# Patient Record
Sex: FEMALE | Race: WHITE | NOT HISPANIC OR LATINO
[De-identification: names, ages, dates, MRNs, and addresses within clinical notes are randomized per-mention and may not be internally consistent; named-entity substitution may affect disease eponyms.]

---

## 2017-08-03 ENCOUNTER — APPOINTMENT (OUTPATIENT)
Dept: INTERNAL MEDICINE | Facility: CLINIC | Age: 62
End: 2017-08-03
Payer: COMMERCIAL

## 2017-08-03 VITALS
WEIGHT: 159 LBS | SYSTOLIC BLOOD PRESSURE: 136 MMHG | HEART RATE: 78 BPM | OXYGEN SATURATION: 100 % | RESPIRATION RATE: 16 BRPM | TEMPERATURE: 98.7 F | BODY MASS INDEX: 23.55 KG/M2 | DIASTOLIC BLOOD PRESSURE: 80 MMHG | HEIGHT: 69 IN

## 2017-08-03 DIAGNOSIS — Z92.29 PERSONAL HISTORY OF OTHER DRUG THERAPY: ICD-10-CM

## 2017-08-03 DIAGNOSIS — Z12.11 ENCOUNTER FOR SCREENING FOR MALIGNANT NEOPLASM OF COLON: ICD-10-CM

## 2017-08-03 DIAGNOSIS — Z92.89 PERSONAL HISTORY OF OTHER MEDICAL TREATMENT: ICD-10-CM

## 2017-08-03 PROCEDURE — 99214 OFFICE O/P EST MOD 30 MIN: CPT

## 2017-08-04 ENCOUNTER — APPOINTMENT (OUTPATIENT)
Dept: ORTHOPEDIC SURGERY | Facility: CLINIC | Age: 62
End: 2017-08-04

## 2017-08-09 LAB
25(OH)D3 SERPL-MCNC: 26.2 NG/ML
ALBUMIN SERPL ELPH-MCNC: 4.7 G/DL
ALP BLD-CCNC: 131 U/L
ALT SERPL-CCNC: 106 U/L
ANION GAP SERPL CALC-SCNC: 16 MMOL/L
AST SERPL-CCNC: 66 U/L
BILIRUB SERPL-MCNC: 0.3 MG/DL
BUN SERPL-MCNC: 14 MG/DL
CALCIUM SERPL-MCNC: 9.9 MG/DL
CHLORIDE SERPL-SCNC: 103 MMOL/L
CHOLEST SERPL-MCNC: 305 MG/DL
CHOLEST/HDLC SERPL: 3.9 RATIO
CO2 SERPL-SCNC: 24 MMOL/L
CREAT SERPL-MCNC: 0.56 MG/DL
FOLATE SERPL-MCNC: 13.1 NG/ML
GLUCOSE SERPL-MCNC: 97 MG/DL
HBA1C MFR BLD HPLC: 5.6 %
HDLC SERPL-MCNC: 79 MG/DL
LDLC SERPL CALC-MCNC: 199 MG/DL
POTASSIUM SERPL-SCNC: 4.8 MMOL/L
PROT SERPL-MCNC: 7.1 G/DL
SODIUM SERPL-SCNC: 143 MMOL/L
TRIGL SERPL-MCNC: 134 MG/DL
VIT B12 SERPL-MCNC: 421 PG/ML

## 2017-10-03 ENCOUNTER — APPOINTMENT (OUTPATIENT)
Dept: CARDIOLOGY | Facility: CLINIC | Age: 62
End: 2017-10-03

## 2017-10-05 ENCOUNTER — APPOINTMENT (OUTPATIENT)
Dept: INTERNAL MEDICINE | Facility: CLINIC | Age: 62
End: 2017-10-05

## 2017-12-23 ENCOUNTER — TRANSCRIPTION ENCOUNTER (OUTPATIENT)
Age: 62
End: 2017-12-23

## 2018-02-20 ENCOUNTER — RX RENEWAL (OUTPATIENT)
Age: 63
End: 2018-02-20

## 2018-04-12 ENCOUNTER — MEDICATION RENEWAL (OUTPATIENT)
Age: 63
End: 2018-04-12

## 2018-04-20 ENCOUNTER — APPOINTMENT (OUTPATIENT)
Dept: ORTHOPEDIC SURGERY | Facility: CLINIC | Age: 63
End: 2018-04-20
Payer: COMMERCIAL

## 2018-04-20 VITALS
SYSTOLIC BLOOD PRESSURE: 158 MMHG | HEART RATE: 75 BPM | HEIGHT: 69 IN | WEIGHT: 159 LBS | BODY MASS INDEX: 23.55 KG/M2 | DIASTOLIC BLOOD PRESSURE: 94 MMHG

## 2018-04-20 PROCEDURE — 99214 OFFICE O/P EST MOD 30 MIN: CPT

## 2018-04-20 PROCEDURE — 73502 X-RAY EXAM HIP UNI 2-3 VIEWS: CPT | Mod: RT

## 2018-04-20 PROCEDURE — 72100 X-RAY EXAM L-S SPINE 2/3 VWS: CPT

## 2018-05-23 ENCOUNTER — APPOINTMENT (OUTPATIENT)
Dept: ORTHOPEDIC SURGERY | Facility: CLINIC | Age: 63
End: 2018-05-23
Payer: COMMERCIAL

## 2018-05-23 PROCEDURE — 99214 OFFICE O/P EST MOD 30 MIN: CPT

## 2018-06-05 ENCOUNTER — APPOINTMENT (OUTPATIENT)
Dept: INTERNAL MEDICINE | Facility: CLINIC | Age: 63
End: 2018-06-05
Payer: COMMERCIAL

## 2018-06-05 ENCOUNTER — NON-APPOINTMENT (OUTPATIENT)
Age: 63
End: 2018-06-05

## 2018-06-05 VITALS
SYSTOLIC BLOOD PRESSURE: 159 MMHG | BODY MASS INDEX: 25.18 KG/M2 | WEIGHT: 170 LBS | DIASTOLIC BLOOD PRESSURE: 80 MMHG | TEMPERATURE: 98.3 F | OXYGEN SATURATION: 99 % | HEIGHT: 69 IN | RESPIRATION RATE: 16 BRPM | HEART RATE: 85 BPM

## 2018-06-05 PROCEDURE — 93000 ELECTROCARDIOGRAM COMPLETE: CPT

## 2018-06-05 PROCEDURE — 99243 OFF/OP CNSLTJ NEW/EST LOW 30: CPT

## 2018-06-05 RX ORDER — METHYLPREDNISOLONE 4 MG/1
4 TABLET ORAL
Qty: 21 | Refills: 0 | Status: DISCONTINUED | COMMUNITY
Start: 2017-08-04 | End: 2018-06-05

## 2018-06-06 LAB
25(OH)D3 SERPL-MCNC: 18.5 NG/ML
ALBUMIN SERPL ELPH-MCNC: 4.6 G/DL
ALP BLD-CCNC: 186 U/L
ALT SERPL-CCNC: 96 U/L
ANION GAP SERPL CALC-SCNC: 18 MMOL/L
APPEARANCE: CLEAR
APTT BLD: 33 SEC
AST SERPL-CCNC: 48 U/L
BASOPHILS # BLD AUTO: 0.02 K/UL
BASOPHILS NFR BLD AUTO: 0.3 %
BILIRUB SERPL-MCNC: 0.4 MG/DL
BILIRUBIN URINE: NEGATIVE
BLOOD URINE: NEGATIVE
BUN SERPL-MCNC: 13 MG/DL
CALCIUM SERPL-MCNC: 9.9 MG/DL
CHLORIDE SERPL-SCNC: 102 MMOL/L
CHOLEST SERPL-MCNC: 253 MG/DL
CHOLEST/HDLC SERPL: 3.2 RATIO
CO2 SERPL-SCNC: 19 MMOL/L
COLOR: YELLOW
CREAT SERPL-MCNC: 0.62 MG/DL
EOSINOPHIL # BLD AUTO: 0.2 K/UL
EOSINOPHIL NFR BLD AUTO: 2.6 %
FOLATE SERPL-MCNC: 14.3 NG/ML
GLUCOSE QUALITATIVE U: NEGATIVE MG/DL
GLUCOSE SERPL-MCNC: 100 MG/DL
HBA1C MFR BLD HPLC: 5.8 %
HCT VFR BLD CALC: 43.8 %
HDLC SERPL-MCNC: 78 MG/DL
HGB BLD-MCNC: 14.8 G/DL
IMM GRANULOCYTES NFR BLD AUTO: 0.5 %
INR PPP: 0.89 RATIO
KETONES URINE: NEGATIVE
LDLC SERPL CALC-MCNC: 137 MG/DL
LEUKOCYTE ESTERASE URINE: NEGATIVE
LYMPHOCYTES # BLD AUTO: 1.68 K/UL
LYMPHOCYTES NFR BLD AUTO: 21.8 %
MAN DIFF?: NORMAL
MCHC RBC-ENTMCNC: 31.6 PG
MCHC RBC-ENTMCNC: 33.8 GM/DL
MCV RBC AUTO: 93.4 FL
MONOCYTES # BLD AUTO: 0.91 K/UL
MONOCYTES NFR BLD AUTO: 11.8 %
NEUTROPHILS # BLD AUTO: 4.85 K/UL
NEUTROPHILS NFR BLD AUTO: 63 %
NITRITE URINE: NEGATIVE
PH URINE: 6
PLATELET # BLD AUTO: 335 K/UL
POTASSIUM SERPL-SCNC: 4.5 MMOL/L
PROT SERPL-MCNC: 7.3 G/DL
PROTEIN URINE: NEGATIVE MG/DL
PT BLD: 10 SEC
RBC # BLD: 4.69 M/UL
RBC # FLD: 12.7 %
SODIUM SERPL-SCNC: 139 MMOL/L
SPECIFIC GRAVITY URINE: 1.01
TRIGL SERPL-MCNC: 189 MG/DL
TSH SERPL-ACNC: 3.03 UIU/ML
UROBILINOGEN URINE: NEGATIVE MG/DL
VIT B12 SERPL-MCNC: 458 PG/ML
WBC # FLD AUTO: 7.7 K/UL

## 2018-06-11 ENCOUNTER — TRANSCRIPTION ENCOUNTER (OUTPATIENT)
Age: 63
End: 2018-06-11

## 2018-06-12 ENCOUNTER — OUTPATIENT (OUTPATIENT)
Dept: OUTPATIENT SERVICES | Facility: HOSPITAL | Age: 63
LOS: 1 days | End: 2018-06-12
Payer: COMMERCIAL

## 2018-06-12 VITALS
RESPIRATION RATE: 15 BRPM | HEART RATE: 72 BPM | OXYGEN SATURATION: 100 % | DIASTOLIC BLOOD PRESSURE: 81 MMHG | SYSTOLIC BLOOD PRESSURE: 159 MMHG

## 2018-06-12 VITALS
TEMPERATURE: 98 F | SYSTOLIC BLOOD PRESSURE: 146 MMHG | HEIGHT: 69 IN | DIASTOLIC BLOOD PRESSURE: 80 MMHG | OXYGEN SATURATION: 100 % | HEART RATE: 67 BPM | WEIGHT: 169.32 LBS | RESPIRATION RATE: 12 BRPM

## 2018-06-12 DIAGNOSIS — Z96.642 PRESENCE OF LEFT ARTIFICIAL HIP JOINT: Chronic | ICD-10-CM

## 2018-06-12 DIAGNOSIS — M51.26 OTHER INTERVERTEBRAL DISC DISPLACEMENT, LUMBAR REGION: Chronic | ICD-10-CM

## 2018-06-12 DIAGNOSIS — H35.371 PUCKERING OF MACULA, RIGHT EYE: ICD-10-CM

## 2018-06-12 DIAGNOSIS — Z98.41 CATARACT EXTRACTION STATUS, RIGHT EYE: Chronic | ICD-10-CM

## 2018-06-12 DIAGNOSIS — D11.0 BENIGN NEOPLASM OF PAROTID GLAND: Chronic | ICD-10-CM

## 2018-06-12 DIAGNOSIS — R29.898 OTHER SYMPTOMS AND SIGNS INVOLVING THE MUSCULOSKELETAL SYSTEM: Chronic | ICD-10-CM

## 2018-06-12 PROCEDURE — 67042 VIT FOR MACULAR HOLE: CPT | Mod: RT

## 2018-06-12 NOTE — ASU PATIENT PROFILE, ADULT - PMH
Anxiety  hx anxiety during yrs of menstuation  Depression  hx depression during yrs of menstruation  Hyperlipidemia    Murmur    Osteoarthrosis    Rosacea

## 2018-06-12 NOTE — ASU PATIENT PROFILE, ADULT - PSH
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

## 2018-06-22 ENCOUNTER — APPOINTMENT (OUTPATIENT)
Dept: INTERNAL MEDICINE | Facility: CLINIC | Age: 63
End: 2018-06-22
Payer: COMMERCIAL

## 2018-06-22 VITALS
WEIGHT: 170 LBS | HEART RATE: 82 BPM | DIASTOLIC BLOOD PRESSURE: 82 MMHG | TEMPERATURE: 98.1 F | OXYGEN SATURATION: 98 % | SYSTOLIC BLOOD PRESSURE: 126 MMHG | BODY MASS INDEX: 25.18 KG/M2 | HEIGHT: 69 IN | RESPIRATION RATE: 17 BRPM

## 2018-06-22 DIAGNOSIS — Z01.818 ENCOUNTER FOR OTHER PREPROCEDURAL EXAMINATION: ICD-10-CM

## 2018-06-22 DIAGNOSIS — M54.16 RADICULOPATHY, LUMBAR REGION: ICD-10-CM

## 2018-06-22 PROCEDURE — 99396 PREV VISIT EST AGE 40-64: CPT

## 2018-07-16 ENCOUNTER — RX RENEWAL (OUTPATIENT)
Age: 63
End: 2018-07-16

## 2018-07-27 ENCOUNTER — APPOINTMENT (OUTPATIENT)
Dept: ORTHOPEDIC SURGERY | Facility: CLINIC | Age: 63
End: 2018-07-27

## 2018-10-26 ENCOUNTER — APPOINTMENT (OUTPATIENT)
Dept: ORTHOPEDIC SURGERY | Facility: CLINIC | Age: 63
End: 2018-10-26
Payer: COMMERCIAL

## 2018-10-26 ENCOUNTER — MEDICATION RENEWAL (OUTPATIENT)
Age: 63
End: 2018-10-26

## 2018-10-26 VITALS
DIASTOLIC BLOOD PRESSURE: 96 MMHG | HEIGHT: 69 IN | BODY MASS INDEX: 25.18 KG/M2 | HEART RATE: 86 BPM | SYSTOLIC BLOOD PRESSURE: 180 MMHG | WEIGHT: 170 LBS

## 2018-10-26 PROCEDURE — 73502 X-RAY EXAM HIP UNI 2-3 VIEWS: CPT | Mod: RT

## 2018-10-26 PROCEDURE — 99214 OFFICE O/P EST MOD 30 MIN: CPT

## 2018-10-26 PROCEDURE — 72100 X-RAY EXAM L-S SPINE 2/3 VWS: CPT

## 2018-10-29 ENCOUNTER — APPOINTMENT (OUTPATIENT)
Dept: INTERNAL MEDICINE | Facility: CLINIC | Age: 63
End: 2018-10-29
Payer: COMMERCIAL

## 2018-10-29 VITALS
HEIGHT: 69 IN | OXYGEN SATURATION: 97 % | WEIGHT: 293 LBS | TEMPERATURE: 98.1 F | RESPIRATION RATE: 17 BRPM | BODY MASS INDEX: 43.4 KG/M2 | HEART RATE: 94 BPM | SYSTOLIC BLOOD PRESSURE: 160 MMHG | DIASTOLIC BLOOD PRESSURE: 90 MMHG

## 2018-10-29 DIAGNOSIS — M54.16 RADICULOPATHY, LUMBAR REGION: ICD-10-CM

## 2018-10-29 DIAGNOSIS — Z63.4 DISAPPEARANCE AND DEATH OF FAMILY MEMBER: ICD-10-CM

## 2018-10-29 PROCEDURE — 99215 OFFICE O/P EST HI 40 MIN: CPT | Mod: 25

## 2018-10-29 PROCEDURE — 90686 IIV4 VACC NO PRSV 0.5 ML IM: CPT

## 2018-10-29 PROCEDURE — 93000 ELECTROCARDIOGRAM COMPLETE: CPT

## 2018-10-29 PROCEDURE — 90471 IMMUNIZATION ADMIN: CPT

## 2018-10-29 RX ORDER — DICLOFENAC SODIUM 75 MG/1
75 TABLET, DELAYED RELEASE ORAL
Qty: 60 | Refills: 1 | Status: DISCONTINUED | COMMUNITY
Start: 2018-04-20 | End: 2018-10-29

## 2018-10-29 RX ORDER — PREDNISOLONE ACETATE 10 MG/ML
1 SUSPENSION/ DROPS OPHTHALMIC
Qty: 5 | Refills: 0 | Status: DISCONTINUED | COMMUNITY
Start: 2018-06-13 | End: 2018-10-29

## 2018-10-29 RX ORDER — ATORVASTATIN CALCIUM 80 MG/1
80 TABLET, FILM COATED ORAL
Qty: 1 | Refills: 0 | Status: DISCONTINUED | COMMUNITY
Start: 2017-08-16 | End: 2018-10-29

## 2018-10-29 RX ORDER — CIPROFLOXACIN 3 MG/ML
0.3 SOLUTION OPHTHALMIC
Qty: 5 | Refills: 0 | Status: DISCONTINUED | COMMUNITY
Start: 2018-06-13 | End: 2018-10-29

## 2018-10-29 SDOH — SOCIAL STABILITY - SOCIAL INSECURITY: DISSAPEARANCE AND DEATH OF FAMILY MEMBER: Z63.4

## 2018-10-30 LAB
25(OH)D3 SERPL-MCNC: 17.4 NG/ML
ALBUMIN SERPL ELPH-MCNC: 4.9 G/DL
ALP BLD-CCNC: 163 U/L
ALT SERPL-CCNC: 104 U/L
ANION GAP SERPL CALC-SCNC: 14 MMOL/L
AST SERPL-CCNC: 63 U/L
BILIRUB SERPL-MCNC: 0.3 MG/DL
BUN SERPL-MCNC: 17 MG/DL
CALCIUM SERPL-MCNC: 10.2 MG/DL
CHLORIDE SERPL-SCNC: 103 MMOL/L
CHOLEST SERPL-MCNC: 275 MG/DL
CHOLEST/HDLC SERPL: 3.5 RATIO
CO2 SERPL-SCNC: 22 MMOL/L
CREAT SERPL-MCNC: 0.58 MG/DL
GLUCOSE SERPL-MCNC: 96 MG/DL
HBA1C MFR BLD HPLC: 5.8 %
HDLC SERPL-MCNC: 78 MG/DL
LDLC SERPL CALC-MCNC: 136 MG/DL
POTASSIUM SERPL-SCNC: 4.8 MMOL/L
PROT SERPL-MCNC: 7.5 G/DL
SODIUM SERPL-SCNC: 139 MMOL/L
TRIGL SERPL-MCNC: 306 MG/DL

## 2018-10-31 ENCOUNTER — MEDICATION RENEWAL (OUTPATIENT)
Age: 63
End: 2018-10-31

## 2018-11-02 ENCOUNTER — MEDICATION RENEWAL (OUTPATIENT)
Age: 63
End: 2018-11-02

## 2019-01-29 ENCOUNTER — RX RENEWAL (OUTPATIENT)
Age: 64
End: 2019-01-29

## 2019-06-14 ENCOUNTER — APPOINTMENT (OUTPATIENT)
Dept: ORTHOPEDIC SURGERY | Facility: CLINIC | Age: 64
End: 2019-06-14
Payer: MEDICAID

## 2019-06-14 VITALS
SYSTOLIC BLOOD PRESSURE: 149 MMHG | HEIGHT: 69 IN | DIASTOLIC BLOOD PRESSURE: 81 MMHG | WEIGHT: 164 LBS | BODY MASS INDEX: 24.29 KG/M2 | HEART RATE: 78 BPM

## 2019-06-14 PROCEDURE — 73502 X-RAY EXAM HIP UNI 2-3 VIEWS: CPT | Mod: RT

## 2019-06-14 PROCEDURE — 99214 OFFICE O/P EST MOD 30 MIN: CPT

## 2019-06-14 NOTE — HISTORY OF PRESENT ILLNESS
[Worsening] : worsening [9] : a maximum pain level of 9/10 [8] : a current pain level of 8/10 [Hip Movement] : worsened by hip movement [Standing] : standing [Constant] : ~He/She~ states the symptoms seem to be constant [Walking] : worsened by walking [Rest] : relieved by rest [de-identified] : Patient is a 62 year old female, status post left total hip in 2014, presenting with continued right hip and lower back pain. The left hip is doing great. The pain of the right hip is localized to the lateral aspect and sometimes the groin. She notes since she was seen last in October 2018, she has had increasingly severe pain of the right hip. She underwent intraarticular injection, with only relief for a couple weeks. The pain is worsened by going down the stairs.  She admits she has had some numbness to the whole right leg as well as tingling. She is being treated for her lower back under pain management, and is taking gabapentin. \par She is here to consider total hip replacement at this time on the right side, as her pain is now interfering with her quality of life, and she has failed all conservative management including NSAIDS (ie diclofenac, aleve, advil), intraarticular injection, and physical therapy.

## 2019-06-14 NOTE — CONSULT LETTER
[Dear  ___] : Dear  [unfilled], [Consult Letter:] : I had the pleasure of evaluating your patient, [unfilled]. [Please see my note below.] : Please see my note below. [Sincerely,] : Sincerely, [FreeTextEntry2] : GUADALUPE BENA\par  [FreeTextEntry3] : Jourdan Woods MD\par \par ________________________________________________\par [facility] : Hip/Knee Arthroplasty\par [address], [place] [address]\par (T) [phone]\par (F) [phone]

## 2019-06-14 NOTE — PHYSICAL EXAM
[de-identified] : On general examination the patient is adequately groomed and nourished. The vital parameters are as recorded. \par There is no lymphedema or diffuse swelling, no varicose veins, no skin warmth/erythema/scars/swelling, no ulcers and no palpable lymph nodes or masses in both lower extremities. Bilateral pedal pulses are well palpable.\par Upper Extremity:\par Both right and left upper extremities are unremarkable in terms of skin rash, lesions, pigmentation, redness, tenderness, swelling, joint instability, abnormal deformity or crepitus. The overall range of motion, sensation, motor tone and strength testing are normal.\par \par Hip Exam:\par The gait is right stiff hip coxalgic.\par The patient has equal leg lengths \par /Dg test is 8 inches on the right and 6 inches on the left. \par Active SLR is 40 degrees on the right and 60 degrees on the left. Right hip demonstrates no scars and the skin has no signs of inflammation or tenderness. Left hip reveals well healed scar from previous THR. \par Both Hips have a range of motion that is symmetrical in flexion and extension of:\par Hip flexion:             Right 80 degrees                Left 100 degrees\par Hip abduction:      Right 30 degrees                  Left 40 degrees\par Hip adduction:      Right 15 degrees                    Left 20 degrees\par Internal rotation:      Right 15 degrees                   Left 20 degrees\par External rotation:    Right 25 degrees                  Left 40 degrees\par There is severe pain with range of motion of the right hip. \par There is no flexion contracture, deformity or instability. \par Labral impingement tests are negative.\par Right hip flexor, abductor and extensor power is grade 4+.\par Left hip flexor, abductor and extensor power is grade 5.\par \par Spine Exam:\par There is mild curvature of the spine with loss of normal lumbar lordosis. The skin is devoid of erythema, scars, pigmentation or rashes. There is mild lumbar spasm and tenderness especially at L4-L5 or L5-S1. \par The range of motion of the lumbar spine is limited by pain and spasm. Forward flexion is 80% normal, extension catch positive, lateral flexion and rotation 80% normal. There is no lumbar spine imbalance or instability detected.\par Bilateral passive SLR is right 40 degrees, left 40 degrees in supine and sitting positions. Lasegue's Test is negative.\par Neurology:\par The patient is alert and oriented in person, place and time. The mood is calm and affect is normal.\par Testing for coordination including Rhomberg's Test and Finger-Nose Test, sensation, motor tone and power and deep tendon reflexes in both lower extremities is normal.\par  [de-identified] : The following radiographs were ordered and read by me during this patients visit. I reviewed each radiograph in detail with the patient and discussed the findings as highlighted below. \par AP and false profile views of the right hip and pelvis confirm advanced degenerative joint disease with joint space narrowing osteophyte and cyst formation. there is progression from the previous imaging. \par \par

## 2019-06-14 NOTE — DISCUSSION/SUMMARY
[de-identified] : Right hip DJD, s/p left total hip replacement, lumbar spine DJD. \par The natural history and treatment of degenerative arthritis was discussed with the patient at length today. The spectrum of treatment including nonoperative modalities to surgical intervention was elucidated. Noninvasive and nonoperative treatment modalities include weight reduction, activity modification with low impact exercise,  as needed use of acetaminophen or anti-inflammatory medications if tolerated, glucosamine/chondroitin supplements, and physical therapy. Further treatments can include corticosteroid injection and the use of viscosupplementation with hyaluronic acid injections. Definitive surgical treatment can certainly include total joint arthroplasty also. The risks and benefits of each treatment options was discussed and all questions were answered.\par In view of lack of adequate pain relief with conservative (non-surgical) management protocol including physical therapy, home exercises, weight loss, activity modification, NSAIDS; the patient is recommended to consider a right Total Hip Replacement. \par The risks, benefits, alternatives, implications, complications including but not limited to pain, stiffness, bleeding, limp, wound breakdown, infection, limb length discrepancy, dislocation, bone fracture, nerve and vascular compromise, implant wear and durability issues and rehabilitation were discussed and relevant questions were addressed. The possibility of recurrent pain, no improvement in pain and actual worsening of the pain were also mentioned in conversation with the patient. Medical complications related to the patient's general medical health including deep vein thrombosis, pulmonary embolus, heart attack, stroke, death and other complications from anesthesia were discussed as well. The patient wishes to proceed and will undergo preoperative medical evaluation and clearance, attend the preoperative educational class and will schedule surgery appropriately.\par Anticoagulation prophylaxis medication options to address risks of deep vein thrombosis and pulmonary embolism were discussed and weighed against the risks of bleeding and wound healing complications. The patient elected aspirin/coumadin prophylaxis with mechanical modalities.\par She has been indicated for outpatient total hip replacement.

## 2019-06-18 ENCOUNTER — APPOINTMENT (OUTPATIENT)
Dept: INTERNAL MEDICINE | Facility: CLINIC | Age: 64
End: 2019-06-18
Payer: MEDICAID

## 2019-06-18 VITALS
BODY MASS INDEX: 25.48 KG/M2 | OXYGEN SATURATION: 98 % | TEMPERATURE: 98.6 F | HEART RATE: 81 BPM | RESPIRATION RATE: 17 BRPM | WEIGHT: 172 LBS | DIASTOLIC BLOOD PRESSURE: 84 MMHG | SYSTOLIC BLOOD PRESSURE: 132 MMHG | HEIGHT: 69 IN

## 2019-06-18 DIAGNOSIS — Z86.79 PERSONAL HISTORY OF OTHER DISEASES OF THE CIRCULATORY SYSTEM: ICD-10-CM

## 2019-06-18 DIAGNOSIS — Z92.29 PERSONAL HISTORY OF OTHER DRUG THERAPY: ICD-10-CM

## 2019-06-18 DIAGNOSIS — H35.371 PUCKERING OF MACULA, RIGHT EYE: ICD-10-CM

## 2019-06-18 PROCEDURE — 99215 OFFICE O/P EST HI 40 MIN: CPT | Mod: 25

## 2019-06-18 PROCEDURE — 99406 BEHAV CHNG SMOKING 3-10 MIN: CPT

## 2019-06-18 RX ORDER — DICLOFENAC SODIUM 75 MG/1
75 TABLET, DELAYED RELEASE ORAL
Qty: 1 | Refills: 1 | Status: DISCONTINUED | COMMUNITY
Start: 2018-10-26 | End: 2019-06-18

## 2019-06-19 ENCOUNTER — OUTPATIENT (OUTPATIENT)
Dept: OUTPATIENT SERVICES | Facility: HOSPITAL | Age: 64
LOS: 1 days | End: 2019-06-19
Payer: MEDICAID

## 2019-06-19 VITALS
HEART RATE: 75 BPM | RESPIRATION RATE: 16 BRPM | TEMPERATURE: 98 F | SYSTOLIC BLOOD PRESSURE: 145 MMHG | WEIGHT: 164.02 LBS | OXYGEN SATURATION: 98 % | DIASTOLIC BLOOD PRESSURE: 80 MMHG | HEIGHT: 69 IN

## 2019-06-19 DIAGNOSIS — R29.898 OTHER SYMPTOMS AND SIGNS INVOLVING THE MUSCULOSKELETAL SYSTEM: Chronic | ICD-10-CM

## 2019-06-19 DIAGNOSIS — D11.0 BENIGN NEOPLASM OF PAROTID GLAND: Chronic | ICD-10-CM

## 2019-06-19 DIAGNOSIS — M16.12 UNILATERAL PRIMARY OSTEOARTHRITIS, LEFT HIP: ICD-10-CM

## 2019-06-19 DIAGNOSIS — M16.11 UNILATERAL PRIMARY OSTEOARTHRITIS, RIGHT HIP: ICD-10-CM

## 2019-06-19 DIAGNOSIS — M51.26 OTHER INTERVERTEBRAL DISC DISPLACEMENT, LUMBAR REGION: Chronic | ICD-10-CM

## 2019-06-19 DIAGNOSIS — Z98.41 CATARACT EXTRACTION STATUS, RIGHT EYE: Chronic | ICD-10-CM

## 2019-06-19 DIAGNOSIS — Z96.642 PRESENCE OF LEFT ARTIFICIAL HIP JOINT: Chronic | ICD-10-CM

## 2019-06-19 DIAGNOSIS — Z01.818 ENCOUNTER FOR OTHER PREPROCEDURAL EXAMINATION: ICD-10-CM

## 2019-06-19 LAB
ANION GAP SERPL CALC-SCNC: 18 MMOL/L — HIGH (ref 5–17)
BLD GP AB SCN SERPL QL: NEGATIVE — SIGNIFICANT CHANGE UP
BUN SERPL-MCNC: 11 MG/DL — SIGNIFICANT CHANGE UP (ref 7–23)
CALCIUM SERPL-MCNC: 10.4 MG/DL — SIGNIFICANT CHANGE UP (ref 8.4–10.5)
CHLORIDE SERPL-SCNC: 101 MMOL/L — SIGNIFICANT CHANGE UP (ref 96–108)
CO2 SERPL-SCNC: 21 MMOL/L — LOW (ref 22–31)
CREAT SERPL-MCNC: 0.52 MG/DL — SIGNIFICANT CHANGE UP (ref 0.5–1.3)
GLUCOSE SERPL-MCNC: 104 MG/DL — HIGH (ref 70–99)
POTASSIUM SERPL-MCNC: 3.7 MMOL/L — SIGNIFICANT CHANGE UP (ref 3.5–5.3)
POTASSIUM SERPL-SCNC: 3.7 MMOL/L — SIGNIFICANT CHANGE UP (ref 3.5–5.3)
RH IG SCN BLD-IMP: POSITIVE — SIGNIFICANT CHANGE UP
SODIUM SERPL-SCNC: 140 MMOL/L — SIGNIFICANT CHANGE UP (ref 135–145)

## 2019-06-19 PROCEDURE — G0463: CPT

## 2019-06-19 PROCEDURE — 85027 COMPLETE CBC AUTOMATED: CPT

## 2019-06-19 PROCEDURE — 86850 RBC ANTIBODY SCREEN: CPT

## 2019-06-19 PROCEDURE — 80048 BASIC METABOLIC PNL TOTAL CA: CPT

## 2019-06-19 PROCEDURE — 86901 BLOOD TYPING SEROLOGIC RH(D): CPT

## 2019-06-19 PROCEDURE — 87640 STAPH A DNA AMP PROBE: CPT

## 2019-06-19 PROCEDURE — 87641 MR-STAPH DNA AMP PROBE: CPT

## 2019-06-19 PROCEDURE — 86900 BLOOD TYPING SEROLOGIC ABO: CPT

## 2019-06-19 PROCEDURE — 83036 HEMOGLOBIN GLYCOSYLATED A1C: CPT

## 2019-06-19 NOTE — H&P PST ADULT - NSICDXPASTSURGICALHX_GEN_ALL_CORE_FT
PAST SURGICAL HISTORY:  Benign neoplasm parotid gland tumor removedl  left side 1999    Hand disorder arthritis    Herniated lumbar intervertebral disc L5 shaved 2004    History of bilateral cataract extraction     History of total hip replacement, left 9/9/2014

## 2019-06-19 NOTE — H&P PST ADULT - HISTORY OF PRESENT ILLNESS
64 year old female with hx of hyperlipidemia, smoker .5 PPD x 30 years ans osteoarthritis with previous hip replacement 2014 left . Patient has worsening hip  pain recently , re-evaluated by Dr Woods , sent for Xray and was positive for osteoarthritics needed surgery for treatment.

## 2019-06-19 NOTE — H&P PST ADULT - NSICDXPASTMEDICALHX_GEN_ALL_CORE_FT
PAST MEDICAL HISTORY:  Anxiety hx anxiety during yrs of menstuation    Depression hx depression during yrs of menstruation/ stable now    Hyperlipidemia on meds    Murmur benign    Osteoarthrosis hip replacement left 2014    Rosacea

## 2019-06-19 NOTE — H&P PST ADULT - NSICDXPROBLEM_GEN_ALL_CORE_FT
PROBLEM DIAGNOSES  Problem: Unilateral primary osteoarthritis, left hip  Assessment and Plan: Right total hip replacement, direct anterior, PSt instruction given with 3 days antibacterial soap for prevention of post op infection, CBC,BMP, Type ans screen , hgbA1c  and nasal swab  sent  pending result . Patient seen by PMD for preop medical evaluation .Fingerstick for glucose level preop

## 2019-06-20 ENCOUNTER — NON-APPOINTMENT (OUTPATIENT)
Age: 64
End: 2019-06-20

## 2019-06-20 ENCOUNTER — OTHER (OUTPATIENT)
Age: 64
End: 2019-06-20

## 2019-06-20 LAB
HBA1C BLD-MCNC: 5.9 % — HIGH (ref 4–5.6)
HCT VFR BLD CALC: 45.2 % — HIGH (ref 34.5–45)
HGB BLD-MCNC: 15.5 G/DL — SIGNIFICANT CHANGE UP (ref 11.5–15.5)
MCHC RBC-ENTMCNC: 32 PG — SIGNIFICANT CHANGE UP (ref 27–34)
MCHC RBC-ENTMCNC: 34.3 GM/DL — SIGNIFICANT CHANGE UP (ref 32–36)
MCV RBC AUTO: 93.2 FL — SIGNIFICANT CHANGE UP (ref 80–100)
MRSA PCR RESULT.: SIGNIFICANT CHANGE UP
PLATELET # BLD AUTO: 348 K/UL — SIGNIFICANT CHANGE UP (ref 150–400)
RBC # BLD: 4.85 M/UL — SIGNIFICANT CHANGE UP (ref 3.8–5.2)
RBC # FLD: 11.8 % — SIGNIFICANT CHANGE UP (ref 10.3–14.5)
S AUREUS DNA NOSE QL NAA+PROBE: DETECTED
WBC # BLD: 7.48 K/UL — SIGNIFICANT CHANGE UP (ref 3.8–10.5)
WBC # FLD AUTO: 7.48 K/UL — SIGNIFICANT CHANGE UP (ref 3.8–10.5)

## 2019-06-26 ENCOUNTER — APPOINTMENT (OUTPATIENT)
Dept: ORTHOPEDIC SURGERY | Facility: HOSPITAL | Age: 64
End: 2019-06-26

## 2019-07-01 ENCOUNTER — APPOINTMENT (OUTPATIENT)
Dept: ORTHOPEDIC SURGERY | Facility: HOSPITAL | Age: 64
End: 2019-07-01

## 2019-07-01 ENCOUNTER — TRANSCRIPTION ENCOUNTER (OUTPATIENT)
Age: 64
End: 2019-07-01

## 2019-07-02 ENCOUNTER — APPOINTMENT (OUTPATIENT)
Dept: INTERNAL MEDICINE | Facility: CLINIC | Age: 64
End: 2019-07-02

## 2019-07-22 ENCOUNTER — RX RENEWAL (OUTPATIENT)
Age: 64
End: 2019-07-22

## 2019-08-12 ENCOUNTER — APPOINTMENT (OUTPATIENT)
Dept: INTERNAL MEDICINE | Facility: CLINIC | Age: 64
End: 2019-08-12
Payer: MEDICAID

## 2019-08-12 VITALS
DIASTOLIC BLOOD PRESSURE: 80 MMHG | WEIGHT: 172 LBS | HEIGHT: 69 IN | HEART RATE: 82 BPM | SYSTOLIC BLOOD PRESSURE: 140 MMHG | BODY MASS INDEX: 25.48 KG/M2 | OXYGEN SATURATION: 97 %

## 2019-08-12 DIAGNOSIS — M18.0 BILATERAL PRIMARY OSTEOARTHRITIS OF FIRST CARPOMETACARPAL JOINTS: ICD-10-CM

## 2019-08-12 PROCEDURE — G0296 VISIT TO DETERM LDCT ELIG: CPT | Mod: 59

## 2019-08-12 PROCEDURE — 99406 BEHAV CHNG SMOKING 3-10 MIN: CPT

## 2019-08-12 PROCEDURE — 99396 PREV VISIT EST AGE 40-64: CPT

## 2019-08-12 RX ORDER — OXYCODONE 5 MG/1
5 TABLET ORAL
Qty: 84 | Refills: 0 | Status: DISCONTINUED | COMMUNITY
Start: 2019-06-24 | End: 2019-08-12

## 2019-09-18 ENCOUNTER — RX RENEWAL (OUTPATIENT)
Age: 64
End: 2019-09-18

## 2019-09-20 ENCOUNTER — TRANSCRIPTION ENCOUNTER (OUTPATIENT)
Age: 64
End: 2019-09-20

## 2019-09-30 ENCOUNTER — OUTPATIENT (OUTPATIENT)
Dept: OUTPATIENT SERVICES | Facility: HOSPITAL | Age: 64
LOS: 1 days | End: 2019-09-30
Payer: MEDICAID

## 2019-09-30 VITALS
RESPIRATION RATE: 20 BRPM | OXYGEN SATURATION: 98 % | HEIGHT: 68.5 IN | TEMPERATURE: 98 F | DIASTOLIC BLOOD PRESSURE: 78 MMHG | SYSTOLIC BLOOD PRESSURE: 138 MMHG | WEIGHT: 172.4 LBS | HEART RATE: 70 BPM

## 2019-09-30 DIAGNOSIS — Z96.642 PRESENCE OF LEFT ARTIFICIAL HIP JOINT: Chronic | ICD-10-CM

## 2019-09-30 DIAGNOSIS — Z01.818 ENCOUNTER FOR OTHER PREPROCEDURAL EXAMINATION: ICD-10-CM

## 2019-09-30 DIAGNOSIS — R29.898 OTHER SYMPTOMS AND SIGNS INVOLVING THE MUSCULOSKELETAL SYSTEM: Chronic | ICD-10-CM

## 2019-09-30 DIAGNOSIS — Z98.41 CATARACT EXTRACTION STATUS, RIGHT EYE: Chronic | ICD-10-CM

## 2019-09-30 DIAGNOSIS — D11.0 BENIGN NEOPLASM OF PAROTID GLAND: Chronic | ICD-10-CM

## 2019-09-30 DIAGNOSIS — M51.26 OTHER INTERVERTEBRAL DISC DISPLACEMENT, LUMBAR REGION: Chronic | ICD-10-CM

## 2019-09-30 DIAGNOSIS — Z98.890 OTHER SPECIFIED POSTPROCEDURAL STATES: Chronic | ICD-10-CM

## 2019-09-30 DIAGNOSIS — M16.11 UNILATERAL PRIMARY OSTEOARTHRITIS, RIGHT HIP: ICD-10-CM

## 2019-09-30 DIAGNOSIS — M19.90 UNSPECIFIED OSTEOARTHRITIS, UNSPECIFIED SITE: ICD-10-CM

## 2019-09-30 LAB
ANION GAP SERPL CALC-SCNC: 11 MMOL/L — SIGNIFICANT CHANGE UP (ref 5–17)
BLD GP AB SCN SERPL QL: NEGATIVE — SIGNIFICANT CHANGE UP
BUN SERPL-MCNC: 11 MG/DL — SIGNIFICANT CHANGE UP (ref 7–23)
CALCIUM SERPL-MCNC: 10 MG/DL — SIGNIFICANT CHANGE UP (ref 8.4–10.5)
CHLORIDE SERPL-SCNC: 102 MMOL/L — SIGNIFICANT CHANGE UP (ref 96–108)
CO2 SERPL-SCNC: 22 MMOL/L — SIGNIFICANT CHANGE UP (ref 22–31)
CREAT SERPL-MCNC: 0.52 MG/DL — SIGNIFICANT CHANGE UP (ref 0.5–1.3)
GLUCOSE SERPL-MCNC: 129 MG/DL — HIGH (ref 70–99)
HBA1C BLD-MCNC: 5.8 % — HIGH (ref 4–5.6)
HCT VFR BLD CALC: 43 % — SIGNIFICANT CHANGE UP (ref 34.5–45)
HGB BLD-MCNC: 14.9 G/DL — SIGNIFICANT CHANGE UP (ref 11.5–15.5)
MCHC RBC-ENTMCNC: 32.1 PG — SIGNIFICANT CHANGE UP (ref 27–34)
MCHC RBC-ENTMCNC: 34.7 GM/DL — SIGNIFICANT CHANGE UP (ref 32–36)
MCV RBC AUTO: 92.7 FL — SIGNIFICANT CHANGE UP (ref 80–100)
MRSA PCR RESULT.: SIGNIFICANT CHANGE UP
PLATELET # BLD AUTO: 307 K/UL — SIGNIFICANT CHANGE UP (ref 150–400)
POTASSIUM SERPL-MCNC: 4 MMOL/L — SIGNIFICANT CHANGE UP (ref 3.5–5.3)
POTASSIUM SERPL-SCNC: 4 MMOL/L — SIGNIFICANT CHANGE UP (ref 3.5–5.3)
RBC # BLD: 4.64 M/UL — SIGNIFICANT CHANGE UP (ref 3.8–5.2)
RBC # FLD: 11.9 % — SIGNIFICANT CHANGE UP (ref 10.3–14.5)
RH IG SCN BLD-IMP: POSITIVE — SIGNIFICANT CHANGE UP
S AUREUS DNA NOSE QL NAA+PROBE: SIGNIFICANT CHANGE UP
SODIUM SERPL-SCNC: 135 MMOL/L — SIGNIFICANT CHANGE UP (ref 135–145)
WBC # BLD: 5.7 K/UL — SIGNIFICANT CHANGE UP (ref 3.8–10.5)
WBC # FLD AUTO: 5.7 K/UL — SIGNIFICANT CHANGE UP (ref 3.8–10.5)

## 2019-09-30 PROCEDURE — G0463: CPT

## 2019-09-30 PROCEDURE — 86901 BLOOD TYPING SEROLOGIC RH(D): CPT

## 2019-09-30 PROCEDURE — 87640 STAPH A DNA AMP PROBE: CPT

## 2019-09-30 PROCEDURE — 86900 BLOOD TYPING SEROLOGIC ABO: CPT

## 2019-09-30 PROCEDURE — 80048 BASIC METABOLIC PNL TOTAL CA: CPT

## 2019-09-30 PROCEDURE — 83036 HEMOGLOBIN GLYCOSYLATED A1C: CPT

## 2019-09-30 PROCEDURE — 86850 RBC ANTIBODY SCREEN: CPT

## 2019-09-30 PROCEDURE — 85027 COMPLETE CBC AUTOMATED: CPT

## 2019-09-30 RX ORDER — SODIUM CHLORIDE 9 MG/ML
3 INJECTION INTRAMUSCULAR; INTRAVENOUS; SUBCUTANEOUS EVERY 8 HOURS
Refills: 0 | Status: DISCONTINUED | OUTPATIENT
Start: 2019-10-09 | End: 2019-10-25

## 2019-09-30 NOTE — H&P PST ADULT - NSICDXPROBLEM_GEN_ALL_CORE_FT
PROBLEM DIAGNOSES  Problem: Osteoarthritis  Assessment and Plan: Right total anterior direct total hip replacement on 10/9/19.

## 2019-09-30 NOTE — H&P PST ADULT - RS GEN PE MLT RESP DETAILS PC
normal/airway patent/respirations non-labored/breath sounds equal/good air movement/clear to auscultation bilaterally

## 2019-09-30 NOTE — H&P PST ADULT - NSICDXPASTMEDICALHX_GEN_ALL_CORE_FT
PAST MEDICAL HISTORY:  Anxiety and depression hx depression during yrs of menstruation/ stable now    Hyperlipidemia on meds    Murmur benign    Osteoarthrosis hip replacement left 2014    Rosacea

## 2019-09-30 NOTE — H&P PST ADULT - HISTORY OF PRESENT ILLNESS
64 year old female with hx of hyperlipidemia, smoker .5 PPD x 30 years ans osteoarthritis with previous hip replacement 2014 left . Patient has worsening hip  pain recently , re-evaluated by Dr Woods , sent for Xray and was positive for osteoarthritics needed surgery for treatment. 64 year old female with hx of hyperlipidemia, smoker .5 PPD x 30 years ans osteoarthritis with previous hip replacement 2014 left . Patient has worsening right  hip  pain recently , Now coming in for Right total anterior direct total hip replacement on 10/9/19.

## 2019-09-30 NOTE — H&P PST ADULT - NSICDXPASTSURGICALHX_GEN_ALL_CORE_FT
PAST SURGICAL HISTORY:  Benign neoplasm parotid gland tumor removed  left side 1999    History of bilateral cataract extraction     History of hand surgery basal anthroplasty- left    History of total hip replacement, left 9/9/2014    S/P laminectomy microdiscectomy L5 2004

## 2019-10-01 ENCOUNTER — LABORATORY RESULT (OUTPATIENT)
Age: 64
End: 2019-10-01

## 2019-10-01 ENCOUNTER — APPOINTMENT (OUTPATIENT)
Dept: INTERNAL MEDICINE | Facility: CLINIC | Age: 64
End: 2019-10-01
Payer: MEDICAID

## 2019-10-01 VITALS
TEMPERATURE: 98 F | OXYGEN SATURATION: 98 % | BODY MASS INDEX: 26.22 KG/M2 | DIASTOLIC BLOOD PRESSURE: 80 MMHG | HEART RATE: 70 BPM | WEIGHT: 177 LBS | SYSTOLIC BLOOD PRESSURE: 133 MMHG | HEIGHT: 69 IN

## 2019-10-01 PROCEDURE — 99215 OFFICE O/P EST HI 40 MIN: CPT | Mod: 25

## 2019-10-01 PROCEDURE — 36415 COLL VENOUS BLD VENIPUNCTURE: CPT

## 2019-10-01 RX ORDER — ATORVASTATIN CALCIUM 40 MG/1
40 TABLET, FILM COATED ORAL DAILY
Qty: 45 | Refills: 3 | Status: DISCONTINUED | COMMUNITY
Start: 2019-01-29 | End: 2019-10-01

## 2019-10-01 RX ORDER — MUPIROCIN 20 MG/G
2 OINTMENT TOPICAL
Qty: 1 | Refills: 0 | Status: DISCONTINUED | COMMUNITY
Start: 2019-06-20 | End: 2019-10-01

## 2019-10-01 RX ORDER — ACETAMINOPHEN 500 MG/1
500 TABLET, COATED ORAL 3 TIMES DAILY
Qty: 90 | Refills: 0 | Status: DISCONTINUED | COMMUNITY
Start: 2019-06-24 | End: 2019-10-01

## 2019-10-01 RX ORDER — CEPHALEXIN 500 MG/1
500 CAPSULE ORAL 3 TIMES DAILY
Qty: 3 | Refills: 0 | Status: DISCONTINUED | COMMUNITY
Start: 2019-06-24 | End: 2019-10-01

## 2019-10-07 ENCOUNTER — LABORATORY RESULT (OUTPATIENT)
Age: 64
End: 2019-10-07

## 2019-10-07 ENCOUNTER — APPOINTMENT (OUTPATIENT)
Dept: SURGERY | Facility: CLINIC | Age: 64
End: 2019-10-07
Payer: MEDICAID

## 2019-10-07 DIAGNOSIS — E07.89 OTHER SPECIFIED DISORDERS OF THYROID: ICD-10-CM

## 2019-10-07 PROCEDURE — 10005 FNA BX W/US GDN 1ST LES: CPT

## 2019-10-07 PROCEDURE — 99204 OFFICE O/P NEW MOD 45 MIN: CPT | Mod: 25

## 2019-10-07 NOTE — PHYSICAL EXAM
[Midline] : located in midline position [Normal] : orientation to person, place, and time: normal [de-identified] : voice clear

## 2019-10-08 ENCOUNTER — TRANSCRIPTION ENCOUNTER (OUTPATIENT)
Age: 64
End: 2019-10-08

## 2019-10-09 ENCOUNTER — RESULT REVIEW (OUTPATIENT)
Age: 64
End: 2019-10-09

## 2019-10-09 ENCOUNTER — OUTPATIENT (OUTPATIENT)
Dept: OUTPATIENT SERVICES | Facility: HOSPITAL | Age: 64
LOS: 1 days | End: 2019-10-09
Payer: MEDICAID

## 2019-10-09 ENCOUNTER — TRANSCRIPTION ENCOUNTER (OUTPATIENT)
Age: 64
End: 2019-10-09

## 2019-10-09 ENCOUNTER — APPOINTMENT (OUTPATIENT)
Dept: ORTHOPEDIC SURGERY | Facility: HOSPITAL | Age: 64
End: 2019-10-09

## 2019-10-09 VITALS
WEIGHT: 172.4 LBS | SYSTOLIC BLOOD PRESSURE: 123 MMHG | RESPIRATION RATE: 16 BRPM | TEMPERATURE: 98 F | OXYGEN SATURATION: 98 % | HEIGHT: 68 IN | HEART RATE: 60 BPM | DIASTOLIC BLOOD PRESSURE: 75 MMHG

## 2019-10-09 VITALS
DIASTOLIC BLOOD PRESSURE: 73 MMHG | RESPIRATION RATE: 20 BRPM | OXYGEN SATURATION: 97 % | SYSTOLIC BLOOD PRESSURE: 130 MMHG | HEART RATE: 64 BPM

## 2019-10-09 DIAGNOSIS — D11.0 BENIGN NEOPLASM OF PAROTID GLAND: Chronic | ICD-10-CM

## 2019-10-09 DIAGNOSIS — M16.11 UNILATERAL PRIMARY OSTEOARTHRITIS, RIGHT HIP: ICD-10-CM

## 2019-10-09 DIAGNOSIS — Z98.41 CATARACT EXTRACTION STATUS, RIGHT EYE: Chronic | ICD-10-CM

## 2019-10-09 DIAGNOSIS — Z98.890 OTHER SPECIFIED POSTPROCEDURAL STATES: Chronic | ICD-10-CM

## 2019-10-09 DIAGNOSIS — Z96.642 PRESENCE OF LEFT ARTIFICIAL HIP JOINT: Chronic | ICD-10-CM

## 2019-10-09 DIAGNOSIS — Z01.818 ENCOUNTER FOR OTHER PREPROCEDURAL EXAMINATION: ICD-10-CM

## 2019-10-09 LAB
25(OH)D3 SERPL-MCNC: 29.8 NG/ML
ALP BLD-CCNC: 151 U/L
ALT SERPL-CCNC: 132 U/L
ANION GAP SERPL CALC-SCNC: 11 MMOL/L — SIGNIFICANT CHANGE UP (ref 5–17)
APPEARANCE: ABNORMAL
AST SERPL-CCNC: 68 U/L
BILIRUBIN URINE: NEGATIVE
BLOOD URINE: NEGATIVE
BUN SERPL-MCNC: 10 MG/DL — SIGNIFICANT CHANGE UP (ref 7–23)
CALCIUM SERPL-MCNC: 8.4 MG/DL — SIGNIFICANT CHANGE UP (ref 8.4–10.5)
CHLORIDE SERPL-SCNC: 103 MMOL/L — SIGNIFICANT CHANGE UP (ref 96–108)
CHOLEST SERPL-MCNC: 260 MG/DL
CHOLEST/HDLC SERPL: 3.8 RATIO
CO2 SERPL-SCNC: 24 MMOL/L — SIGNIFICANT CHANGE UP (ref 22–31)
COLOR: YELLOW
CREAT SERPL-MCNC: 0.59 MG/DL — SIGNIFICANT CHANGE UP (ref 0.5–1.3)
GLUCOSE BLDC GLUCOMTR-MCNC: 120 MG/DL — HIGH (ref 70–99)
GLUCOSE QUALITATIVE U: NEGATIVE
GLUCOSE SERPL-MCNC: 173 MG/DL — HIGH (ref 70–99)
HCT VFR BLD CALC: 38 % — SIGNIFICANT CHANGE UP (ref 34.5–45)
HDLC SERPL-MCNC: 69 MG/DL
HGB BLD-MCNC: 13.1 G/DL — SIGNIFICANT CHANGE UP (ref 11.5–15.5)
KETONES URINE: NEGATIVE
LDLC SERPL CALC-MCNC: 123 MG/DL
LEUKOCYTE ESTERASE URINE: ABNORMAL
MCHC RBC-ENTMCNC: 32.1 PG — SIGNIFICANT CHANGE UP (ref 27–34)
MCHC RBC-ENTMCNC: 34.5 GM/DL — SIGNIFICANT CHANGE UP (ref 32–36)
MCV RBC AUTO: 93.1 FL — SIGNIFICANT CHANGE UP (ref 80–100)
NITRITE URINE: NEGATIVE
NRBC # BLD: 0 /100 WBCS — SIGNIFICANT CHANGE UP (ref 0–0)
PH URINE: 6
PLATELET # BLD AUTO: 254 K/UL — SIGNIFICANT CHANGE UP (ref 150–400)
POTASSIUM SERPL-MCNC: 4.2 MMOL/L — SIGNIFICANT CHANGE UP (ref 3.5–5.3)
POTASSIUM SERPL-SCNC: 4.2 MMOL/L — SIGNIFICANT CHANGE UP (ref 3.5–5.3)
PROTEIN URINE: NEGATIVE
RBC # BLD: 4.08 M/UL — SIGNIFICANT CHANGE UP (ref 3.8–5.2)
RBC # FLD: 11.8 % — SIGNIFICANT CHANGE UP (ref 10.3–14.5)
SODIUM SERPL-SCNC: 138 MMOL/L — SIGNIFICANT CHANGE UP (ref 135–145)
SPECIFIC GRAVITY URINE: 1.02
TRIGL SERPL-MCNC: 339 MG/DL
TSH SERPL-ACNC: 3.51 UIU/ML
UROBILINOGEN URINE: NORMAL
WBC # BLD: 8.21 K/UL — SIGNIFICANT CHANGE UP (ref 3.8–10.5)
WBC # FLD AUTO: 8.21 K/UL — SIGNIFICANT CHANGE UP (ref 3.8–10.5)

## 2019-10-09 PROCEDURE — 76000 FLUOROSCOPY <1 HR PHYS/QHP: CPT

## 2019-10-09 PROCEDURE — C1713: CPT

## 2019-10-09 PROCEDURE — 88311 DECALCIFY TISSUE: CPT | Mod: 26

## 2019-10-09 PROCEDURE — C1776: CPT

## 2019-10-09 PROCEDURE — 72170 X-RAY EXAM OF PELVIS: CPT

## 2019-10-09 PROCEDURE — 82962 GLUCOSE BLOOD TEST: CPT

## 2019-10-09 PROCEDURE — 88305 TISSUE EXAM BY PATHOLOGIST: CPT | Mod: 26

## 2019-10-09 PROCEDURE — 80048 BASIC METABOLIC PNL TOTAL CA: CPT

## 2019-10-09 PROCEDURE — 27130 TOTAL HIP ARTHROPLASTY: CPT | Mod: RT

## 2019-10-09 PROCEDURE — 88305 TISSUE EXAM BY PATHOLOGIST: CPT

## 2019-10-09 PROCEDURE — 85027 COMPLETE CBC AUTOMATED: CPT

## 2019-10-09 PROCEDURE — 97165 OT EVAL LOW COMPLEX 30 MIN: CPT

## 2019-10-09 PROCEDURE — 88311 DECALCIFY TISSUE: CPT

## 2019-10-09 PROCEDURE — 97161 PT EVAL LOW COMPLEX 20 MIN: CPT

## 2019-10-09 RX ORDER — OXYCODONE HYDROCHLORIDE 5 MG/1
10 TABLET ORAL ONCE
Refills: 0 | Status: DISCONTINUED | OUTPATIENT
Start: 2019-10-09 | End: 2019-10-09

## 2019-10-09 RX ORDER — CEFAZOLIN SODIUM 1 G
2000 VIAL (EA) INJECTION ONCE
Refills: 0 | Status: COMPLETED | OUTPATIENT
Start: 2019-10-09 | End: 2019-10-09

## 2019-10-09 RX ORDER — CEPHALEXIN 500 MG
1 CAPSULE ORAL
Qty: 0 | Refills: 0 | DISCHARGE
Start: 2019-10-09

## 2019-10-09 RX ORDER — ASPIRIN/CALCIUM CARB/MAGNESIUM 324 MG
81 TABLET ORAL
Refills: 0 | Status: DISCONTINUED | OUTPATIENT
Start: 2019-10-09 | End: 2019-10-25

## 2019-10-09 RX ORDER — SODIUM CHLORIDE 9 MG/ML
500 INJECTION INTRAMUSCULAR; INTRAVENOUS; SUBCUTANEOUS ONCE
Refills: 0 | Status: DISCONTINUED | OUTPATIENT
Start: 2019-10-09 | End: 2019-10-25

## 2019-10-09 RX ORDER — OXYCODONE HYDROCHLORIDE 5 MG/1
1 TABLET ORAL
Qty: 0 | Refills: 0 | DISCHARGE
Start: 2019-10-09

## 2019-10-09 RX ORDER — GABAPENTIN 400 MG/1
100 CAPSULE ORAL EVERY 8 HOURS
Refills: 0 | Status: DISCONTINUED | OUTPATIENT
Start: 2019-10-10 | End: 2019-10-25

## 2019-10-09 RX ORDER — TRAMADOL HYDROCHLORIDE 50 MG/1
50 TABLET ORAL ONCE
Refills: 0 | Status: DISCONTINUED | OUTPATIENT
Start: 2019-10-09 | End: 2019-10-09

## 2019-10-09 RX ORDER — SODIUM CHLORIDE 9 MG/ML
1000 INJECTION, SOLUTION INTRAVENOUS
Refills: 0 | Status: DISCONTINUED | OUTPATIENT
Start: 2019-10-09 | End: 2019-10-25

## 2019-10-09 RX ORDER — DICLOFENAC SODIUM 75 MG/1
1 TABLET, DELAYED RELEASE ORAL
Qty: 0 | Refills: 0 | DISCHARGE

## 2019-10-09 RX ORDER — DOCUSATE SODIUM 100 MG
100 CAPSULE ORAL THREE TIMES A DAY
Refills: 0 | Status: DISCONTINUED | OUTPATIENT
Start: 2019-10-09 | End: 2019-10-25

## 2019-10-09 RX ORDER — ACETAMINOPHEN 500 MG
3 TABLET ORAL
Qty: 0 | Refills: 0 | DISCHARGE
Start: 2019-10-09

## 2019-10-09 RX ORDER — PANTOPRAZOLE SODIUM 20 MG/1
40 TABLET, DELAYED RELEASE ORAL ONCE
Refills: 0 | Status: COMPLETED | OUTPATIENT
Start: 2019-10-09 | End: 2019-10-09

## 2019-10-09 RX ORDER — ASPIRIN/CALCIUM CARB/MAGNESIUM 324 MG
1 TABLET ORAL
Qty: 0 | Refills: 0 | DISCHARGE
Start: 2019-10-09

## 2019-10-09 RX ORDER — CHLORHEXIDINE GLUCONATE 213 G/1000ML
1 SOLUTION TOPICAL ONCE
Refills: 0 | Status: COMPLETED | OUTPATIENT
Start: 2019-10-09 | End: 2019-10-09

## 2019-10-09 RX ORDER — TRAMADOL HYDROCHLORIDE 50 MG/1
1 TABLET ORAL
Qty: 0 | Refills: 0 | DISCHARGE
Start: 2019-10-09

## 2019-10-09 RX ORDER — OXYCODONE HYDROCHLORIDE 5 MG/1
5 TABLET ORAL ONCE
Refills: 0 | Status: DISCONTINUED | OUTPATIENT
Start: 2019-10-09 | End: 2019-10-09

## 2019-10-09 RX ORDER — KETOROLAC TROMETHAMINE 30 MG/ML
30 SYRINGE (ML) INJECTION ONCE
Refills: 0 | Status: DISCONTINUED | OUTPATIENT
Start: 2019-10-09 | End: 2019-10-09

## 2019-10-09 RX ORDER — PREGABALIN 225 MG/1
1 CAPSULE ORAL
Qty: 0 | Refills: 0 | DISCHARGE

## 2019-10-09 RX ORDER — CHOLECALCIFEROL (VITAMIN D3) 125 MCG
1 CAPSULE ORAL
Qty: 0 | Refills: 0 | DISCHARGE

## 2019-10-09 RX ORDER — SENNA PLUS 8.6 MG/1
2 TABLET ORAL AT BEDTIME
Refills: 0 | Status: DISCONTINUED | OUTPATIENT
Start: 2019-10-09 | End: 2019-10-25

## 2019-10-09 RX ORDER — CEPHALEXIN 500 MG
500 CAPSULE ORAL THREE TIMES A DAY
Refills: 0 | Status: DISCONTINUED | OUTPATIENT
Start: 2019-10-10 | End: 2019-10-25

## 2019-10-09 RX ORDER — PANTOPRAZOLE SODIUM 20 MG/1
1 TABLET, DELAYED RELEASE ORAL
Qty: 0 | Refills: 0 | DISCHARGE
Start: 2019-10-09

## 2019-10-09 RX ORDER — TRAMADOL HYDROCHLORIDE 50 MG/1
50 TABLET ORAL EVERY 8 HOURS
Refills: 0 | Status: COMPLETED | OUTPATIENT
Start: 2019-10-09 | End: 2019-10-16

## 2019-10-09 RX ORDER — ACETAMINOPHEN 325 MG/1
325 TABLET, FILM COATED ORAL EVERY 8 HOURS
Qty: 180 | Refills: 0 | Status: ACTIVE | COMMUNITY
Start: 2019-10-09 | End: 1900-01-01

## 2019-10-09 RX ORDER — SENNA PLUS 8.6 MG/1
2 TABLET ORAL
Qty: 0 | Refills: 0 | DISCHARGE
Start: 2019-10-09

## 2019-10-09 RX ORDER — ACETAMINOPHEN 500 MG
975 TABLET ORAL ONCE
Refills: 0 | Status: COMPLETED | OUTPATIENT
Start: 2019-10-09 | End: 2019-10-09

## 2019-10-09 RX ORDER — DOCUSATE SODIUM 100 MG
1 CAPSULE ORAL
Qty: 0 | Refills: 0 | DISCHARGE
Start: 2019-10-09

## 2019-10-09 RX ORDER — OXYCODONE HYDROCHLORIDE 5 MG/1
2.5 TABLET ORAL EVERY 4 HOURS
Refills: 0 | Status: DISCONTINUED | OUTPATIENT
Start: 2019-10-09 | End: 2019-10-09

## 2019-10-09 RX ORDER — ACETAMINOPHEN 500 MG
975 TABLET ORAL EVERY 8 HOURS
Refills: 0 | Status: DISCONTINUED | OUTPATIENT
Start: 2019-10-09 | End: 2019-10-25

## 2019-10-09 RX ORDER — ONDANSETRON 8 MG/1
4 TABLET, FILM COATED ORAL ONCE
Refills: 0 | Status: COMPLETED | OUTPATIENT
Start: 2019-10-09 | End: 2019-10-09

## 2019-10-09 RX ORDER — PANTOPRAZOLE SODIUM 20 MG/1
40 TABLET, DELAYED RELEASE ORAL
Refills: 0 | Status: DISCONTINUED | OUTPATIENT
Start: 2019-10-09 | End: 2019-10-25

## 2019-10-09 RX ORDER — ONDANSETRON 8 MG/1
4 TABLET, FILM COATED ORAL EVERY 6 HOURS
Refills: 0 | Status: DISCONTINUED | OUTPATIENT
Start: 2019-10-09 | End: 2019-10-25

## 2019-10-09 RX ORDER — GABAPENTIN 400 MG/1
1 CAPSULE ORAL
Qty: 0 | Refills: 0 | DISCHARGE
Start: 2019-10-09

## 2019-10-09 RX ADMIN — TRAMADOL HYDROCHLORIDE 50 MILLIGRAM(S): 50 TABLET ORAL at 06:08

## 2019-10-09 RX ADMIN — Medication 30 MILLIGRAM(S): at 15:33

## 2019-10-09 RX ADMIN — ONDANSETRON 4 MILLIGRAM(S): 8 TABLET, FILM COATED ORAL at 10:02

## 2019-10-09 RX ADMIN — TRAMADOL HYDROCHLORIDE 50 MILLIGRAM(S): 50 TABLET ORAL at 14:17

## 2019-10-09 RX ADMIN — Medication 100 MILLIGRAM(S): at 15:00

## 2019-10-09 RX ADMIN — Medication 100 MILLIGRAM(S): at 15:32

## 2019-10-09 RX ADMIN — OXYCODONE HYDROCHLORIDE 10 MILLIGRAM(S): 5 TABLET ORAL at 10:03

## 2019-10-09 RX ADMIN — Medication 150 MILLIGRAM(S): at 06:08

## 2019-10-09 RX ADMIN — Medication 975 MILLIGRAM(S): at 06:08

## 2019-10-09 RX ADMIN — Medication 975 MILLIGRAM(S): at 14:17

## 2019-10-09 RX ADMIN — PANTOPRAZOLE SODIUM 40 MILLIGRAM(S): 20 TABLET, DELAYED RELEASE ORAL at 06:08

## 2019-10-09 RX ADMIN — Medication 81 MILLIGRAM(S): at 15:32

## 2019-10-09 RX ADMIN — CHLORHEXIDINE GLUCONATE 1 APPLICATION(S): 213 SOLUTION TOPICAL at 06:00

## 2019-10-09 NOTE — ASU DISCHARGE PLAN (ADULT/PEDIATRIC) - CALL YOUR DOCTOR IF YOU HAVE ANY OF THE FOLLOWING:
Numbness, tingling, color or temperature change to extremity/Bleeding that does not stop/Wound/Surgical Site with redness, or foul smelling discharge or pus/Pain not relieved by Medications

## 2019-10-09 NOTE — ASU PATIENT PROFILE, ADULT - PMH
Anxiety and depression  hx depression during yrs of menstruation/ stable now  Hyperlipidemia  on meds  Murmur  benign  Osteoarthrosis  hip replacement left 2014  Rosacea

## 2019-10-09 NOTE — ASU PREOP CHECKLIST - TO WHOM
Robert Hardwick RN Robert Hardwick RN / report received from SANJAY Frances RN Roshni Early RN  report received from SANJAY Frances RN

## 2019-10-09 NOTE — OCCUPATIONAL THERAPY INITIAL EVALUATION ADULT - PERTINENT HX OF CURRENT PROBLEM, REHAB EVAL
Pt is a 63 y/o female presenting to ambulatory surgery on  10/9/19 hx of hyperlipidemia, smoker .5 PPD x 30 years ans osteoarthritis with previous hip replacement 2014 left . Patient has worsening right  hip  pain recently. Pt is now s/p R MOLLY

## 2019-10-09 NOTE — ASU DISCHARGE PLAN (ADULT/PEDIATRIC) - CARE PROVIDER_API CALL
Jourdan Woods)  Orthopaedic Surgery  [address], [address]  [place] [address]  Phone: [phone]  Fax: [phone]  Follow Up Time:

## 2019-10-09 NOTE — ASU DISCHARGE PLAN (ADULT/PEDIATRIC) - ASU DC SPECIAL INSTRUCTIONSFT
DRESSING and SHOWER instructions  Keep entire dressing intact for 48 hours following surgery. Remove the outer dressing (tape and gauze) 48 hours post op, and keep mepilex dressing intact. you may shower at 48 hrs post op, keep water away from the dressing.   The mepliex dressing will be removed at your first post op appointment.    Angelina Guzmán PA-C  [phone]

## 2019-10-09 NOTE — PHYSICAL THERAPY INITIAL EVALUATION ADULT - PERTINENT HX OF CURRENT PROBLEM, REHAB EVAL
Pt is a 65 y/o female presenting to ambulatory surgery on  10/9/19 hx of hyperlipidemia, smoker .5 PPD x 30 years ans osteoarthritis with previous hip replacement 2014 left . Patient has worsening right  hip  pain recently. Pt is now s/p R MOLLY

## 2019-10-09 NOTE — ASU PATIENT PROFILE, ADULT - PSH
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  History of bilateral cataract extraction    History of hand surgery  basal anthroplasty- left  History of total hip replacement, left  9/9/2014  S/P laminectomy  microdiscectomy L5 2004

## 2019-10-09 NOTE — ASU DISCHARGE PLAN (ADULT/PEDIATRIC) - "IF YOU OR YOUR GUARDIAN/FAMILY IS A SMOKER, IT IS IMPORTANT FOR YOUR HEALTH TO STOP SMOKING. PLEASE BE AWARE THAT SECOND HAND SMOKE IS ALSO HARMFUL."
Statement Selected
Alzheimer's dementia    Chronic diastolic heart failure    Depression    Dyslipidemia    H/O non-ST elevation myocardial infarction (NSTEMI)    Hypertension

## 2019-10-09 NOTE — OCCUPATIONAL THERAPY INITIAL EVALUATION ADULT - LIVES WITH, PROFILE
Pt lives with sister in a private house with 14 steps inside + HR. Pt was Ind with all ADLs and amb without AD

## 2019-10-09 NOTE — PHYSICAL THERAPY INITIAL EVALUATION ADULT - ADDITIONAL COMMENTS
Pt lives with sister in a private house with 14 steps inside + HR. Pt was Ind with all ADLs and amb without AD.

## 2019-10-10 ENCOUNTER — RESULT REVIEW (OUTPATIENT)
Age: 64
End: 2019-10-10

## 2019-10-14 LAB — SURGICAL PATHOLOGY STUDY: SIGNIFICANT CHANGE UP

## 2019-10-25 ENCOUNTER — APPOINTMENT (OUTPATIENT)
Dept: ORTHOPEDIC SURGERY | Facility: CLINIC | Age: 64
End: 2019-10-25
Payer: MEDICAID

## 2019-10-25 VITALS
SYSTOLIC BLOOD PRESSURE: 143 MMHG | BODY MASS INDEX: 26.22 KG/M2 | HEART RATE: 81 BPM | DIASTOLIC BLOOD PRESSURE: 83 MMHG | WEIGHT: 177 LBS | HEIGHT: 69 IN

## 2019-10-25 PROCEDURE — 73502 X-RAY EXAM HIP UNI 2-3 VIEWS: CPT | Mod: RT

## 2019-10-25 PROCEDURE — 99024 POSTOP FOLLOW-UP VISIT: CPT

## 2019-10-30 ENCOUNTER — APPOINTMENT (OUTPATIENT)
Dept: ORTHOPEDIC SURGERY | Facility: CLINIC | Age: 64
End: 2019-10-30

## 2019-11-18 ENCOUNTER — RX RENEWAL (OUTPATIENT)
Age: 64
End: 2019-11-18

## 2019-12-04 ENCOUNTER — RX RENEWAL (OUTPATIENT)
Age: 64
End: 2019-12-04

## 2019-12-06 ENCOUNTER — APPOINTMENT (OUTPATIENT)
Dept: ORTHOPEDIC SURGERY | Facility: CLINIC | Age: 64
End: 2019-12-06
Payer: MEDICAID

## 2019-12-06 VITALS
HEART RATE: 67 BPM | WEIGHT: 177 LBS | DIASTOLIC BLOOD PRESSURE: 83 MMHG | SYSTOLIC BLOOD PRESSURE: 149 MMHG | BODY MASS INDEX: 26.22 KG/M2 | HEIGHT: 69 IN

## 2019-12-06 PROCEDURE — 99024 POSTOP FOLLOW-UP VISIT: CPT

## 2019-12-06 NOTE — HISTORY OF PRESENT ILLNESS
[3] : the patient reports pain that is 3/10 in severity [Clean/Dry/Intact] : clean, dry and intact [Neuro Intact] : an unremarkable neurological exam [Vascular Intact] : ~T peripheral vascular exam normal [Negative Charles's] : maneuvers demonstrated a negative Charles's sign [Doing Well] : is doing well [Adequate Pain Control] : has adequate pain control [No Sign of Infection] : is showing no signs of infection [Healed] : healed [de-identified] : s/p right total hip replacement\par  [de-identified] : Ms. CURT DICKENS  presents s/p right total hip replacement.  She is participating in outpatient physical therapy two times per week. She notes she is now able to climb the stairs, but notes descending stairs she is still favoring the right leg, she notes mostly due to fear. \par She is taking medications for pain, meloxicam and Flexeril, but notes they are more for the back pain. [de-identified] : right  hip: Walks with right stiff hip gait.  There is a well healed scar of surgery with no significant swelling, redness, or tenderness. Range of motion of the hip: active SLR of 60 degrees and hip flexion to 90 degrees Abduction 30 degrees adduction 15 degrees external rotation 30 degrees internal rotation 15 degrees with hip abductor extensor power grade +4.  There is quad tightness. \par \par  [de-identified] : The patient was informed of the findings and recommended conservative management in the form of a home exercise program, activity modifications, stationary bicycling, and ambulation with a heel toe gait.  A prescription for a course of physical therapy was provided to continue.  Prescriptions for meloxciam have been provided for pain control.  She has completed ASA for DVT ppx.  The risks, benefits, and side effects were discussed.  Follow up appointment was recommended annually. \par  [de-identified] : AP, and false profile views of the right hip and AP view of the pelvis taken last visit reveal a well fixed and aligned right total hip replacement with no signs of mechanical failure or periprosthetic fracture.\par

## 2019-12-17 NOTE — HISTORY OF PRESENT ILLNESS
[3] : the patient reports pain that is 3/10 in severity [Clean/Dry/Intact] : clean, dry and intact [Neuro Intact] : an unremarkable neurological exam [Vascular Intact] : ~T peripheral vascular exam normal [Negative Charles's] : maneuvers demonstrated a negative Charles's sign [Doing Well] : is doing well [No Sign of Infection] : is showing no signs of infection [Adequate Pain Control] : has adequate pain control [de-identified] : s/p right total hip replacement\par  [de-identified] : Ms. CURT DICKENS  presents s/p right total hip replacement.  she  is participating in home physical therapy as well as a home exercise program daily. she  is taking tramadol, gabapentin, tylenol for pain and pain level is controlled. she  is taking ecotrin for DVT ppx [de-identified] : right  hip: Walks with right stiff hip gait. The dressing was removed, incision was cleaned with alcohol, and steri strips applied. There is a well healed scar of surgery with no significant swelling, redness, or tenderness. Range of motion of the hip: active SLR of 30 degrees and hip flexion to 60 degrees Abduction 30 degrees adduction 15 degrees external rotation 30 degrees internal rotation 15 degrees with hip abductor extensor power grade +4.  There is quad tightness. \par \par  [de-identified] : AP, and false profile views of the right hip and AP view of the pelvis taken today reveal a well fixed and aligned right total hip replacement with no signs of mechanical failure or periprosthetic fracture.\par  [de-identified] : The patient was informed of the findings and recommended conservative management in the form of a home exercise program, activity modifications, stationary bicycling, and ambulation with a cane.  A prescription for a course of physical therapy was provided.  Prescriptions for tramadol, gabapentin and meloxciam have been provided for pain control.  A long discussion was had with the patient advising them to wean from use of the narcotic medication as tolerated.  She will take aspirin for DVT prophylaxis for another four weeks.  The risks, benefits, and side effects were discussed.  Follow up appointment was recommended in six weeks.\par

## 2020-01-07 ENCOUNTER — FORM ENCOUNTER (OUTPATIENT)
Age: 65
End: 2020-01-07

## 2020-01-17 ENCOUNTER — TRANSCRIPTION ENCOUNTER (OUTPATIENT)
Age: 65
End: 2020-01-17

## 2020-06-23 ENCOUNTER — RX RENEWAL (OUTPATIENT)
Age: 65
End: 2020-06-23

## 2020-06-24 ENCOUNTER — RX RENEWAL (OUTPATIENT)
Age: 65
End: 2020-06-24

## 2020-07-02 ENCOUNTER — APPOINTMENT (OUTPATIENT)
Dept: INTERNAL MEDICINE | Facility: CLINIC | Age: 65
End: 2020-07-02
Payer: MEDICAID

## 2020-07-02 VITALS
SYSTOLIC BLOOD PRESSURE: 152 MMHG | OXYGEN SATURATION: 97 % | WEIGHT: 189 LBS | DIASTOLIC BLOOD PRESSURE: 93 MMHG | HEART RATE: 74 BPM | TEMPERATURE: 98.6 F | HEIGHT: 69 IN | BODY MASS INDEX: 27.99 KG/M2

## 2020-07-02 PROCEDURE — 99214 OFFICE O/P EST MOD 30 MIN: CPT

## 2020-07-02 RX ORDER — FAMOTIDINE 40 MG/1
40 TABLET, FILM COATED ORAL
Qty: 2 | Refills: 0 | Status: DISCONTINUED | COMMUNITY
Start: 2019-06-25 | End: 2020-07-02

## 2020-07-02 RX ORDER — TRAMADOL HYDROCHLORIDE 50 MG/1
50 TABLET, COATED ORAL 3 TIMES DAILY
Qty: 90 | Refills: 0 | Status: DISCONTINUED | COMMUNITY
Start: 2019-10-22 | End: 2020-07-02

## 2020-07-02 RX ORDER — TRAMADOL HYDROCHLORIDE 50 MG/1
50 TABLET, COATED ORAL 3 TIMES DAILY
Qty: 21 | Refills: 0 | Status: DISCONTINUED | COMMUNITY
Start: 2019-10-09 | End: 2020-07-02

## 2020-07-02 RX ORDER — ACETAMINOPHEN 500 MG/1
500 TABLET, COATED ORAL
Qty: 2 | Refills: 0 | Status: COMPLETED | COMMUNITY
Start: 2019-09-30 | End: 2020-07-02

## 2020-07-02 RX ORDER — CHLORHEXIDINE GLUCONATE 4 G/100ML
4 SOLUTION TOPICAL
Qty: 1 | Refills: 0 | Status: DISCONTINUED | COMMUNITY
Start: 2019-06-28 | End: 2020-07-02

## 2020-07-02 RX ORDER — MELOXICAM 15 MG/1
15 TABLET ORAL DAILY
Qty: 30 | Refills: 1 | Status: DISCONTINUED | COMMUNITY
Start: 2019-12-06 | End: 2020-07-02

## 2020-07-02 RX ORDER — PANTOPRAZOLE 40 MG/1
40 TABLET, DELAYED RELEASE ORAL
Qty: 30 | Refills: 1 | Status: DISCONTINUED | COMMUNITY
Start: 2019-06-24 | End: 2020-07-02

## 2020-07-02 RX ORDER — OXYCODONE 5 MG/1
5 TABLET ORAL
Qty: 80 | Refills: 0 | Status: DISCONTINUED | COMMUNITY
Start: 2019-10-09 | End: 2020-07-02

## 2020-07-02 RX ORDER — DOCUSATE SODIUM 100 MG/1
100 CAPSULE ORAL 3 TIMES DAILY
Qty: 90 | Refills: 0 | Status: DISCONTINUED | COMMUNITY
Start: 2019-10-09 | End: 2020-07-02

## 2020-07-02 RX ORDER — OXYCODONE 5 MG/1
5 TABLET ORAL
Qty: 84 | Refills: 0 | Status: DISCONTINUED | COMMUNITY
Start: 2019-06-24 | End: 2020-07-02

## 2020-07-02 RX ORDER — MELOXICAM 15 MG/1
15 TABLET ORAL
Qty: 30 | Refills: 0 | Status: DISCONTINUED | COMMUNITY
Start: 2019-10-25 | End: 2020-07-02

## 2020-07-02 RX ORDER — ASPIRIN/ACETAMINOPHEN/CAFFEINE 500-325-65
325 POWDER IN PACKET (EA) ORAL
Qty: 84 | Refills: 0 | Status: COMPLETED | COMMUNITY
Start: 2019-06-24 | End: 2020-07-02

## 2020-07-02 RX ORDER — GABAPENTIN 100 MG/1
100 CAPSULE ORAL 3 TIMES DAILY
Qty: 90 | Refills: 0 | Status: DISCONTINUED | COMMUNITY
Start: 2019-06-24 | End: 2020-07-02

## 2020-07-02 RX ORDER — GABAPENTIN 100 MG/1
100 CAPSULE ORAL 3 TIMES DAILY
Qty: 90 | Refills: 0 | Status: DISCONTINUED | COMMUNITY
Start: 2019-10-25 | End: 2020-07-02

## 2020-07-02 RX ORDER — DOCUSATE SODIUM 100 MG/1
100 CAPSULE ORAL 3 TIMES DAILY
Qty: 90 | Refills: 0 | Status: DISCONTINUED | COMMUNITY
Start: 2019-06-24 | End: 2020-07-02

## 2020-07-02 RX ORDER — GABAPENTIN 100 MG/1
100 CAPSULE ORAL 3 TIMES DAILY
Qty: 90 | Refills: 0 | Status: DISCONTINUED | COMMUNITY
Start: 2019-10-09 | End: 2020-07-02

## 2020-07-02 RX ORDER — MUPIROCIN 20 MG/G
2 OINTMENT TOPICAL
Qty: 1 | Refills: 0 | Status: COMPLETED | COMMUNITY
Start: 2019-09-30 | End: 2020-07-02

## 2020-07-02 RX ORDER — SENNA 8.6 MG/1
8.6 TABLET, FILM COATED ORAL
Qty: 30 | Refills: 0 | Status: COMPLETED | COMMUNITY
Start: 2019-10-09 | End: 2020-07-02

## 2020-07-02 RX ORDER — TRAMADOL HYDROCHLORIDE 50 MG/1
50 TABLET, COATED ORAL 3 TIMES DAILY
Qty: 90 | Refills: 0 | Status: DISCONTINUED | COMMUNITY
Start: 2019-06-24 | End: 2020-07-02

## 2020-07-02 RX ORDER — CEPHALEXIN 500 MG/1
500 CAPSULE ORAL 3 TIMES DAILY
Qty: 3 | Refills: 0 | Status: DISCONTINUED | COMMUNITY
Start: 2019-10-09 | End: 2020-07-02

## 2020-07-02 NOTE — REVIEW OF SYSTEMS
[Sore Throat] : sore throat [Hoarseness] : hoarseness [Heartburn] : heartburn [Swollen Glands] : swollen glands [Negative] : Integumentary [Fever] : no fever [Fatigue] : no fatigue [Chills] : no chills [Hot Flashes] : no hot flashes [Earache] : no earache [Hearing Loss] : no hearing loss [Postnasal Drip] : no postnasal drip [Nosebleed] : no nosebleeds [Nasal Discharge] : no nasal discharge [Abdominal Pain] : no abdominal pain [Constipation] : no constipation [Nausea] : no nausea [Diarrhea] : diarrhea [Headache] : no headache [Vomiting] : no vomiting [Dizziness] : no dizziness [FreeTextEntry7] : +trouble swallowing  [Easy Bruising] : no easy bruising [Easy Bleeding] : no easy bleeding

## 2020-07-02 NOTE — HISTORY OF PRESENT ILLNESS
[FreeTextEntry8] : 64 y/o female patient presents today:\par - c/o issues swallowing over past 1 month, feels like something is stuck in throat, worse with liquids,  denies any regurgitation of food or liquids, has hx of GERD but no longer taking pantoprazole, former smoker, states had EGD done several years ago, symptoms worsened over past 4-5 days, now having pain, feels her lymph nodes are swollen, denies any fever, chills, cough, SOB, denies any Covid contact \par

## 2020-07-02 NOTE — PHYSICAL EXAM
[Well Nourished] : well nourished [Well Developed] : well developed [Normal Outer Ear/Nose] : the outer ears and nose were normal in appearance [Well-Appearing] : well-appearing [Normal TMs] : both tympanic membranes were normal [No JVD] : no jugular venous distention [Normal Nasal Mucosa] : the nasal mucosa was normal [Normal] : no respiratory distress, lungs were clear to auscultation bilaterally and no accessory muscle use [Supple] : supple [Non-distended] : non-distended [Soft] : abdomen soft [Non Tender] : non-tender [Speech Grossly Normal] : speech grossly normal [Memory Grossly Normal] : memory grossly normal [Normal Insight/Judgement] : insight and judgment were intact [Alert and Oriented x3] : oriented to person, place, and time [Normal Affect] : the affect was normal [de-identified] : +oropharynx erythematous, inflammed, no exudates  [de-identified] : +patient anxious about possible diagnosis, concerned for cancer, sister recently diagnosed with breast cancer [de-identified] : +mild anterior cervical lymphadenopathy B/L  [de-identified] : +thyroid nodules

## 2020-07-09 ENCOUNTER — APPOINTMENT (OUTPATIENT)
Dept: INTERNAL MEDICINE | Facility: CLINIC | Age: 65
End: 2020-07-09

## 2020-07-10 ENCOUNTER — APPOINTMENT (OUTPATIENT)
Dept: ORTHOPEDIC SURGERY | Facility: CLINIC | Age: 65
End: 2020-07-10
Payer: MEDICAID

## 2020-07-10 ENCOUNTER — APPOINTMENT (OUTPATIENT)
Dept: INTERNAL MEDICINE | Facility: CLINIC | Age: 65
End: 2020-07-10
Payer: MEDICAID

## 2020-07-10 VITALS
HEART RATE: 96 BPM | BODY MASS INDEX: 27.99 KG/M2 | SYSTOLIC BLOOD PRESSURE: 130 MMHG | HEIGHT: 69 IN | DIASTOLIC BLOOD PRESSURE: 80 MMHG | OXYGEN SATURATION: 99 % | WEIGHT: 189 LBS

## 2020-07-10 VITALS
HEIGHT: 69 IN | WEIGHT: 189 LBS | SYSTOLIC BLOOD PRESSURE: 141 MMHG | BODY MASS INDEX: 27.99 KG/M2 | HEART RATE: 79 BPM | DIASTOLIC BLOOD PRESSURE: 79 MMHG

## 2020-07-10 DIAGNOSIS — R03.0 ELEVATED BLOOD-PRESSURE READING, W/OUT DIAGNOSIS OF HYPERTENSION: ICD-10-CM

## 2020-07-10 PROCEDURE — 99214 OFFICE O/P EST MOD 30 MIN: CPT

## 2020-07-10 PROCEDURE — 73521 X-RAY EXAM HIPS BI 2 VIEWS: CPT

## 2020-07-10 RX ORDER — PREDNISONE 20 MG/1
20 TABLET ORAL DAILY
Qty: 5 | Refills: 0 | Status: DISCONTINUED | COMMUNITY
Start: 2020-07-02 | End: 2020-07-10

## 2020-07-10 RX ORDER — CEPHALEXIN 500 MG/1
500 TABLET ORAL TWICE DAILY
Qty: 14 | Refills: 0 | Status: COMPLETED | COMMUNITY
Start: 2020-07-02 | End: 2020-07-10

## 2020-07-10 NOTE — PHYSICAL EXAM
[Normal Outer Ear/Nose] : the outer ears and nose were normal in appearance [Normal Oropharynx] : the oropharynx was normal [Normal TMs] : both tympanic membranes were normal [Normal Nasal Mucosa] : the nasal mucosa was normal [Supple] : supple [No Lymphadenopathy] : no lymphadenopathy [Pedal Pulses Present] : the pedal pulses are present [No Edema] : there was no peripheral edema [de-identified] : +varicosities B/L  [Normal] : no posterior cervical lymphadenopathy and no anterior cervical lymphadenopathy

## 2020-07-13 NOTE — HISTORY OF PRESENT ILLNESS
[Stable] : stable [4] : a current pain level of 4/10 [Intermit.] : ~He/She~ states the symptoms seem to be intermittent [de-identified] : Ms. CURT DICKENS is a 65 year old female presents s/p right total hip replacement 10/9/2019, presenting for a follow up. \par She notes a fall backward onto her buttocks about three weeks ago after looking upward. She notes she has had worsening vertigo-like symptoms. She has been having worsening right hip pain since then. She notes overall her recovery has not been as easy compared to the recovery from her left total hip replacement. \par She has been doing exercise on her own, as well as completed physical therapy, but notes her right leg feels weaker and overall more painful than the left. She also notes she has had a lot of swelling to the right LE, with swelling mostly around the shin ankle and right foot. She was seen by a podiatrist as well for this, and was put into a ankle brace as the podiatrist thought it may have been a foot and ankle injury, but notes the swelling has not subsided. She notes it subsides with elevation, but returns if she is seated or in a dependent position. \par She presents for review, to make sure her right hip replacement is stable. \par

## 2020-07-13 NOTE — PHYSICAL EXAM
[de-identified] : On general examination the patient is adequately groomed and nourished. The vital parameters are as recorded. \par There is no lymphedema, bilateral LE varicose veins with swelling more pronounced right more than left LE, no skin warmth/erythema/scars/swelling, no ulcers and no palpable lymph nodes or masses in both lower extremities. Bilateral pedal pulses are well palpable.\par Upper Extremity:\par Both right and left upper extremities are unremarkable in terms of skin rash, lesions, pigmentation, redness, tenderness, swelling, joint instability, abnormal deformity or crepitus. The overall range of motion, sensation, motor tone and strength testing are normal.\par \par bilateral  hip: Walks with right stiff hip gait. There is a well healed scar of surgery with no significant swelling, redness, or tenderness. Range of motion of the right hip: active SLR of 30 degrees and hip flexion to 60 degrees Abduction 30 degrees adduction 15 degrees external rotation 30 degrees internal rotation 15 degrees with hip abductor extensor power grade +4. Left hip ROM and strength is normal. \par \par Spine: There is no curvature of the spine or loss of normal lumbar lordosis. The skin is devoid of erythema, scars, pigmentation or rashes. There is no lumbar spasm and no tenderness especially at L4-L5 or L5-S1. \par The range of motion of the lumbar spine is normal and painless in flexion, extension, lateral flexion and rotation. There is no lumbar spine imbalance or instability detected.\par Bilateral passive SLR is 60 degrees in supine and sitting positions. Lasegue's Test is negative.\par Neurology:\par The patient is alert and oriented in person, place and time. The mood is calm and affect is normal.\par Testing for coordination including Rhomberg's Test and Finger-Nose Test, sensation, motor tone and power and deep tendon reflexes in both lower extremities is normal.\par \par \par  [de-identified] : The following radiographs were ordered and read by me during this patients visit. I reviewed each radiograph in detail with the patient and discussed the findings as highlighted below.\par AP and false profile Radiographs of the bilateral hip and pelvis taken today reveal a well fixed and aligned bilateral total hip replacement with no signs of mechanical failure or periprosthetic fracture.\par \par

## 2020-07-13 NOTE — DISCUSSION/SUMMARY
[de-identified] : S/P bilateral hip replacements, stable, with LE swelling secondary to varicose veins. \par She has been reassured that her implants remain stable. She has been recommended for routine home exercise to maintain strength and mobility. Concerning the LE swelling, she has been advised it is likely from vascular insufficiency/varicose veins. Concerning the swelling, compression has been recommended, as well as elevation of the lower extremities. She  has been recommended for exercise, including foot pumps and walking. I have provided the patient a referral to Dr. Gonzalez for evaluation of PVD. \par She will follow up annually.

## 2020-07-23 NOTE — OCCUPATIONAL THERAPY INITIAL EVALUATION ADULT - SOCIAL CONCERNS
Detail Level: Zone
Quality 265: Biopsy Follow-Up: Biopsy results reviewed, communicated, tracked, and documented
Quality 226: Preventive Care And Screening: Tobacco Use: Screening And Cessation Intervention: Patient screened for tobacco use and is an ex/non-smoker
Detail Level: Detailed
None

## 2020-08-12 ENCOUNTER — APPOINTMENT (OUTPATIENT)
Dept: ORTHOPEDIC SURGERY | Facility: CLINIC | Age: 65
End: 2020-08-12
Payer: MEDICAID

## 2020-08-12 VITALS
DIASTOLIC BLOOD PRESSURE: 76 MMHG | HEIGHT: 69 IN | BODY MASS INDEX: 27.99 KG/M2 | HEART RATE: 88 BPM | WEIGHT: 189 LBS | SYSTOLIC BLOOD PRESSURE: 178 MMHG

## 2020-08-12 PROCEDURE — 99214 OFFICE O/P EST MOD 30 MIN: CPT | Mod: 25

## 2020-08-12 PROCEDURE — 20553 NJX 1/MLT TRIGGER POINTS 3/>: CPT

## 2020-08-12 NOTE — HISTORY OF PRESENT ILLNESS
[Stable] : stable [de-identified] : 65 year female presents for follow up of lower back pain.\rosetta Last seen in May 2018 for R sided lumbar radiculopathy.\rosetta Currently has sharp pain at L sacroiliac region. \rosetta Can't ambulate long distances due to pain. \par Pain does not radiate down her legs, only localized at L sacroiliac region.\par S/P R THR in Oct 2019. \par No fever chills sweats nausea vomiting no bowel or bladder dysfunction, no recent weight loss or gain no night pain. This history is in addition to the intake form that I personally reviewed.

## 2020-08-12 NOTE — ADDENDUM
[FreeTextEntry1] : This note was authored by Ashlie Alfonso working as a medical scribe for Dr. Emerson Mathur. The note was reviewed, edited, and revised by Dr. Emerson Mathur whom is in agreement with the exam findings, imaging findings, and treatment plan. 08/12/2020.

## 2020-08-12 NOTE — PHYSICAL EXAM
[de-identified] : 5 out of 5 motor strength, sensation is intact and symmetrical full range of motion flexion extension and rotation, no palpatory tenderness full range of motion of hips knees shoulders and elbows (all four extremities), no atrophy, negative straight leg raise, no pathological reflexes, no swelling, normal ambulation, no apparent distress skin is intact, no palpable lymph nodes, no upper or lower extremity instability, alert and oriented x3 and normal mood. Normal finger-to nose test. Mild swelling right leg after MOLLY. No calf tenderness. Pain over Left SI joint.

## 2020-08-12 NOTE — DISCUSSION/SUMMARY
[de-identified] : Left Sacroiliitis \par We discussed all options. \par I offered an injection after all risks were explained including allergic reaction to an infection under sterile conditions 1 mg of Depo-Medrol and 2 cc of 1% lidocaine without epinephrine was injected into the painful site. The patient tolerated the procedure well and received significant relief following the injection.\par All questions were answered, all alternatives discussed and the patient is in complete agreement with that plan. Follow-up appointment as instructed. Any issues and the patient will call or come in sooner.

## 2020-08-14 ENCOUNTER — APPOINTMENT (OUTPATIENT)
Dept: INTERNAL MEDICINE | Facility: CLINIC | Age: 65
End: 2020-08-14
Payer: MEDICAID

## 2020-08-14 ENCOUNTER — NON-APPOINTMENT (OUTPATIENT)
Age: 65
End: 2020-08-14

## 2020-08-14 ENCOUNTER — LABORATORY RESULT (OUTPATIENT)
Age: 65
End: 2020-08-14

## 2020-08-14 VITALS
OXYGEN SATURATION: 98 % | HEART RATE: 78 BPM | BODY MASS INDEX: 27.85 KG/M2 | TEMPERATURE: 98.5 F | DIASTOLIC BLOOD PRESSURE: 85 MMHG | WEIGHT: 188 LBS | HEIGHT: 69 IN | SYSTOLIC BLOOD PRESSURE: 143 MMHG

## 2020-08-14 DIAGNOSIS — I83.899 VARICOSE VEINS OF UNSPECIFIED LOWER EXTREMITY WITH OTHER COMPLICATIONS: ICD-10-CM

## 2020-08-14 DIAGNOSIS — Z87.09 PERSONAL HISTORY OF OTHER DISEASES OF THE RESPIRATORY SYSTEM: ICD-10-CM

## 2020-08-14 DIAGNOSIS — M16.11 UNILATERAL PRIMARY OSTEOARTHRITIS, RIGHT HIP: ICD-10-CM

## 2020-08-14 DIAGNOSIS — M62.838 OTHER MUSCLE SPASM: ICD-10-CM

## 2020-08-14 DIAGNOSIS — F17.200 NICOTINE DEPENDENCE, UNSPECIFIED, UNCOMPLICATED: ICD-10-CM

## 2020-08-14 PROCEDURE — 36415 COLL VENOUS BLD VENIPUNCTURE: CPT

## 2020-08-14 PROCEDURE — G0296 VISIT TO DETERM LDCT ELIG: CPT | Mod: NC

## 2020-08-14 PROCEDURE — 99396 PREV VISIT EST AGE 40-64: CPT | Mod: 25

## 2020-08-14 PROCEDURE — 93000 ELECTROCARDIOGRAM COMPLETE: CPT

## 2020-08-14 PROCEDURE — 99213 OFFICE O/P EST LOW 20 MIN: CPT | Mod: 25

## 2020-08-24 LAB
25(OH)D3 SERPL-MCNC: 45.1 NG/ML
ALBUMIN SERPL ELPH-MCNC: 5.1 G/DL
ALP BLD-CCNC: 176 U/L
ALT SERPL-CCNC: 176 U/L
ANION GAP SERPL CALC-SCNC: 13 MMOL/L
APPEARANCE: CLEAR
AST SERPL-CCNC: 80 U/L
BASOPHILS # BLD AUTO: 0.04 K/UL
BASOPHILS NFR BLD AUTO: 0.7 %
BILIRUB SERPL-MCNC: 0.4 MG/DL
BILIRUBIN URINE: NEGATIVE
BLOOD URINE: NEGATIVE
BUN SERPL-MCNC: 12 MG/DL
CALCIUM SERPL-MCNC: 10.1 MG/DL
CHLORIDE SERPL-SCNC: 105 MMOL/L
CHOLEST SERPL-MCNC: 205 MG/DL
CHOLEST/HDLC SERPL: 2.6 RATIO
CO2 SERPL-SCNC: 22 MMOL/L
COLOR: NORMAL
CREAT SERPL-MCNC: 0.58 MG/DL
CREAT SPEC-SCNC: 52 MG/DL
EOSINOPHIL # BLD AUTO: 0.21 K/UL
EOSINOPHIL NFR BLD AUTO: 3.4 %
ESTIMATED AVERAGE GLUCOSE: 134 MG/DL
FOLATE SERPL-MCNC: 11.8 NG/ML
GLUCOSE QUALITATIVE U: NEGATIVE
GLUCOSE SERPL-MCNC: 134 MG/DL
HBA1C MFR BLD HPLC: 6.3 %
HCT VFR BLD CALC: 44.5 %
HDLC SERPL-MCNC: 79 MG/DL
HGB BLD-MCNC: 14.5 G/DL
IMM GRANULOCYTES NFR BLD AUTO: 0.5 %
KETONES URINE: NEGATIVE
LDLC SERPL CALC-MCNC: 105 MG/DL
LEUKOCYTE ESTERASE URINE: ABNORMAL
LYMPHOCYTES # BLD AUTO: 1.55 K/UL
LYMPHOCYTES NFR BLD AUTO: 25.2 %
MAN DIFF?: NORMAL
MCHC RBC-ENTMCNC: 31.1 PG
MCHC RBC-ENTMCNC: 32.6 GM/DL
MCV RBC AUTO: 95.5 FL
MICROALBUMIN 24H UR DL<=1MG/L-MCNC: <1.2 MG/DL
MICROALBUMIN/CREAT 24H UR-RTO: NORMAL MG/G
MONOCYTES # BLD AUTO: 0.69 K/UL
MONOCYTES NFR BLD AUTO: 11.2 %
NEUTROPHILS # BLD AUTO: 3.63 K/UL
NEUTROPHILS NFR BLD AUTO: 59 %
NITRITE URINE: NEGATIVE
PH URINE: 6
PLATELET # BLD AUTO: 338 K/UL
POTASSIUM SERPL-SCNC: 4.3 MMOL/L
PROT SERPL-MCNC: 7.3 G/DL
PROTEIN URINE: NEGATIVE
RBC # BLD: 4.66 M/UL
RBC # FLD: 12.2 %
SARS-COV-2 IGG SERPL IA-ACNC: <0.1 INDEX
SARS-COV-2 IGG SERPL QL IA: NEGATIVE
SODIUM SERPL-SCNC: 141 MMOL/L
SPECIFIC GRAVITY URINE: 1.01
TRIGL SERPL-MCNC: 102 MG/DL
TSH SERPL-ACNC: 2.32 UIU/ML
UROBILINOGEN URINE: NORMAL
VIT B12 SERPL-MCNC: 1210 PG/ML
WBC # FLD AUTO: 6.15 K/UL

## 2020-08-28 ENCOUNTER — APPOINTMENT (OUTPATIENT)
Dept: GASTROENTEROLOGY | Facility: CLINIC | Age: 65
End: 2020-08-28
Payer: MEDICAID

## 2020-08-28 VITALS
SYSTOLIC BLOOD PRESSURE: 140 MMHG | HEART RATE: 82 BPM | DIASTOLIC BLOOD PRESSURE: 80 MMHG | BODY MASS INDEX: 27.7 KG/M2 | WEIGHT: 187 LBS | TEMPERATURE: 98.3 F | HEIGHT: 69 IN | OXYGEN SATURATION: 98 %

## 2020-08-28 PROCEDURE — 99202 OFFICE O/P NEW SF 15 MIN: CPT

## 2020-08-28 RX ORDER — CYCLOBENZAPRINE HYDROCHLORIDE 5 MG/1
5 TABLET, FILM COATED ORAL 3 TIMES DAILY
Qty: 60 | Refills: 0 | Status: DISCONTINUED | COMMUNITY
Start: 2019-10-28 | End: 2020-08-28

## 2020-08-28 NOTE — HISTORY OF PRESENT ILLNESS
[de-identified] : Dr. Kramer takes care of this very pleasant 65-year-old female\par \par She has been having several months of a vague sense of dysphasia to both solids and liquids in the posterior pharynx and upper esophagus fairly consistently\par \par No nausea vomiting\par \par No pain\par She was treated for pharyngitis with cephalexin and prednisone not too long ago\par \par There is no weight loss\par \par No heartburn or indigestion\par \par She had colonoscopy less than 2 years ago without significant pathology, or polyp removed she thinks and told to come back in 3 years\par \par No family history of esophageal, stomach, colon or rectal cancer or hepatobiliary cancer\par \par

## 2020-08-28 NOTE — REASON FOR VISIT
[FreeTextEntry1] : Vague sense of dysphasia, fullness in the very posterior pharynx or upper esophagus [Initial Evaluation] : an initial evaluation

## 2020-08-28 NOTE — ASSESSMENT
[FreeTextEntry1] : Impression,\par \par Vague sense of dysphasia  to both solids and liquids fairly consistently for several months in the posterior pharynx and upper esophagus area with a sensation of fullness\par \par No regurgitation, nausea or vomiting or heartburn\par \par Colonoscopy done within the last couple of years, and she was told to follow-up in 3 years\par \par Suggest,\par \par Upper endoscopy with possible biopsies and dilation\par \par COVID testing several days prior to procedure\par \par Risks/benefits:\par The procedure, the risks and benefits and alternatives have been reviewed in great detail with the patient.  Risks including, but not limited to sedation such as cardiac and pulmonary compromise, the procedure itself such as bleeding requiring hospitalization, transfusion, surgery, temporary or permanent colostomy.  Perforation or puncture of the requiring hospitalization, surgery, temporary colostomy.\par \par The patient expresses understanding of the procedure and consents to undergoing the procedure.\par \par

## 2020-08-28 NOTE — PHYSICAL EXAM
[General Appearance - Alert] : alert [General Appearance - In No Acute Distress] : in no acute distress [General Appearance - Well Nourished] : well nourished [General Appearance - Well Developed] : well developed [General Appearance - Well-Appearing] : healthy appearing [FreeTextEntry1] : Healthy heavyset female, no acute distress, alert and oriented x3 [Sclera] : the sclera and conjunctiva were normal [Neck Appearance] : the appearance of the neck was normal [Neck Cervical Mass (___cm)] : no neck mass was observed [Jugular Venous Distention Increased] : there was no jugular-venous distention [Apical Impulse] : the apical impulse was normal [Auscultation Breath Sounds / Voice Sounds] : lungs were clear to auscultation bilaterally [Full Pulse] : the pedal pulses are present [Edema] : there was no peripheral edema [Bowel Sounds] : normal bowel sounds [Abdomen Soft] : soft [Abdomen Tenderness] : non-tender [Abdomen Mass (___ Cm)] : no abdominal mass palpated [Cervical Lymph Nodes Enlarged Anterior Bilaterally] : anterior cervical [Cervical Lymph Nodes Enlarged Posterior Bilaterally] : posterior cervical [Supraclavicular Lymph Nodes Enlarged Bilaterally] : supraclavicular [Axillary Lymph Nodes Enlarged Bilaterally] : axillary [Femoral Lymph Nodes Enlarged Bilaterally] : femoral [Inguinal Lymph Nodes Enlarged Bilaterally] : inguinal [No CVA Tenderness] : no ~M costovertebral angle tenderness [No Spinal Tenderness] : no spinal tenderness [Abnormal Walk] : normal gait [Nail Clubbing] : no clubbing  or cyanosis of the fingernails [Musculoskeletal - Swelling] : no joint swelling seen [Motor Tone] : muscle strength and tone were normal [Skin Color & Pigmentation] : normal skin color and pigmentation [Skin Turgor] : normal skin turgor [] : no rash [No Focal Deficits] : no focal deficits [Oriented To Time, Place, And Person] : oriented to person, place, and time [Impaired Insight] : insight and judgment were intact [Affect] : the affect was normal

## 2020-09-02 LAB — SARS-COV-2 N GENE NPH QL NAA+PROBE: NOT DETECTED

## 2020-09-03 ENCOUNTER — APPOINTMENT (OUTPATIENT)
Dept: GASTROENTEROLOGY | Facility: AMBULATORY MEDICAL SERVICES | Age: 65
End: 2020-09-03
Payer: MEDICAID

## 2020-09-03 ENCOUNTER — APPOINTMENT (OUTPATIENT)
Dept: CARDIOLOGY | Facility: CLINIC | Age: 65
End: 2020-09-03

## 2020-09-03 DIAGNOSIS — K29.60 OTHER GASTRITIS W/OUT BLEEDING: ICD-10-CM

## 2020-09-03 PROCEDURE — 43239 EGD BIOPSY SINGLE/MULTIPLE: CPT

## 2020-09-04 ENCOUNTER — APPOINTMENT (OUTPATIENT)
Dept: CARDIOLOGY | Facility: CLINIC | Age: 65
End: 2020-09-04

## 2020-09-09 ENCOUNTER — APPOINTMENT (OUTPATIENT)
Dept: ORTHOPEDIC SURGERY | Facility: CLINIC | Age: 65
End: 2020-09-09
Payer: MEDICAID

## 2020-09-09 PROCEDURE — 99214 OFFICE O/P EST MOD 30 MIN: CPT

## 2020-09-14 ENCOUNTER — LABORATORY RESULT (OUTPATIENT)
Age: 65
End: 2020-09-14

## 2020-09-14 ENCOUNTER — APPOINTMENT (OUTPATIENT)
Dept: SURGERY | Facility: CLINIC | Age: 65
End: 2020-09-14
Payer: MEDICAID

## 2020-09-14 DIAGNOSIS — Z86.39 PERSONAL HISTORY OF OTHER ENDOCRINE, NUTRITIONAL AND METABOLIC DISEASE: ICD-10-CM

## 2020-09-14 PROCEDURE — 99214 OFFICE O/P EST MOD 30 MIN: CPT

## 2020-09-14 PROCEDURE — 10005 FNA BX W/US GDN 1ST LES: CPT

## 2020-09-14 NOTE — PHYSICAL EXAM
[Midline] : located in midline position [Normal] : orientation to person, place, and time: normal [de-identified] : Extremities: FRAZIER x 4.   Skin: No obvious skin lesions.   Voice: clear

## 2020-09-15 ENCOUNTER — RX RENEWAL (OUTPATIENT)
Age: 65
End: 2020-09-15

## 2020-09-15 LAB
CA-I SERPL-SCNC: 1.27 MMOL/L
CALCIUM SERPL-MCNC: 10.2 MG/DL
CALCIUM SERPL-MCNC: 10.2 MG/DL
PARATHYROID HORMONE INTACT: 45 PG/ML

## 2020-09-18 ENCOUNTER — APPOINTMENT (OUTPATIENT)
Dept: CARDIOLOGY | Facility: CLINIC | Age: 65
End: 2020-09-18
Payer: MEDICAID

## 2020-09-18 ENCOUNTER — NON-APPOINTMENT (OUTPATIENT)
Age: 65
End: 2020-09-18

## 2020-09-18 VITALS
HEART RATE: 93 BPM | BODY MASS INDEX: 27.7 KG/M2 | SYSTOLIC BLOOD PRESSURE: 136 MMHG | RESPIRATION RATE: 15 BRPM | HEIGHT: 69 IN | WEIGHT: 187 LBS | DIASTOLIC BLOOD PRESSURE: 81 MMHG | TEMPERATURE: 98.5 F | OXYGEN SATURATION: 97 %

## 2020-09-18 DIAGNOSIS — I51.7 CARDIOMEGALY: ICD-10-CM

## 2020-09-18 PROCEDURE — 93880 EXTRACRANIAL BILAT STUDY: CPT

## 2020-09-18 PROCEDURE — 93306 TTE W/DOPPLER COMPLETE: CPT

## 2020-09-18 PROCEDURE — 93000 ELECTROCARDIOGRAM COMPLETE: CPT

## 2020-09-18 PROCEDURE — 99204 OFFICE O/P NEW MOD 45 MIN: CPT

## 2020-09-18 NOTE — PHYSICAL EXAM
[General Appearance - Well Developed] : well developed [Normal Appearance] : normal appearance [General Appearance - Well Nourished] : well nourished [Heart Rate And Rhythm] : heart rate and rhythm were normal [Heart Sounds] : normal S1 and S2 [Edema] : no peripheral edema present [] : no respiratory distress [Respiration, Rhythm And Depth] : normal respiratory rhythm and effort [Exaggerated Use Of Accessory Muscles For Inspiration] : no accessory muscle use [Auscultation Breath Sounds / Voice Sounds] : lungs were clear to auscultation bilaterally [Bowel Sounds] : normal bowel sounds [Abdomen Soft] : soft [Abdomen Tenderness] : non-tender [Skin Turgor] : normal skin turgor [Oriented To Time, Place, And Person] : oriented to person, place, and time [Impaired Insight] : insight and judgment were intact [Affect] : the affect was normal [Mood] : the mood was normal [FreeTextEntry1] : slow gait, uses cane

## 2020-09-18 NOTE — DISCUSSION/SUMMARY
[FreeTextEntry1] : 65F with HLD, presents to establish cardiovascular care\par \par 1. murmur\par -now c/o worsening sob on exertion over the past few months\par -will check echo \par \par 2. carotid plaque seen on thyroid u/s\par -will check carotid duplex\par -cont statin\par \par 3. ENCINAS\par -pt with multiple risk factors for CAD, age, HLD, TOB hx. \par -EKG today showing anterolaterally TWI \par -will check nuc pharm stress test\par \par f/u after testing completed

## 2020-09-18 NOTE — HISTORY OF PRESENT ILLNESS
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 pt states when she ambulates up the steps in her house carrying her dog she also get "winded". pt also endorses chest pressure in the center of her chest over the past two weeks. states it occurs mostly when she is resting, when she wakes up in the morning. pt also endorses L arm pain on occasion but is not sure the circumstances to when the L arm occurs. pt states all these symptoms initially started about a year ago, and symptoms have been getting progressively worse. \par \par pt states she recently had a thyroid biopsy and on the ultrasound for the biopsy, she was told she has some carotid plaque. \par \par recent labs 8/14/2020: tot chol 205 tg 102 hdl 79 ldl 105 Cr 0.58 a1c 6.3\par \par Med hx: HLD, "heart murmur", chronic back pain, thyroid nodules\par OBGYN hx: does not have children. Currently post menopause\par Sx hx: back sx, hand sx, cataract, hip replacement, removal of benign parotid gland tumor.\par Fam hx: M: breast CA, F: PPM\par Social hx: lives in [place], with twin sister, retired . quit tob 3/2020 (30 pk yr hx), drinks wine a couple times a week. denies drug use. \par Meds: lipitor 80 protonix\par Allergies: neosporin, bacitracin (rash). \par

## 2020-09-18 NOTE — REVIEW OF SYSTEMS
[Recent Weight Gain (___ Lbs)] : recent [unfilled] ~Ulb weight gain [Sore Throat] : sore throat [see HPI] : see HPI [Shortness Of Breath] : shortness of breath [Dyspnea on exertion] : dyspnea during exertion [Chest  Pressure] : chest pressure [Fever] : no fever [Chills] : no chills [Recent Weight Loss (___ Lbs)] : no recent weight loss [Lower Ext Edema] : no extremity edema [Palpitations] : no palpitations [Nausea] : no nausea [Vomiting] : no vomiting [Dizziness] : no dizziness [Confusion] : no confusion was observed [Excessive Thirst] : no polydipsia [Easy Bleeding] : no tendency for easy bleeding [Easy Bruising] : no tendency for easy bruising

## 2020-09-21 ENCOUNTER — RX RENEWAL (OUTPATIENT)
Age: 65
End: 2020-09-21

## 2020-09-24 ENCOUNTER — OUTPATIENT (OUTPATIENT)
Dept: OUTPATIENT SERVICES | Facility: HOSPITAL | Age: 65
LOS: 1 days | End: 2020-09-24

## 2020-09-24 ENCOUNTER — APPOINTMENT (OUTPATIENT)
Dept: ULTRASOUND IMAGING | Facility: CLINIC | Age: 65
End: 2020-09-24

## 2020-09-24 DIAGNOSIS — Z98.41 CATARACT EXTRACTION STATUS, RIGHT EYE: Chronic | ICD-10-CM

## 2020-09-24 DIAGNOSIS — Z98.890 OTHER SPECIFIED POSTPROCEDURAL STATES: Chronic | ICD-10-CM

## 2020-09-24 DIAGNOSIS — E04.2 NONTOXIC MULTINODULAR GOITER: ICD-10-CM

## 2020-09-24 DIAGNOSIS — Z96.642 PRESENCE OF LEFT ARTIFICIAL HIP JOINT: Chronic | ICD-10-CM

## 2020-09-24 DIAGNOSIS — D11.0 BENIGN NEOPLASM OF PAROTID GLAND: Chronic | ICD-10-CM

## 2020-10-05 ENCOUNTER — APPOINTMENT (OUTPATIENT)
Dept: RADIOLOGY | Facility: HOSPITAL | Age: 65
End: 2020-10-05
Payer: MEDICAID

## 2020-10-05 ENCOUNTER — OUTPATIENT (OUTPATIENT)
Dept: OUTPATIENT SERVICES | Facility: HOSPITAL | Age: 65
LOS: 1 days | End: 2020-10-05

## 2020-10-05 ENCOUNTER — APPOINTMENT (OUTPATIENT)
Dept: SPEECH THERAPY | Facility: HOSPITAL | Age: 65
End: 2020-10-05
Payer: MEDICAID

## 2020-10-05 ENCOUNTER — OUTPATIENT (OUTPATIENT)
Dept: OUTPATIENT SERVICES | Facility: HOSPITAL | Age: 65
LOS: 1 days | Discharge: HOME | End: 2020-10-05

## 2020-10-05 DIAGNOSIS — Z96.642 PRESENCE OF LEFT ARTIFICIAL HIP JOINT: Chronic | ICD-10-CM

## 2020-10-05 DIAGNOSIS — Z98.890 OTHER SPECIFIED POSTPROCEDURAL STATES: Chronic | ICD-10-CM

## 2020-10-05 DIAGNOSIS — Z98.41 CATARACT EXTRACTION STATUS, RIGHT EYE: Chronic | ICD-10-CM

## 2020-10-05 DIAGNOSIS — D11.0 BENIGN NEOPLASM OF PAROTID GLAND: Chronic | ICD-10-CM

## 2020-10-05 DIAGNOSIS — R13.10 DYSPHAGIA, UNSPECIFIED: ICD-10-CM

## 2020-10-05 PROCEDURE — 74230 X-RAY XM SWLNG FUNCJ C+: CPT | Mod: 26

## 2020-10-08 ENCOUNTER — OUTPATIENT (OUTPATIENT)
Dept: OUTPATIENT SERVICES | Facility: HOSPITAL | Age: 65
LOS: 1 days | End: 2020-10-08
Payer: MEDICAID

## 2020-10-08 ENCOUNTER — APPOINTMENT (OUTPATIENT)
Dept: ULTRASOUND IMAGING | Facility: IMAGING CENTER | Age: 65
End: 2020-10-08
Payer: MEDICAID

## 2020-10-08 ENCOUNTER — RESULT REVIEW (OUTPATIENT)
Age: 65
End: 2020-10-08

## 2020-10-08 DIAGNOSIS — Z98.890 OTHER SPECIFIED POSTPROCEDURAL STATES: Chronic | ICD-10-CM

## 2020-10-08 DIAGNOSIS — E04.2 NONTOXIC MULTINODULAR GOITER: ICD-10-CM

## 2020-10-08 DIAGNOSIS — Z98.41 CATARACT EXTRACTION STATUS, RIGHT EYE: Chronic | ICD-10-CM

## 2020-10-08 DIAGNOSIS — D11.0 BENIGN NEOPLASM OF PAROTID GLAND: Chronic | ICD-10-CM

## 2020-10-08 DIAGNOSIS — Z96.642 PRESENCE OF LEFT ARTIFICIAL HIP JOINT: Chronic | ICD-10-CM

## 2020-10-08 PROCEDURE — 76536 US EXAM OF HEAD AND NECK: CPT | Mod: 26

## 2020-10-08 PROCEDURE — 76536 US EXAM OF HEAD AND NECK: CPT

## 2020-10-15 DIAGNOSIS — R13.10 DYSPHAGIA, UNSPECIFIED: ICD-10-CM

## 2020-10-16 ENCOUNTER — APPOINTMENT (OUTPATIENT)
Dept: SURGERY | Facility: CLINIC | Age: 65
End: 2020-10-16
Payer: MEDICAID

## 2020-10-16 PROCEDURE — 99441: CPT

## 2020-10-22 ENCOUNTER — APPOINTMENT (OUTPATIENT)
Dept: CV DIAGNOSTICS | Facility: HOSPITAL | Age: 65
End: 2020-10-22

## 2020-10-23 ENCOUNTER — APPOINTMENT (OUTPATIENT)
Dept: ORTHOPEDIC SURGERY | Facility: CLINIC | Age: 65
End: 2020-10-23
Payer: MEDICAID

## 2020-10-23 ENCOUNTER — OUTPATIENT (OUTPATIENT)
Dept: OUTPATIENT SERVICES | Facility: HOSPITAL | Age: 65
LOS: 1 days | End: 2020-10-23
Payer: MEDICAID

## 2020-10-23 ENCOUNTER — APPOINTMENT (OUTPATIENT)
Dept: ULTRASOUND IMAGING | Facility: CLINIC | Age: 65
End: 2020-10-23
Payer: MEDICAID

## 2020-10-23 VITALS
HEART RATE: 75 BPM | HEIGHT: 69 IN | WEIGHT: 187 LBS | BODY MASS INDEX: 27.7 KG/M2 | SYSTOLIC BLOOD PRESSURE: 137 MMHG | DIASTOLIC BLOOD PRESSURE: 84 MMHG

## 2020-10-23 DIAGNOSIS — Z00.8 ENCOUNTER FOR OTHER GENERAL EXAMINATION: ICD-10-CM

## 2020-10-23 DIAGNOSIS — D11.0 BENIGN NEOPLASM OF PAROTID GLAND: Chronic | ICD-10-CM

## 2020-10-23 DIAGNOSIS — Z98.890 OTHER SPECIFIED POSTPROCEDURAL STATES: Chronic | ICD-10-CM

## 2020-10-23 DIAGNOSIS — Z96.642 PRESENCE OF LEFT ARTIFICIAL HIP JOINT: Chronic | ICD-10-CM

## 2020-10-23 DIAGNOSIS — Z98.41 CATARACT EXTRACTION STATUS, RIGHT EYE: Chronic | ICD-10-CM

## 2020-10-23 PROCEDURE — 73502 X-RAY EXAM HIP UNI 2-3 VIEWS: CPT | Mod: RT

## 2020-10-23 PROCEDURE — 93971 EXTREMITY STUDY: CPT | Mod: 26,RT

## 2020-10-23 PROCEDURE — 99213 OFFICE O/P EST LOW 20 MIN: CPT

## 2020-10-23 PROCEDURE — 99072 ADDL SUPL MATRL&STAF TM PHE: CPT

## 2020-10-23 PROCEDURE — 93971 EXTREMITY STUDY: CPT

## 2020-10-23 NOTE — DISCUSSION/SUMMARY
[de-identified] : S/P bilateral hip replacements, stable, pain likely stemming from her lumbar spine. \par She has been reassured that her implants remain stable. She has been recommended for routine home exercise to maintain strength and mobility. A prescription for a course of physical therapy was provided for strengthening. \par She will follow up annually, and has been recommended follow up for her spine with Dr. Mathur, and recommended to obtain a neurology consultation as per Dr. Mathur.

## 2020-10-23 NOTE — PHYSICAL EXAM
[de-identified] : On general examination the patient is adequately groomed and nourished. The vital parameters are as recorded. \par There is no lymphedema, bilateral LE varicose veins with swelling more pronounced right more than left LE, no skin warmth/erythema/scars/swelling, no ulcers and no palpable lymph nodes or masses in both lower extremities. Bilateral pedal pulses are well palpable.\par Upper Extremity:\par Both right and left upper extremities are unremarkable in terms of skin rash, lesions, pigmentation, redness, tenderness, swelling, joint instability, abnormal deformity or crepitus. The overall range of motion, sensation, motor tone and strength testing are normal.\par \par bilateral  hip: There is a well healed scar of surgery with no significant swelling, redness, or tenderness. The patient ambulates with a normal gait. Range of motion of the right hip: active SLR of 60 degrees and hip flexion to 100 degrees Abduction 40 degrees adduction 20 degrees external rotation 40 degrees internal rotation 20 degrees with hip abductor extensor power grade +4. Left hip ROM and strength is normal. \par \par Spine: There is no curvature of the spine or loss of normal lumbar lordosis. The skin is devoid of erythema, scars, pigmentation or rashes. There is no lumbar spasm and no tenderness especially at L4-L5 or L5-S1. \par The range of motion of the lumbar spine is normal and painless in flexion, extension, lateral flexion and rotation. There is no lumbar spine imbalance or instability detected.\par Bilateral passive SLR is 60 degrees in supine and sitting positions. Lasegue's Test is negative.\par Neurology:\par The patient is alert and oriented in person, place and time. The mood is calm and affect is normal.\par Testing for coordination including Rhomberg's Test and Finger-Nose Test, sensation, motor tone and power and deep tendon reflexes in both lower extremities is normal.\par \par \par  [de-identified] : The following radiographs were ordered and read by me during this patients visit. I reviewed each radiograph in detail with the patient and discussed the findings as highlighted below.\par AP and false profile Radiographs of the right hip and AP view of the pelvis taken today reveal a well fixed and aligned bilateral total hip replacement with no signs of mechanical failure or periprosthetic fracture.\par \par

## 2020-10-23 NOTE — HISTORY OF PRESENT ILLNESS
[Stable] : stable [4] : a current pain level of 4/10 [Intermit.] : ~He/She~ states the symptoms seem to be intermittent [de-identified] : Ms. CURT DICKENS is a 65 year old female presents s/p right total hip replacement 10/9/2019, presenting for a follow up. \par She presents with a five day history of right hip pain. She denies any fall or injury. She notes she has had radiating pain down the right leg over the last five days, along the anterior thigh, described as tender, with radiation down past the knee. She admits to intermittent weakness of the thigh, and notes the right leg gives way. She admits to numbness and tingling of the right LE as well. She notes overall her recovery has not been as easy compared to the recovery from her left total hip replacement. She notes she has been using the cane as she feels that her right leg is giving way and nervous she may fall. \par She was seen by Dr. Mathur and recommended for a neurology evaluation, but the patient notes she has not had this completed to date. \par

## 2020-10-28 ENCOUNTER — APPOINTMENT (OUTPATIENT)
Dept: CV DIAGNOSTICS | Facility: HOSPITAL | Age: 65
End: 2020-10-28

## 2020-10-28 ENCOUNTER — OUTPATIENT (OUTPATIENT)
Dept: OUTPATIENT SERVICES | Facility: HOSPITAL | Age: 65
LOS: 1 days | End: 2020-10-28
Payer: MEDICAID

## 2020-10-28 DIAGNOSIS — D11.0 BENIGN NEOPLASM OF PAROTID GLAND: Chronic | ICD-10-CM

## 2020-10-28 DIAGNOSIS — R01.1 CARDIAC MURMUR, UNSPECIFIED: ICD-10-CM

## 2020-10-28 DIAGNOSIS — Z96.642 PRESENCE OF LEFT ARTIFICIAL HIP JOINT: Chronic | ICD-10-CM

## 2020-10-28 DIAGNOSIS — Z98.41 CATARACT EXTRACTION STATUS, RIGHT EYE: Chronic | ICD-10-CM

## 2020-10-28 DIAGNOSIS — Z98.890 OTHER SPECIFIED POSTPROCEDURAL STATES: Chronic | ICD-10-CM

## 2020-10-28 PROCEDURE — 78452 HT MUSCLE IMAGE SPECT MULT: CPT

## 2020-10-28 PROCEDURE — 93017 CV STRESS TEST TRACING ONLY: CPT

## 2020-10-28 PROCEDURE — 78452 HT MUSCLE IMAGE SPECT MULT: CPT | Mod: 26

## 2020-10-28 PROCEDURE — 93018 CV STRESS TEST I&R ONLY: CPT

## 2020-10-28 PROCEDURE — A9500: CPT

## 2020-10-28 PROCEDURE — 93016 CV STRESS TEST SUPVJ ONLY: CPT

## 2020-11-02 ENCOUNTER — APPOINTMENT (OUTPATIENT)
Dept: ULTRASOUND IMAGING | Facility: CLINIC | Age: 65
End: 2020-11-02

## 2020-11-03 DIAGNOSIS — R94.39 ABNORMAL RESULT OF OTHER CARDIOVASCULAR FUNCTION STUDY: ICD-10-CM

## 2020-11-05 ENCOUNTER — OUTPATIENT (OUTPATIENT)
Dept: OUTPATIENT SERVICES | Facility: HOSPITAL | Age: 65
LOS: 1 days | End: 2020-11-05
Payer: MEDICAID

## 2020-11-05 ENCOUNTER — APPOINTMENT (OUTPATIENT)
Dept: MRI IMAGING | Facility: CLINIC | Age: 65
End: 2020-11-05
Payer: MEDICAID

## 2020-11-05 ENCOUNTER — RESULT REVIEW (OUTPATIENT)
Age: 65
End: 2020-11-05

## 2020-11-05 DIAGNOSIS — Z98.890 OTHER SPECIFIED POSTPROCEDURAL STATES: Chronic | ICD-10-CM

## 2020-11-05 DIAGNOSIS — Z00.8 ENCOUNTER FOR OTHER GENERAL EXAMINATION: ICD-10-CM

## 2020-11-05 DIAGNOSIS — D11.0 BENIGN NEOPLASM OF PAROTID GLAND: Chronic | ICD-10-CM

## 2020-11-05 DIAGNOSIS — Z98.41 CATARACT EXTRACTION STATUS, RIGHT EYE: Chronic | ICD-10-CM

## 2020-11-05 DIAGNOSIS — Z96.642 PRESENCE OF LEFT ARTIFICIAL HIP JOINT: Chronic | ICD-10-CM

## 2020-11-05 PROCEDURE — A9585: CPT

## 2020-11-05 PROCEDURE — 75561 CARDIAC MRI FOR MORPH W/DYE: CPT | Mod: 26

## 2020-11-05 PROCEDURE — 75561 CARDIAC MRI FOR MORPH W/DYE: CPT

## 2020-11-13 ENCOUNTER — OUTPATIENT (OUTPATIENT)
Dept: OUTPATIENT SERVICES | Facility: HOSPITAL | Age: 65
LOS: 1 days | End: 2020-11-13
Payer: MEDICAID

## 2020-11-13 VITALS
DIASTOLIC BLOOD PRESSURE: 83 MMHG | HEART RATE: 72 BPM | TEMPERATURE: 99 F | SYSTOLIC BLOOD PRESSURE: 183 MMHG | OXYGEN SATURATION: 97 % | RESPIRATION RATE: 16 BRPM | WEIGHT: 182.1 LBS | HEIGHT: 69 IN

## 2020-11-13 VITALS
SYSTOLIC BLOOD PRESSURE: 140 MMHG | HEART RATE: 75 BPM | RESPIRATION RATE: 18 BRPM | DIASTOLIC BLOOD PRESSURE: 75 MMHG | OXYGEN SATURATION: 98 %

## 2020-11-13 DIAGNOSIS — Z98.41 CATARACT EXTRACTION STATUS, RIGHT EYE: Chronic | ICD-10-CM

## 2020-11-13 DIAGNOSIS — Z96.642 PRESENCE OF LEFT ARTIFICIAL HIP JOINT: Chronic | ICD-10-CM

## 2020-11-13 DIAGNOSIS — R94.31 ABNORMAL ELECTROCARDIOGRAM [ECG] [EKG]: ICD-10-CM

## 2020-11-13 DIAGNOSIS — D11.0 BENIGN NEOPLASM OF PAROTID GLAND: Chronic | ICD-10-CM

## 2020-11-13 DIAGNOSIS — Z98.890 OTHER SPECIFIED POSTPROCEDURAL STATES: Chronic | ICD-10-CM

## 2020-11-13 DIAGNOSIS — Z96.641 PRESENCE OF RIGHT ARTIFICIAL HIP JOINT: Chronic | ICD-10-CM

## 2020-11-13 LAB
ANION GAP SERPL CALC-SCNC: 12 MMOL/L — SIGNIFICANT CHANGE UP (ref 5–17)
BUN SERPL-MCNC: 12 MG/DL — SIGNIFICANT CHANGE UP (ref 7–23)
CALCIUM SERPL-MCNC: 9.8 MG/DL — SIGNIFICANT CHANGE UP (ref 8.4–10.5)
CHLORIDE SERPL-SCNC: 104 MMOL/L — SIGNIFICANT CHANGE UP (ref 96–108)
CO2 SERPL-SCNC: 24 MMOL/L — SIGNIFICANT CHANGE UP (ref 22–31)
CREAT SERPL-MCNC: 0.57 MG/DL — SIGNIFICANT CHANGE UP (ref 0.5–1.3)
GLUCOSE SERPL-MCNC: 153 MG/DL — HIGH (ref 70–99)
HCT VFR BLD CALC: 43.2 % — SIGNIFICANT CHANGE UP (ref 34.5–45)
HGB BLD-MCNC: 14.6 G/DL — SIGNIFICANT CHANGE UP (ref 11.5–15.5)
MAGNESIUM SERPL-MCNC: 2.2 MG/DL — SIGNIFICANT CHANGE UP (ref 1.6–2.6)
MCHC RBC-ENTMCNC: 30.8 PG — SIGNIFICANT CHANGE UP (ref 27–34)
MCHC RBC-ENTMCNC: 33.8 GM/DL — SIGNIFICANT CHANGE UP (ref 32–36)
MCV RBC AUTO: 91.1 FL — SIGNIFICANT CHANGE UP (ref 80–100)
NRBC # BLD: 0 /100 WBCS — SIGNIFICANT CHANGE UP (ref 0–0)
PLATELET # BLD AUTO: 341 K/UL — SIGNIFICANT CHANGE UP (ref 150–400)
POTASSIUM SERPL-MCNC: 4.3 MMOL/L — SIGNIFICANT CHANGE UP (ref 3.5–5.3)
POTASSIUM SERPL-SCNC: 4.3 MMOL/L — SIGNIFICANT CHANGE UP (ref 3.5–5.3)
RBC # BLD: 4.74 M/UL — SIGNIFICANT CHANGE UP (ref 3.8–5.2)
RBC # FLD: 11.5 % — SIGNIFICANT CHANGE UP (ref 10.3–14.5)
SARS-COV-2 N GENE NPH QL NAA+PROBE: NOT DETECTED
SODIUM SERPL-SCNC: 140 MMOL/L — SIGNIFICANT CHANGE UP (ref 135–145)
WBC # BLD: 6.17 K/UL — SIGNIFICANT CHANGE UP (ref 3.8–10.5)
WBC # FLD AUTO: 6.17 K/UL — SIGNIFICANT CHANGE UP (ref 3.8–10.5)

## 2020-11-13 PROCEDURE — 99153 MOD SED SAME PHYS/QHP EA: CPT

## 2020-11-13 PROCEDURE — 93010 ELECTROCARDIOGRAM REPORT: CPT

## 2020-11-13 PROCEDURE — 80048 BASIC METABOLIC PNL TOTAL CA: CPT

## 2020-11-13 PROCEDURE — C1894: CPT

## 2020-11-13 PROCEDURE — C9600: CPT | Mod: RC

## 2020-11-13 PROCEDURE — C1874: CPT

## 2020-11-13 PROCEDURE — 93005 ELECTROCARDIOGRAM TRACING: CPT

## 2020-11-13 PROCEDURE — C1753: CPT

## 2020-11-13 PROCEDURE — C1725: CPT

## 2020-11-13 PROCEDURE — C1769: CPT

## 2020-11-13 PROCEDURE — 99152 MOD SED SAME PHYS/QHP 5/>YRS: CPT

## 2020-11-13 PROCEDURE — 93458 L HRT ARTERY/VENTRICLE ANGIO: CPT | Mod: 59

## 2020-11-13 PROCEDURE — 92978 ENDOLUMINL IVUS OCT C 1ST: CPT | Mod: RC

## 2020-11-13 PROCEDURE — 92978 ENDOLUMINL IVUS OCT C 1ST: CPT | Mod: 26,RC

## 2020-11-13 PROCEDURE — C1887: CPT

## 2020-11-13 PROCEDURE — 85027 COMPLETE CBC AUTOMATED: CPT

## 2020-11-13 PROCEDURE — 93458 L HRT ARTERY/VENTRICLE ANGIO: CPT | Mod: 26,59

## 2020-11-13 PROCEDURE — 92928 PRQ TCAT PLMT NTRAC ST 1 LES: CPT | Mod: RC

## 2020-11-13 PROCEDURE — 83735 ASSAY OF MAGNESIUM: CPT

## 2020-11-13 RX ORDER — CLOPIDOGREL BISULFATE 75 MG/1
1 TABLET, FILM COATED ORAL
Qty: 90 | Refills: 3
Start: 2020-11-13 | End: 2021-11-07

## 2020-11-13 RX ORDER — GABAPENTIN 400 MG/1
100 CAPSULE ORAL EVERY 8 HOURS
Refills: 0 | Status: DISCONTINUED | OUTPATIENT
Start: 2020-11-13 | End: 2020-11-27

## 2020-11-13 RX ORDER — ATORVASTATIN CALCIUM 80 MG/1
0 TABLET, FILM COATED ORAL
Qty: 0 | Refills: 0 | DISCHARGE

## 2020-11-13 RX ORDER — ASPIRIN/CALCIUM CARB/MAGNESIUM 324 MG
81 TABLET ORAL DAILY
Refills: 0 | Status: DISCONTINUED | OUTPATIENT
Start: 2020-11-13 | End: 2020-11-27

## 2020-11-13 RX ORDER — ATORVASTATIN CALCIUM 80 MG/1
80 TABLET, FILM COATED ORAL AT BEDTIME
Refills: 0 | Status: DISCONTINUED | OUTPATIENT
Start: 2020-11-13 | End: 2020-11-27

## 2020-11-13 RX ORDER — ASPIRIN/CALCIUM CARB/MAGNESIUM 324 MG
1 TABLET ORAL
Qty: 90 | Refills: 3
Start: 2020-11-13 | End: 2021-11-07

## 2020-11-13 RX ORDER — DICLOFENAC SODIUM 75 MG/1
1 TABLET, DELAYED RELEASE ORAL
Qty: 0 | Refills: 0 | DISCHARGE

## 2020-11-13 RX ORDER — CIMETIDINE 200 MG
1 TABLET ORAL
Qty: 0 | Refills: 0 | DISCHARGE

## 2020-11-13 RX ORDER — PANTOPRAZOLE SODIUM 20 MG/1
40 TABLET, DELAYED RELEASE ORAL
Refills: 0 | Status: DISCONTINUED | OUTPATIENT
Start: 2020-11-13 | End: 2020-11-27

## 2020-11-13 RX ORDER — ATORVASTATIN CALCIUM 80 MG/1
1 TABLET, FILM COATED ORAL
Qty: 0 | Refills: 0 | DISCHARGE

## 2020-11-13 RX ORDER — CLOPIDOGREL BISULFATE 75 MG/1
75 TABLET, FILM COATED ORAL DAILY
Refills: 0 | Status: DISCONTINUED | OUTPATIENT
Start: 2020-11-14 | End: 2020-11-27

## 2020-11-13 RX ORDER — ASPIRIN 325 MG
1 TABLET ORAL
Qty: 90 | Refills: 3 | DISCHARGE
Start: 2020-11-13 | End: 2021-11-07

## 2020-11-13 NOTE — ASU DISCHARGE PLAN (ADULT/PEDIATRIC) - CARE PROVIDER_API CALL
Ezio Montiel)  Internal Medicine  [address] [place] [address]  Phone: [phone]  Fax: ()-  Follow Up Time: 2 weeks

## 2020-11-13 NOTE — H&P CARDIOLOGY - PMH
Anxiety and depression  hx depression during yrs of menstruation/ stable now  Hyperlipidemia  on meds  Murmur  benign  Osteoarthrosis  hip replacement left 2014  Rosacea     Anxiety    Hyperlipidemia  on meds  Murmur  benign  Osteoarthrosis  hip replacement left 2014  Rosacea

## 2020-11-13 NOTE — H&P CARDIOLOGY - PSH
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

## 2020-11-13 NOTE — H&P CARDIOLOGY - HISTORY OF PRESENT ILLNESS
65 year old  female with hx of hyperlipidemia, smoker 0.5 PPD x 30 years, osteoarthritis with bilateral hip replacements who 65 year old  female with hx of anxiety, hyperlipidemia, smoker 0.5 PPD x 30 years, osteoarthritis with bilateral hip replacements who was complaining of dyspnea going up the stairs (14 steps in the house) and chest pressure like for several years and was evaluated by cardiologist (nimco) Ezio Frank to establish care.  Patient underwent nuclear stress test that was abnormal (see below) was referred for [facility] for ischemic evaluation.  Denied dizziness, diaphoresis, palpitations, nausea, vomiting, peripheral edema, recent weight gain, or syncope. Denies any implantable cardiac monitoring device.     < from: Nuclear Stress Test-Pharmacologic (Nuclear Stress Test-Pharmacologic .) (10.28.20 @ 10:06) >  IMPRESSIONS: Abnormal Study  * Chest Pain:No chest pain with administration of  Regadenoson.  * Symptom: Shortness of breath.  * HR Response: Appropriate.  * BP Response: Appropriate.  * Heart Rhythm: Sinus Rhythm - 67 BPM.  * Baseline ECG: T wave abnormality in leads I, aVL, and  V2-V6, inverted at baseline.  * ECG Changes: No significant ischemic ST segment changes  beyond baseline abnormalities.  * Arrhythmia: None.  * The left ventricle was normal in size.  * There are medium-sized, mild defects in the mid to  distal inferior and distal inferolateral walls that are  reversible suggestive of mild ischemia.  * There are medium-sized, mild defects in the distal  anteroseptal, distal anterior, and apical walls that are  reversible suggestive of mild ischemia.  * Post-stress gated wall motion analysis was performed  (LVEF > 70%;LVEDV = 80 ml.) revealing reduced systolic  thickening of the distal inferolateral wall with normal  left ventricular ejection fraction.  *** No previous Nuclear/Stress exam.  ------------------------------------------------------------------------  Confirmed on  10/28/2020 - 16:50:32 by Dax Paez M.D.  ------------------------------------------------------------------------    < end of copied text >

## 2020-11-13 NOTE — ASU DISCHARGE PLAN (ADULT/PEDIATRIC) - ASU DC SPECIAL INSTRUCTIONSFT
Wound Care:   The day AFTER your procedure remove bandage GENTLY, and clean using  mild soap and gentle warm, water stream, pat dry. leave OPEN to air. YOU MAY SHOWER   DO NOT apply lotions, creams, ointments, powder, perfumes to your incision site  DO NOT SOAK your site for 1 week ( no baths, no pools, no tubs, etc...)  Check  your groin and /or wrist daily. A small amount of bruising, and soreness are normal    ACTIVITY: for 24 hours   - DO NOT DRIVE  - DO NOT make any important decisions or sign legal documents   - DO NOT operate heavy machineries   - you may resume sexual activity in 48 hours, unless otherwise instructed by your cardiologist     If your procedure was done through the WRIST: for the NEXT 3DAYS:  - avoid pushing, pulling, with that affected wrist   - avoid repeated movement of that hand and wrist (eg: typing, hammering)  - DO NOT LIFT anything more than 5 lbs     If your procedure was done through the GROIN: for the NEXT 5 DAYS  - Limit climbing stairs, DO NOT soak in bathtub or pool  - no strenuous activities, pushing, pulling, straining  - Do not lift anything 10lbs or heavier     MEDICATION:   take your medications as explained (see discharge paperwork)   If you received a STENT, you will be taking antiplatelet medications to KEEP YOUR STENT OPEN (eg: Aspirin, Plavix, Brilinta, Effient, etc).  Take as prescribed DO NOT STOP taking them without consulting with your cardiologist first.     Follow heart healthy diet recommended by your doctor, , if you smoke STOP SMOKING (may call [phone] for center of tobacco control if you need assistance)     CALL your doctor to make appointment in 2 WEEKS     ***CALL YOUR DOCTOR***  if you experience: fever, chills, body aches, or severe pain, swelling, redness, heat or yellow discharge at incision site  If you experience Bleeding or excruciating pain at the procedural site, swelling ( golf ball size) at your procedural site  If you experience CHEST PAIN  If you experience extremity numbness, tingling, temperature change ( of your procedural site)   If you are unable to reach your doctor, you may contact:   -Cardiology Office at [facility] at [phone] or   - [facility] [phone]  - [facility] [phone]

## 2020-11-16 PROBLEM — F41.9 ANXIETY DISORDER, UNSPECIFIED: Chronic | Status: ACTIVE | Noted: 2020-11-13

## 2020-11-25 ENCOUNTER — NON-APPOINTMENT (OUTPATIENT)
Age: 65
End: 2020-11-25

## 2020-11-25 ENCOUNTER — APPOINTMENT (OUTPATIENT)
Dept: CARDIOLOGY | Facility: CLINIC | Age: 65
End: 2020-11-25
Payer: MEDICAID

## 2020-11-25 VITALS
WEIGHT: 187 LBS | HEART RATE: 77 BPM | RESPIRATION RATE: 15 BRPM | DIASTOLIC BLOOD PRESSURE: 76 MMHG | HEIGHT: 69 IN | SYSTOLIC BLOOD PRESSURE: 126 MMHG | BODY MASS INDEX: 27.7 KG/M2 | OXYGEN SATURATION: 97 % | TEMPERATURE: 98.4 F

## 2020-11-25 PROCEDURE — 99214 OFFICE O/P EST MOD 30 MIN: CPT

## 2020-11-25 PROCEDURE — 93000 ELECTROCARDIOGRAM COMPLETE: CPT

## 2020-11-25 NOTE — PHYSICAL EXAM
[General Appearance - Well Developed] : well developed [Normal Appearance] : normal appearance [General Appearance - Well Nourished] : well nourished [] : no respiratory distress [Respiration, Rhythm And Depth] : normal respiratory rhythm and effort [Exaggerated Use Of Accessory Muscles For Inspiration] : no accessory muscle use [Auscultation Breath Sounds / Voice Sounds] : lungs were clear to auscultation bilaterally [Heart Rate And Rhythm] : heart rate and rhythm were normal [Heart Sounds] : normal S1 and S2 [Edema] : no peripheral edema present [FreeTextEntry1] : 3/6 systolic blowing murmur at LUSB. 2+ radial pulses b/l [Bowel Sounds] : normal bowel sounds [Abdomen Soft] : soft [Abdomen Tenderness] : non-tender [Abnormal Walk] : normal gait [Skin Turgor] : normal skin turgor [Oriented To Time, Place, And Person] : oriented to person, place, and time [Impaired Insight] : insight and judgment were intact [Affect] : the affect was normal [Mood] : the mood was normal

## 2020-11-25 NOTE — HISTORY OF PRESENT ILLNESS
[FreeTextEntry1] : 65F with HLD, CAD/PCI presents for f/u\par PMD Dr Concetta Valdivia/Dr Lola Flores.\par \par pt last seen in cardiology for an initial visit on 9/18/2020. at that time pt c/o ENCINAS. echo done at that time revealed grossly normal LV systolic function, mild LVH, MAC and AV calcification with normal opening.  Carotid studies revealed b/l 16/49% stenosis. pt was sent for a nuclear stress test which revealed medium sized moderate defects in the distal inferior, inferolateral walls that were reversible. \par results were discussed with pt and given persistent symptoms pt was sent for cardiac cath where she Recieved a 3mm x 24mm JAE to a 90% RCA lesion. pt was sent home on dapt with asa/plavix\par pt now presents for f/u. \par \par pt states her symptoms have started to improve. pt states she is able to "run around" without having to stop and rest. pt denies CP. pt denies dizziness, syncope. pt denies LE edema or orthopnea. \par \par recent labs 8/14/2020: tot chol 205 tg 102 hdl 79 ldl 105 Cr 0.58 a1c 6.3\par \par Med hx: HLD, "heart murmur", chronic back pain, thyroid nodules\par OBGYN hx: does not have children. Currently post menopause\par Sx hx: back sx, hand sx, cataract, hip replacement, removal of benign parotid gland tumor.\par Fam hx: M: breast CA, F: PPM\par Social hx: lives in [place], with twin sister, retired . quit tob 3/2020 (30 pk yr hx), drinks wine a couple times a week. denies drug use. \par Meds: lipitor 80 protonix, asa, plavix, lopressor 12.5 bid. \par Allergies: neosporin, bacitracin (rash). \par \par

## 2020-11-25 NOTE — DISCUSSION/SUMMARY
[FreeTextEntry1] : 65F with CAD/PCI to RCA, HLD, presents for f/u\par \par 1. CAD\par -s/p PCI to RCA 11/2020\par -cont asa and plavix\par -cont lipitor 80\par -cont lopressor 12.5 bid, will consider switch to toprol at next visit. \par \par 2. carotid plaque \par -cont asa, statin\par -asymptomatic, will monitor\par \par \par \par f/u 4-5 months or sooner if needed.

## 2020-11-25 NOTE — REVIEW OF SYSTEMS
[Fever] : no fever [Recent Weight Gain (___ Lbs)] : recent [unfilled] ~Ulb weight gain [Chills] : no chills [Recent Weight Loss (___ Lbs)] : no recent weight loss [Sore Throat] : no sore throat [see HPI] : see HPI [Shortness Of Breath] : no shortness of breath [Dyspnea on exertion] : dyspnea during exertion [Chest  Pressure] : no chest pressure [Chest Pain] : no chest pain [Lower Ext Edema] : no extremity edema [Palpitations] : no palpitations [Cough] : no cough [Wheezing] : no wheezing [Nausea] : no nausea [Vomiting] : no vomiting [Dizziness] : no dizziness [Confusion] : no confusion was observed [Excessive Thirst] : no polydipsia [Easy Bleeding] : no tendency for easy bleeding [Easy Bruising] : no tendency for easy bruising

## 2020-12-01 ENCOUNTER — APPOINTMENT (OUTPATIENT)
Dept: ENDOCRINOLOGY | Facility: CLINIC | Age: 65
End: 2020-12-01
Payer: MEDICAID

## 2020-12-01 PROCEDURE — 97802 MEDICAL NUTRITION INDIV IN: CPT

## 2020-12-01 PROCEDURE — 99072 ADDL SUPL MATRL&STAF TM PHE: CPT

## 2020-12-12 ENCOUNTER — RX RENEWAL (OUTPATIENT)
Age: 65
End: 2020-12-12

## 2021-01-05 ENCOUNTER — NON-APPOINTMENT (OUTPATIENT)
Age: 66
End: 2021-01-05

## 2021-01-13 ENCOUNTER — NON-APPOINTMENT (OUTPATIENT)
Age: 66
End: 2021-01-13

## 2021-01-13 ENCOUNTER — APPOINTMENT (OUTPATIENT)
Dept: CARDIOLOGY | Facility: CLINIC | Age: 66
End: 2021-01-13
Payer: MEDICAID

## 2021-01-13 VITALS
DIASTOLIC BLOOD PRESSURE: 79 MMHG | TEMPERATURE: 98.3 F | BODY MASS INDEX: 27.85 KG/M2 | WEIGHT: 188 LBS | SYSTOLIC BLOOD PRESSURE: 156 MMHG | OXYGEN SATURATION: 96 % | HEART RATE: 78 BPM | HEIGHT: 69 IN

## 2021-01-13 PROCEDURE — 93000 ELECTROCARDIOGRAM COMPLETE: CPT

## 2021-01-13 PROCEDURE — 99072 ADDL SUPL MATRL&STAF TM PHE: CPT

## 2021-01-13 PROCEDURE — 99215 OFFICE O/P EST HI 40 MIN: CPT

## 2021-01-13 NOTE — DISCUSSION/SUMMARY
[FreeTextEntry1] : 65F with CAD/PCI to RCA, HLD, presents for f/u\par \par 1. CAD\par -s/p PCI to RCA 11/2020\par -cont asa and plavix\par -cont lipitor 80\par -cont lopressor 12.5 bid, will consider switch to toprol at next visit. \par \par 2. chest pain\par -atypical, only at night, not on exertion, may be stress related\par -no active symptoms now, EKG with TWI, likely residual changes, pt did have TWI prior to PCI\par -euvolemic on exam\par -d/w that if symptoms worsen or do not resolve, she will have to go to the ER. \par \par 2. carotid plaque \par -cont asa, statin\par -asymptomatic, will monitor\par \par \par pt to call in one week to discuss symptoms\par f/u 4-5 months or sooner if needed. \par

## 2021-01-13 NOTE — REVIEW OF SYSTEMS
[Fever] : no fever [Recent Weight Gain (___ Lbs)] : no recent weight gain [Chills] : no chills [Recent Weight Loss (___ Lbs)] : no recent weight loss [Blurry Vision] : no blurred vision [Sore Throat] : no sore throat [see HPI] : see HPI [Shortness Of Breath] : no shortness of breath [Dyspnea on exertion] : not dyspnea during exertion [Chest  Pressure] : no chest pressure [Chest Pain] : chest pain [Lower Ext Edema] : no extremity edema [Palpitations] : no palpitations [Cough] : no cough [Wheezing] : no wheezing [Nausea] : no nausea [Vomiting] : no vomiting [Dizziness] : no dizziness [Confusion] : no confusion was observed [Excessive Thirst] : no polydipsia [Easy Bleeding] : no tendency for easy bleeding [Easy Bruising] : no tendency for easy bruising

## 2021-01-13 NOTE — HISTORY OF PRESENT ILLNESS
[FreeTextEntry1] : 65F with HLD, CAD/PCI presents for f/u\par PMD Dr Concetta Valdivia/Dr Lola Flores.\par \par pt last seen in cardiology 11/25/2020 for f/u for CAD following a [facility] where she received a PCI to a 90% RCA lesion. at last appt pt was feeling significantly better than before her stent was placed, pt stated she was able to "run around" without having to stop and rest. \par \par previously, pt was initially seen in cardiology on 9/18/2020 w ENCINAS. echo done at that time revealed grossly normal LV systolic function, mild LVH, MAC and AV calcification with normal opening. Carotid studies revealed b/l 16-49% stenosis. pt was sent for a nuclear stress test which revealed medium sized moderate defects in the distal inferior, inferolateral walls that were reversible. and pt was sent for cardiac cath where she Recieved a 3mm x 24mm JAE to a 90% RCA lesion.\par \par pt now presents for f/u. \par today, pt states she is under significant stress at work, which is making her very upset all of the time.  and from covid in general. pt does endorse episodes of chest discomfort, only at night, not on on exertion. pt states it is when she is laying in bed, over the left chest, states its 8/10 and makes it difficult for her to fall asleep. pt states symptoms are different to previous symptoms when she required pci.  pt states full compliance with dapt. pt also states she has been drinking coffee right before bed, but has been thinking that maybe she should switch to decaf. pt is comfortable appearing today. \par \par \par CAD\par s/p PCI on asa, plavix\par \par  pt denies dizziness, syncope. pt denies LE edema or orthopnea. \par \par recent labs 8/14/2020: tot chol 205 tg 102 hdl 79 ldl 105 Cr 0.58 a1c 6.3\par \par Med hx: HLD, "heart murmur", chronic back pain, thyroid nodules\par OBGYN hx: does not have children. Currently post menopause\par Sx hx: back sx, hand sx, cataract, hip replacement, removal of benign parotid gland tumor.\par Fam hx: M: breast CA, F: PPM\par Social hx: lives in [place], with twin sister, retired . quit tob 3/2020 (30 pk yr hx), drinks wine a couple times a week. denies drug use. \par Meds: lipitor 80 protonix, asa, plavix, lopressor 12.5 bid. \par Allergies: neosporin, bacitracin (rash). \par \par \par

## 2021-01-16 ENCOUNTER — TRANSCRIPTION ENCOUNTER (OUTPATIENT)
Age: 66
End: 2021-01-16

## 2021-01-19 ENCOUNTER — APPOINTMENT (OUTPATIENT)
Dept: ENDOCRINOLOGY | Facility: CLINIC | Age: 66
End: 2021-01-19

## 2021-01-31 ENCOUNTER — RX RENEWAL (OUTPATIENT)
Age: 66
End: 2021-01-31

## 2021-02-02 ENCOUNTER — RX RENEWAL (OUTPATIENT)
Age: 66
End: 2021-02-02

## 2021-02-04 ENCOUNTER — APPOINTMENT (OUTPATIENT)
Dept: CARDIOLOGY | Facility: CLINIC | Age: 66
End: 2021-02-04
Payer: MEDICAID

## 2021-02-04 ENCOUNTER — NON-APPOINTMENT (OUTPATIENT)
Age: 66
End: 2021-02-04

## 2021-02-04 VITALS
HEART RATE: 72 BPM | SYSTOLIC BLOOD PRESSURE: 148 MMHG | RESPIRATION RATE: 15 BRPM | DIASTOLIC BLOOD PRESSURE: 83 MMHG | OXYGEN SATURATION: 98 % | BODY MASS INDEX: 27.85 KG/M2 | TEMPERATURE: 98.2 F | WEIGHT: 188 LBS | HEIGHT: 69 IN

## 2021-02-04 PROCEDURE — 99215 OFFICE O/P EST HI 40 MIN: CPT

## 2021-02-04 PROCEDURE — 93000 ELECTROCARDIOGRAM COMPLETE: CPT

## 2021-02-04 PROCEDURE — 99072 ADDL SUPL MATRL&STAF TM PHE: CPT

## 2021-02-04 NOTE — DISCUSSION/SUMMARY
[FreeTextEntry1] : 65F with CAD/PCI to RCA, HLD, presents for f/u\par \par 1. CAD\par -s/p PCI to RCA 11/2020\par -cont asa and plavix\par -cont lipitor 80\par -increase lopressor to 25 bid\par \par 2. chest pain\par -atypical, unclear etiology, may be stress related\par -ekg improved form prev\par -euvolemic on exam\par -pt comfortable appearing\par -pt refusing to go to ER does not want blood drawn today (trop)\par -lower suspicion for ACS here, may be 2/2 elevated BP, elevated LVEDP and wedge pressure. plan to uptitrate medication regimen, will increase Lopressor today, plan to add afterload reducing agents at next visit.\par -d/w that if symptoms worsen or do not resolve, she will have to go to the ER. \par -pt concerned about underlying lung disease, will refer to pulm, Dr Tea Zhu. \par -pt did states she was feeling better by the end of the encounter today.\par \par 2. carotid plaque \par -cont asa, statin\par -asymptomatic, will monitor\par \par \par pt to call in one week to discuss symptoms\par f/u 4-5 months or sooner if needed. \par

## 2021-02-04 NOTE — HISTORY OF PRESENT ILLNESS
[FreeTextEntry1] : 65F with HLD, CAD/PCI presents for f/u\par PMD Dr Concetta Valdivia/Dr Lola Flores.\par \par pt last seen in cardiology 1/13/21, at that time was c/o intermittent episodes of chest pain while laying in her bed at night. symptoms were diff than perv symptoms where she required pci. pt also with significant stress and anxiety in her life. \par \par previously, pt initially with ENCINAS 9/2020,  echo done at that time revealed grossly normal LV systolic function, mild LVH, MAC and AV calcification with normal opening. Carotid studies revealed b/l 16-49% stenosis. pt was sent for a nuclear stress test which revealed medium sized moderate defects in the distal inferior, inferolateral walls that were reversible. and pt was sent for cardiac cath (11/2020)  where she Recieved a 3mm x 24mm JAE to a 90% RCA lesion.\par \par pt calls today with worsening CP, refusing to go to the hospital, now presents in office for f/u.\par pt states constant cp, states it is a pressure, but different than when she needed a stent. pt is comfortable appearing, non-diaphoretic. pt states pain is still 8/10. unchanged by exertion. pt states she is under significant stress, her dog just passed away last week. pt without respiratory distress. \par pt requesting evaluation for possible lung disease. \par \par \par CAD\par s/p PCI on asa, plavix\par \par  pt denies dizziness, syncope. pt denies LE edema or orthopnea. \par \par recent labs 8/14/2020: tot chol 205 tg 102 hdl 79 ldl 105 Cr 0.58 a1c 6.3\par \par Med hx: HLD, "heart murmur", chronic back pain, thyroid nodules\par OBGYN hx: does not have children. Currently post menopause\par Sx hx: back sx, hand sx, cataract, hip replacement, removal of benign parotid gland tumor.\par Fam hx: M: breast CA, F: PPM\par Social hx: lives in [place], with twin sister, retired . quit tob 3/2020 (30 pk yr hx), drinks wine a couple times a week. denies drug use. \par Meds: lipitor 80 protonix, asa, plavix, lopressor 12.5 bid. \par Allergies: neosporin, bacitracin (rash). \par \par

## 2021-02-04 NOTE — REVIEW OF SYSTEMS
[Fever] : no fever [Recent Weight Gain (___ Lbs)] : no recent weight gain [Chills] : no chills [Recent Weight Loss (___ Lbs)] : no recent weight loss [Blurry Vision] : no blurred vision [Sore Throat] : no sore throat [see HPI] : see HPI [Shortness Of Breath] : no shortness of breath [Dyspnea on exertion] : not dyspnea during exertion [Chest  Pressure] : no chest pressure [Chest Pain] : chest pain [Lower Ext Edema] : no extremity edema [Palpitations] : no palpitations [Cough] : no cough [Wheezing] : no wheezing [Nausea] : no nausea [Vomiting] : no vomiting [Joint Pain] : no joint pain [Dizziness] : no dizziness [Confusion] : no confusion was observed [Excessive Thirst] : no polydipsia [Easy Bleeding] : no tendency for easy bleeding [Easy Bruising] : no tendency for easy bruising

## 2021-02-04 NOTE — PHYSICAL EXAM
[General Appearance - Well Developed] : well developed [Normal Appearance] : normal appearance [General Appearance - Well Nourished] : well nourished [] : no respiratory distress [Respiration, Rhythm And Depth] : normal respiratory rhythm and effort [Exaggerated Use Of Accessory Muscles For Inspiration] : no accessory muscle use [Auscultation Breath Sounds / Voice Sounds] : lungs were clear to auscultation bilaterally [Heart Sounds] : normal S1 and S2 [Heart Rate And Rhythm] : heart rate and rhythm were normal [Edema] : no peripheral edema present [FreeTextEntry1] : 3/6 systolic blowing murmur at LUSB. 2+ radial pulses b/l [Bowel Sounds] : normal bowel sounds [Abdomen Soft] : soft [Abdomen Tenderness] : non-tender [Abnormal Walk] : normal gait [Skin Turgor] : normal skin turgor [Oriented To Time, Place, And Person] : oriented to person, place, and time [Impaired Insight] : insight and judgment were intact [Affect] : the affect was normal [Mood] : the mood was normal

## 2021-02-09 ENCOUNTER — APPOINTMENT (OUTPATIENT)
Dept: PULMONOLOGY | Facility: CLINIC | Age: 66
End: 2021-02-09
Payer: MEDICAID

## 2021-02-09 VITALS
OXYGEN SATURATION: 99 % | TEMPERATURE: 97.7 F | WEIGHT: 187.38 LBS | HEART RATE: 74 BPM | DIASTOLIC BLOOD PRESSURE: 84 MMHG | RESPIRATION RATE: 15 BRPM | SYSTOLIC BLOOD PRESSURE: 166 MMHG | HEIGHT: 69 IN | BODY MASS INDEX: 27.75 KG/M2

## 2021-02-09 PROCEDURE — 36415 COLL VENOUS BLD VENIPUNCTURE: CPT

## 2021-02-09 PROCEDURE — 99072 ADDL SUPL MATRL&STAF TM PHE: CPT

## 2021-02-09 PROCEDURE — 99205 OFFICE O/P NEW HI 60 MIN: CPT | Mod: 25

## 2021-02-10 ENCOUNTER — NON-APPOINTMENT (OUTPATIENT)
Age: 66
End: 2021-02-10

## 2021-02-10 LAB
BASOPHILS # BLD AUTO: 0.04 K/UL
BASOPHILS NFR BLD AUTO: 0.5 %
DEPRECATED D DIMER PPP IA-ACNC: 331 NG/ML DDU
EOSINOPHIL # BLD AUTO: 0.14 K/UL
EOSINOPHIL NFR BLD AUTO: 1.8 %
HCT VFR BLD CALC: 43.4 %
HGB BLD-MCNC: 14.4 G/DL
IMM GRANULOCYTES NFR BLD AUTO: 0.5 %
LYMPHOCYTES # BLD AUTO: 1.85 K/UL
LYMPHOCYTES NFR BLD AUTO: 23.4 %
MAN DIFF?: NORMAL
MCHC RBC-ENTMCNC: 31.2 PG
MCHC RBC-ENTMCNC: 33.2 GM/DL
MCV RBC AUTO: 93.9 FL
MONOCYTES # BLD AUTO: 0.88 K/UL
MONOCYTES NFR BLD AUTO: 11.2 %
NEUTROPHILS # BLD AUTO: 4.94 K/UL
NEUTROPHILS NFR BLD AUTO: 62.6 %
NT-PROBNP SERPL-MCNC: 85 PG/ML
PLATELET # BLD AUTO: 328 K/UL
RBC # BLD: 4.62 M/UL
RBC # FLD: 12.3 %
SARS-COV-2 IGG SERPL IA-ACNC: 0.07 INDEX
SARS-COV-2 IGG SERPL QL IA: NEGATIVE
WBC # FLD AUTO: 7.89 K/UL

## 2021-02-10 NOTE — ASSESSMENT
[FreeTextEntry1] : Ms. CURT DICKENS is a 65 year old woman forme smoker with CAD (s/p JAE 11/2020) here for evaluation of chest pain. Her cardiac workup was unrevealing. \par Her sx could be related to poorly controlled reflux (prior EGD with e/o gastritis ) - will increase protonix to 40 mg bid for now\par Will obtain PFTs given smpking hx\par Given CATRINA, will check LE dopplers.\par Depending on Ddimer, will consider CTA\par \par All questions answered. Patient in agreement with plan. \par Follow up in 4w or sooner if needed.\par

## 2021-02-10 NOTE — PHYSICAL EXAM
[No Acute Distress] : no acute distress [Normal Oropharynx] : normal oropharynx [Normal Appearance] : normal appearance [No Neck Mass] : no neck mass [Normal Rate/Rhythm] : normal rate/rhythm [Normal S1, S2] : normal s1, s2 [No Murmurs] : no murmurs [No Resp Distress] : no resp distress [Clear to Auscultation Bilaterally] : clear to auscultation bilaterally [No Abnormalities] : no abnormalities [Benign] : benign [Normal Gait] : normal gait [No Clubbing] : no clubbing [No Cyanosis] : no cyanosis [FROM] : FROM [1+ Pitting] : 1+ pitting [Normal Color/ Pigmentation] : normal color/ pigmentation [No Focal Deficits] : no focal deficits [Oriented x3] : oriented x3 [Normal Affect] : normal affect

## 2021-02-10 NOTE — HISTORY OF PRESENT ILLNESS
[Former] : former [>= 30 pack years] : >= 30 pack years [TextBox_4] : Ms. CURT DICKENS is a 65 year old woman forme smoker with CAD (s/p JAE 11/2020) here for evaluation of chest pain.  \par \par Curt initially presented with ENCINAS, was eventually found to have 90% and JAE was placed.\par Today, Curt reports persistent mid sternal chest pain which is different from pain she had since Dec. Cardiac workup was unrevealing. \par  Describes pressure in the middle of her chest. Pain initially occurred at night when patient would lay down but now is present throughout the day. She denies any any precipitating or alleviating factors, denies association with food. \par She notes some SOB when going up the stairs and feels tired. Denies wheezing, cough, pulmonary related hospitalizations, known hx of COPD. Currently is not on any inhalers. \par On ROS, denies fevers, chills, wt loss. Reports diarrhea over the last several months. \par \par PMH: HLD, "heart murmur", chronic back pain, thyroid nodules\par Meds: lipitor 80 protonix, asa, plavix, lopressor 12.5 bid.\par All: Neosporin, Bacitracin\par SH: lives in [place], with twin sister, retired . quit tob 3/2020 (30 pk yr hx), drinks wine a couple times a week. denies drug use.\par FH  M: breast CA, F: PPM\par PMD: ELLA GIANG\par Immunizations: will need pneumovax after age 65\par  [TextBox_11] : 1 [TextBox_13] : 30 [YearQuit] : 3/2020

## 2021-02-11 LAB
ALBUMIN SERPL ELPH-MCNC: 4.7 G/DL
ALP BLD-CCNC: 172 U/L
ALT SERPL-CCNC: 125 U/L
ANION GAP SERPL CALC-SCNC: 20 MMOL/L
AST SERPL-CCNC: 57 U/L
BILIRUB SERPL-MCNC: 0.4 MG/DL
BUN SERPL-MCNC: 15 MG/DL
CALCIUM SERPL-MCNC: 9.9 MG/DL
CHLORIDE SERPL-SCNC: 105 MMOL/L
CO2 SERPL-SCNC: 17 MMOL/L
CREAT SERPL-MCNC: 0.65 MG/DL
GLUCOSE SERPL-MCNC: 106 MG/DL
MAGNESIUM SERPL-MCNC: 2.3 MG/DL
POTASSIUM SERPL-SCNC: 4.2 MMOL/L
PROT SERPL-MCNC: 7.1 G/DL
SODIUM SERPL-SCNC: 142 MMOL/L

## 2021-02-22 ENCOUNTER — APPOINTMENT (OUTPATIENT)
Dept: ULTRASOUND IMAGING | Facility: CLINIC | Age: 66
End: 2021-02-22
Payer: MEDICAID

## 2021-02-22 ENCOUNTER — OUTPATIENT (OUTPATIENT)
Dept: OUTPATIENT SERVICES | Facility: HOSPITAL | Age: 66
LOS: 1 days | End: 2021-02-22
Payer: MEDICAID

## 2021-02-22 ENCOUNTER — RESULT REVIEW (OUTPATIENT)
Age: 66
End: 2021-02-22

## 2021-02-22 ENCOUNTER — APPOINTMENT (OUTPATIENT)
Dept: CT IMAGING | Facility: CLINIC | Age: 66
End: 2021-02-22
Payer: MEDICAID

## 2021-02-22 DIAGNOSIS — Z96.641 PRESENCE OF RIGHT ARTIFICIAL HIP JOINT: Chronic | ICD-10-CM

## 2021-02-22 DIAGNOSIS — Z00.8 ENCOUNTER FOR OTHER GENERAL EXAMINATION: ICD-10-CM

## 2021-02-22 DIAGNOSIS — Z98.890 OTHER SPECIFIED POSTPROCEDURAL STATES: Chronic | ICD-10-CM

## 2021-02-22 DIAGNOSIS — Z96.642 PRESENCE OF LEFT ARTIFICIAL HIP JOINT: Chronic | ICD-10-CM

## 2021-02-22 DIAGNOSIS — Z98.41 CATARACT EXTRACTION STATUS, RIGHT EYE: Chronic | ICD-10-CM

## 2021-02-22 DIAGNOSIS — D11.0 BENIGN NEOPLASM OF PAROTID GLAND: Chronic | ICD-10-CM

## 2021-02-22 PROCEDURE — 93970 EXTREMITY STUDY: CPT | Mod: 26

## 2021-02-22 PROCEDURE — 82565 ASSAY OF CREATININE: CPT

## 2021-02-22 PROCEDURE — 71275 CT ANGIOGRAPHY CHEST: CPT

## 2021-02-22 PROCEDURE — 71275 CT ANGIOGRAPHY CHEST: CPT | Mod: 26

## 2021-02-22 PROCEDURE — 93970 EXTREMITY STUDY: CPT

## 2021-03-08 ENCOUNTER — RX RENEWAL (OUTPATIENT)
Age: 66
End: 2021-03-08

## 2021-03-09 LAB — SARS-COV-2 N GENE NPH QL NAA+PROBE: NOT DETECTED

## 2021-03-11 ENCOUNTER — APPOINTMENT (OUTPATIENT)
Dept: PULMONOLOGY | Facility: CLINIC | Age: 66
End: 2021-03-11
Payer: MEDICAID

## 2021-03-11 VITALS
BODY MASS INDEX: 27.4 KG/M2 | SYSTOLIC BLOOD PRESSURE: 147 MMHG | WEIGHT: 185 LBS | OXYGEN SATURATION: 97 % | DIASTOLIC BLOOD PRESSURE: 85 MMHG | RESPIRATION RATE: 15 BRPM | HEIGHT: 69 IN | HEART RATE: 53 BPM

## 2021-03-11 PROCEDURE — 99072 ADDL SUPL MATRL&STAF TM PHE: CPT

## 2021-03-11 PROCEDURE — 94727 GAS DIL/WSHOT DETER LNG VOL: CPT

## 2021-03-11 PROCEDURE — 94729 DIFFUSING CAPACITY: CPT

## 2021-03-11 PROCEDURE — 94060 EVALUATION OF WHEEZING: CPT

## 2021-03-11 PROCEDURE — 99215 OFFICE O/P EST HI 40 MIN: CPT | Mod: 25

## 2021-03-12 NOTE — END OF VISIT
Returned call to [facility] and explained that post operative care is very important and that Dr. Maynard would like to see her. I offered to change the appointment a few days if that would help, however they verified that they would keep it the same and try the best to be at the appointment.    [Time Spent: ___ minutes] : I have spent [unfilled] minutes of time on the encounter.

## 2021-03-12 NOTE — ASSESSMENT
[FreeTextEntry1] : Ms. CURT DICKENS is a 65 year old woman former smoker with CAD (s/p JAE 11/2020) following up fro chest pain and SOB. Her cardiac workup was unrevealing. \par Her CTA did not show e/o PE or parenchymal lung disease. \par PFTs with mild restriction, decreased FEF 25-75\par Continue with Protonix bid - needs GI evaluation\par Can continue using Albuterol as needed. \par f/u with cardiology\par \par All questions answered. Patient in agreement with plan. \par Follow up in 5-6mo  or sooner if needed.\par \par

## 2021-03-12 NOTE — HISTORY OF PRESENT ILLNESS
[Former] : former [>= 30 pack years] : >= 30 pack years [TextBox_4] : Ms. CURT DICKENS is a 65 year old woman forme smoker with CAD (s/p JAE 11/2020) here for evaluation of chest pain.  \par \par Curt initially presented with ENCINAS, was eventually found to have 90% and JAE was placed.\par She had quite extensive pulmonary workup which included CTA  - no e/o PE, no parenchymal lung disease and PFTs which were only notable for decreased FEF 25-75 (35%) and mild restrictive defect. \par She continues to have SOB when going up the stairs and has persistent left sided chest discomfort. \par Has been using Protonix which may be helping. \par \par \par PMH: HLD, "heart murmur", chronic back pain, thyroid nodules\par Meds: lipitor 80 protonix, asa, plavix, lopressor 12.5 bid.\par All: Neosporin, Bacitracin\par SH: lives in [place], with twin sister, retired . quit tob 3/2020 (30 pk yr hx), drinks wine a couple times a week. denies drug use.\par FH  M: breast CA, F: PPM\par PMD: ELLA GIANG\par Immunizations: will need pneumovax after age 65\par  [TextBox_11] : 1 [TextBox_13] : 30 [YearQuit] : 3/2020

## 2021-03-15 ENCOUNTER — NON-APPOINTMENT (OUTPATIENT)
Age: 66
End: 2021-03-15

## 2021-03-16 ENCOUNTER — APPOINTMENT (OUTPATIENT)
Dept: CARDIOLOGY | Facility: CLINIC | Age: 66
End: 2021-03-16

## 2021-03-18 ENCOUNTER — APPOINTMENT (OUTPATIENT)
Dept: CARDIOLOGY | Facility: CLINIC | Age: 66
End: 2021-03-18
Payer: MEDICAID

## 2021-03-18 ENCOUNTER — NON-APPOINTMENT (OUTPATIENT)
Age: 66
End: 2021-03-18

## 2021-03-18 VITALS
BODY MASS INDEX: 28.14 KG/M2 | HEART RATE: 67 BPM | DIASTOLIC BLOOD PRESSURE: 84 MMHG | HEIGHT: 69 IN | OXYGEN SATURATION: 97 % | SYSTOLIC BLOOD PRESSURE: 172 MMHG | TEMPERATURE: 98.5 F | WEIGHT: 190 LBS

## 2021-03-18 PROCEDURE — 99072 ADDL SUPL MATRL&STAF TM PHE: CPT

## 2021-03-18 PROCEDURE — 93000 ELECTROCARDIOGRAM COMPLETE: CPT

## 2021-03-18 PROCEDURE — 99215 OFFICE O/P EST HI 40 MIN: CPT

## 2021-03-18 NOTE — DISCUSSION/SUMMARY
[FreeTextEntry1] : 65F with CAD/PCI to RCA, HLD, presents for f/u\par \par 1. CAD\par -s/p PCI to RCA 11/2020\par -cont asa and plavix\par -cont lipitor 80\par -increase lopressor to 25 bid\par -pt cont to be hypertensive, will add lisinopril 5 mg po qd\par \par 2. chest pain\par -atypical, unclear etiology, may be stress related\par -euvolemic on exam\par -pt comfortable appearing\par \par \par \par 2. carotid plaque \par -cont asa, statin\par -asymptomatic, will monitor\par \par \par \par f/u 4-5 months or sooner if needed. \par

## 2021-03-18 NOTE — HISTORY OF PRESENT ILLNESS
[FreeTextEntry1] : 65F with HLD, CAD/PCI presents for f/u\par PMD Dr Concetta Valdivia/Dr Lola Flores.\par pulm: Dr Tea Zhu\par \par pt last seen in cardiology 2/4/21. at that time pt with constant chest pressure, different in quality from when she required a stent, not associated with exertion, or SOB but 8/10 pain. pt with prev complaints of chest pain, mostly related to stress.  pt refused to go to the ER, and lopressor was increased to 25 bid. pt was also sent for pulm eval. CT chest without evidence of parenchymal lung dz, no PE, PFTs shopwing mild restrictive pattern and pt was placed on albuterol inhalers. \par \par previously, \par pt initially with ENCINAS 9/2020, echo done at that time revealed grossly normal LV systolic function, mild LVH, MAC and AV calcification with normal opening. Carotid studies revealed b/l 16-49% stenosis. pt was sent for a nuclear stress test which revealed medium sized moderate defects in the distal inferior, inferolateral walls that were reversible. and pt was sent for cardiac cath (11/2020) where she Recieved a 3mm x 24mm JAE to a 90% RCA lesion.  TTE did reveal increased RV wall thickness, pt had cMRI, which showed no evidence of myocarditis, cardiomyopathy, or ARVD\par \par \par pt now presents for f/u.\par today, pt still with persistent cp, although improved from prev symptoms are intermittent throughout the day and night. pre pt thought the were related to stress, still states it might be. pt also with heartburn on protonix.\par pt states some relief with albuterol inhaler, plans to start taking symbicort but now waiting insurance approval. \par pt has not yet started to exercise, states she is "always nervous". pt feels very down, stressed about work. states she is getting old. d/w pt that her tests have shown that her heart is strong and she can and should start to be more active.\par \par \par \par CAD\par s/p PCI on asa, plavix\par \par  pt denies dizziness, syncope. pt denies LE edema or orthopnea. \par \par recent labs 8/14/2020: tot chol 205 tg 102 hdl 79 ldl 105 Cr 0.58 a1c 6.3\par \par pt planning on getting the first dose of the covid vaccine next week.\par \par Med hx: HLD, "heart murmur", chronic back pain, thyroid nodules\par OBGYN hx: does not have children. Currently post menopause\par Sx hx: back sx, hand sx, cataract, hip replacement, removal of benign parotid gland tumor.\par Fam hx: M: breast CA, F: PPM\par Social hx: lives in [place], with twin sister, retired . quit tob 3/2020 (30 pk yr hx), drinks wine a couple times a week. denies drug use. \par Meds: lipitor 80 protonix bid, cimetidine, asa, plavix, lopressor 25 bid. albuterol inhaler.\par Allergies: neosporin, bacitracin (rash). \par

## 2021-03-18 NOTE — PHYSICAL EXAM
[General Appearance - Well Developed] : well developed [Normal Appearance] : normal appearance [General Appearance - Well Nourished] : well nourished [] : no respiratory distress [Respiration, Rhythm And Depth] : normal respiratory rhythm and effort [Exaggerated Use Of Accessory Muscles For Inspiration] : no accessory muscle use [Auscultation Breath Sounds / Voice Sounds] : lungs were clear to auscultation bilaterally [Heart Rate And Rhythm] : heart rate and rhythm were normal [Heart Sounds] : normal S1 and S2 [Edema] : no peripheral edema present [Bowel Sounds] : normal bowel sounds [Abdomen Soft] : soft [Abdomen Tenderness] : non-tender [Abnormal Walk] : normal gait [Skin Turgor] : normal skin turgor [Oriented To Time, Place, And Person] : oriented to person, place, and time [Impaired Insight] : insight and judgment were intact [Affect] : the affect was normal [Mood] : the mood was normal [Murmurs] : no murmurs present [FreeTextEntry1] : 2+ radial pulses b/l

## 2021-03-18 NOTE — REVIEW OF SYSTEMS
[see HPI] : see HPI [Chest Pain] : chest pain [Heartburn] : heartburn [Fever] : no fever [Recent Weight Gain (___ Lbs)] : no recent weight gain [Chills] : no chills [Recent Weight Loss (___ Lbs)] : no recent weight loss [Blurry Vision] : no blurred vision [Sore Throat] : no sore throat [Shortness Of Breath] : no shortness of breath [Dyspnea on exertion] : not dyspnea during exertion [Chest  Pressure] : no chest pressure [Lower Ext Edema] : no extremity edema [Palpitations] : no palpitations [Cough] : no cough [Wheezing] : no wheezing [Nausea] : no nausea [Vomiting] : no vomiting [Joint Pain] : no joint pain [Dizziness] : no dizziness [Confusion] : no confusion was observed [Excessive Thirst] : no polydipsia [Easy Bleeding] : no tendency for easy bleeding [Easy Bruising] : no tendency for easy bruising

## 2021-03-22 RX ORDER — BUDESONIDE AND FORMOTEROL FUMARATE DIHYDRATE 80; 4.5 UG/1; UG/1
80-4.5 AEROSOL RESPIRATORY (INHALATION) TWICE DAILY
Qty: 1 | Refills: 3 | Status: DISCONTINUED | COMMUNITY
Start: 2021-03-15 | End: 2021-03-22

## 2021-03-24 RX ORDER — FLUTICASONE PROPIONATE AND SALMETEROL 100; 50 UG/1; UG/1
100-50 POWDER RESPIRATORY (INHALATION)
Qty: 1 | Refills: 0 | Status: DISCONTINUED | COMMUNITY
Start: 2021-03-22 | End: 2021-03-24

## 2021-04-10 ENCOUNTER — NON-APPOINTMENT (OUTPATIENT)
Age: 66
End: 2021-04-10

## 2021-04-10 ENCOUNTER — RX RENEWAL (OUTPATIENT)
Age: 66
End: 2021-04-10

## 2021-04-14 ENCOUNTER — APPOINTMENT (OUTPATIENT)
Dept: ORTHOPEDIC SURGERY | Facility: CLINIC | Age: 66
End: 2021-04-14
Payer: MEDICAID

## 2021-04-14 DIAGNOSIS — M60.9 MYOSITIS, UNSPECIFIED: ICD-10-CM

## 2021-04-14 PROCEDURE — 20552 NJX 1/MLT TRIGGER POINT 1/2: CPT

## 2021-04-14 PROCEDURE — 99214 OFFICE O/P EST MOD 30 MIN: CPT | Mod: 25

## 2021-04-14 PROCEDURE — 99072 ADDL SUPL MATRL&STAF TM PHE: CPT

## 2021-04-23 ENCOUNTER — NON-APPOINTMENT (OUTPATIENT)
Age: 66
End: 2021-04-23

## 2021-04-26 ENCOUNTER — APPOINTMENT (OUTPATIENT)
Dept: GASTROENTEROLOGY | Facility: CLINIC | Age: 66
End: 2021-04-26
Payer: MEDICAID

## 2021-04-26 ENCOUNTER — LABORATORY RESULT (OUTPATIENT)
Age: 66
End: 2021-04-26

## 2021-04-26 VITALS
HEART RATE: 68 BPM | DIASTOLIC BLOOD PRESSURE: 68 MMHG | WEIGHT: 185 LBS | HEIGHT: 69 IN | SYSTOLIC BLOOD PRESSURE: 112 MMHG | BODY MASS INDEX: 27.4 KG/M2 | OXYGEN SATURATION: 98 % | TEMPERATURE: 96.9 F

## 2021-04-26 DIAGNOSIS — Z01.818 ENCOUNTER FOR OTHER PREPROCEDURAL EXAMINATION: ICD-10-CM

## 2021-04-26 DIAGNOSIS — K63.5 POLYP OF COLON: ICD-10-CM

## 2021-04-26 PROCEDURE — 99214 OFFICE O/P EST MOD 30 MIN: CPT

## 2021-04-26 PROCEDURE — 99072 ADDL SUPL MATRL&STAF TM PHE: CPT

## 2021-04-26 NOTE — REVIEW OF SYSTEMS
Follow up consult  Infectious Disease    Follow-up For:  Wound infection, left achilles  See original consult 11/21  SUBJECTIVE:   Reviewed interim history and findings of SAH, reviewed ID notes from [facility], is currently on maxipime and vanc. Culture from wound has grown MSSA. She has no fever or leukocytosis. She did require dialysis x 1 24 hour period(?CRRT).   ROS:    No untoward pain from left leg, breathing comfortably. No antibiotic side effects    Antibiotics     Start     Stop Route Frequency Ordered    11/28/17 1700  ceFAZolin (ANCEF) 1 gram in dextrose 5 % 50 mL IVPB (premix)      -- IV Every 12 hours (non-standard times) 11/28/17 1555          Pertinent Medications noted:    EXAM & DIAGNOSTICS REVIEWED:   Vitals:     Temp:  [96.1 °F (35.6 °C)-98 °F (36.7 °C)]   Temp: 97.5 °F (36.4 °C) (11/28/17 1509)  Pulse: 72 (11/28/17 1509)  Resp: 16 (11/28/17 1509)  BP: (!) 113/57 (11/28/17 1509)  SpO2: 97 % (11/28/17 1509)    Intake/Output Summary (Last 24 hours) at 11/28/17 1922  Last data filed at 11/28/17 1700   Gross per 24 hour   Intake               50 ml   Output                0 ml   Net               50 ml       General:  In NAD. Looks comfortable. A little forgetful but remembers me  Eyes:  Anicteric, PERRL, EOMI  ENT:  Mouth w/ pink MMM, no lesions/exudate,     dentition  Neck:    Lungs:    Heart:    Abd:    :  Chi draining brownish urine, small amount  Musc:  Joints without effusion, erythema, synovitis,   Skin:  Generally warm, dry, normal for color. No rashes  Wound: Left achilles wound has hypergranulation tissue and some hematoma. It is soft, but no expressible exudate. The wound photos demonstrate exposed tendon, but it is not evident on exam now. There is some dependent woody edema of the leg which is improved compared to last exam. There is mild congestive erythema(as opposed to bacterial cellulitis). The foot is warm  Neuro: AAOx3, speech clear, moves all extrems equally  Extrem: Mild  Edema,mild  dependent erythema left lower leg,no phlebitis synovitis  VAD:  RIJ trialysis catheter    Lines/Tubes/Drains:    General Labs reviewed:    Recent Labs  Lab 11/28/17  0537   WBC 9.00   RBC 3.34*   HGB 8.4*   HCT 26.3*      MCV 79*   MCH 25.1*   MCHC 31.9*       Recent Labs  Lab 11/28/17  0537   CALCIUM 8.6*      K 4.3   CO2 25      BUN 15   CREATININE 2.7*       Micro: MSSA from achilles wound culture from 11/26    Imaging Reviewed:    ASSESSMENT & PLAN      Patient Active Problem List   Diagnosis    Acute on chronic congestive heart failure    Essential hypertension    CKD (chronic kidney disease) stage 3, GFR 30-59 ml/min    Type 2 diabetes mellitus with stage 3 chronic kidney disease, without long-term current use of insulin    Coronary artery disease involving native coronary artery of native heart without angina pectoris    Acquired hypothyroidism    Pleural effusion    Hyperkalemia    Diabetic leg ulcer, Staph aureus infection    Traumatic subarachnoid hemorrhage    Segmental and subsegmental pulmonary emboli of RLL without acute cor pulmonale    Acute renal failure superimposed on stage 3 chronic kidney disease    Anemia in stage 3 chronic kidney disease    Subarachnoid hemorrhage following injury, no loss of consciousness        Pulmonary embolus       Recommendation: Can de-escalate to ancef. Will follow up      [As Noted in HPI] : as noted in HPI [Negative] : Heme/Lymph

## 2021-04-26 NOTE — ASSESSMENT
[FreeTextEntry1] : Impression\par \par Mild chronic nausea\par \par No longer having dysphagia\par \par 3 months or more of loose bowel movements predominantly in the morning, nonbloody\par \par Due for colonoscopy\par \par : Polyp removed 3 years ago\par \par Suggest\par \par Lab work today\par \par Stool studies\par \par Ultrasound of the abdomen\par \par Both upper endoscopy and colonoscopy with biopsies to look for microscopic colitis and celiac disease and other pathology\par \par Covid testing 3 days prior\par \par GoLYTELY\par \par The laxative, or its risks benefits and alternatives have been thoroughly reviewed with the patient in great detail. The laxative instructions have been reviewed in great detail with the patient.\par \par Risks/benefits:\par The procedure, the risks and benefits and alternatives have been reviewed in great detail with the patient.  Risks including, but not limited to sedation such as cardiac and pulmonary compromise, the procedure itself such as bleeding requiring hospitalization, transfusion, surgery, temporary or permanent colostomy.  Perforation or puncture of the requiring hospitalization, surgery, temporary colostomy.\par It has been explained to the patient that though colonoscopy is thought to be the best screening exam for colon cancer and polyps, no screening exam can find all colon polyps or cancers.  \par The patient expresses understanding of the procedure and consents to undergoing the procedure.\par \par

## 2021-04-26 NOTE — HISTORY OF PRESENT ILLNESS
[de-identified] : Dr. Kramer's care this very pleasant 65-year-old female\par \par Seen previously for some vague dysphagia\par \par That is resolved\par \par I had upper endoscopy at the time and a modified barium swallow without any clear-cut etiology\par \par Regardless, those symptoms are gone\par \par Now she is having several months of loose bowel movements in the morning sometimes urgency and some nausea\par \par No vomiting\par \par No abdominal pain\par \par No blood per rectum\par \par No recent travel or antibiotic use\par \par She has a new coronary stent placed in November and she is on Plavix and aspirin\par \par Her exercise tolerance is good and she not having chest pain\par \par There is no blood per rectum or mucus\par \par Is no family history of celiac disease, inflammatory bowel disease or colon or rectal cancer or peptic ulcer disease or gastric cancer

## 2021-04-26 NOTE — PHYSICAL EXAM
[General Appearance - Alert] : alert [General Appearance - In No Acute Distress] : in no acute distress [General Appearance - Well Nourished] : well nourished [General Appearance - Well Developed] : well developed [General Appearance - Well-Appearing] : healthy appearing [FreeTextEntry1] : Healthy heavyset female, no acute distress, alert and oriented x3 [Sclera] : the sclera and conjunctiva were normal [Neck Appearance] : the appearance of the neck was normal [Neck Cervical Mass (___cm)] : no neck mass was observed [Jugular Venous Distention Increased] : there was no jugular-venous distention [Auscultation Breath Sounds / Voice Sounds] : lungs were clear to auscultation bilaterally [Apical Impulse] : the apical impulse was normal [Full Pulse] : the pedal pulses are present [Edema] : there was no peripheral edema [Bowel Sounds] : normal bowel sounds [Abdomen Soft] : soft [Abdomen Tenderness] : non-tender [Abdomen Mass (___ Cm)] : no abdominal mass palpated [Cervical Lymph Nodes Enlarged Posterior Bilaterally] : posterior cervical [Cervical Lymph Nodes Enlarged Anterior Bilaterally] : anterior cervical [Supraclavicular Lymph Nodes Enlarged Bilaterally] : supraclavicular [Axillary Lymph Nodes Enlarged Bilaterally] : axillary [Femoral Lymph Nodes Enlarged Bilaterally] : femoral [Inguinal Lymph Nodes Enlarged Bilaterally] : inguinal [No CVA Tenderness] : no ~M costovertebral angle tenderness [No Spinal Tenderness] : no spinal tenderness [Abnormal Walk] : normal gait [Nail Clubbing] : no clubbing  or cyanosis of the fingernails [Musculoskeletal - Swelling] : no joint swelling seen [Motor Tone] : muscle strength and tone were normal [Skin Color & Pigmentation] : normal skin color and pigmentation [Skin Turgor] : normal skin turgor [] : no rash [No Focal Deficits] : no focal deficits [Oriented To Time, Place, And Person] : oriented to person, place, and time [Impaired Insight] : insight and judgment were intact [Affect] : the affect was normal

## 2021-04-27 ENCOUNTER — NON-APPOINTMENT (OUTPATIENT)
Age: 66
End: 2021-04-27

## 2021-04-27 LAB
ALBUMIN SERPL ELPH-MCNC: 4.9 G/DL
ALP BLD-CCNC: 176 U/L
ALT SERPL-CCNC: 155 U/L
ANION GAP SERPL CALC-SCNC: 13 MMOL/L
AST SERPL-CCNC: 66 U/L
BASOPHILS # BLD AUTO: 0.03 K/UL
BASOPHILS NFR BLD AUTO: 0.4 %
BILIRUB SERPL-MCNC: 0.5 MG/DL
BUN SERPL-MCNC: 21 MG/DL
CALCIUM SERPL-MCNC: 9.6 MG/DL
CHLORIDE SERPL-SCNC: 104 MMOL/L
CO2 SERPL-SCNC: 21 MMOL/L
CREAT SERPL-MCNC: 0.57 MG/DL
CRP SERPL-MCNC: <3 MG/L
EOSINOPHIL # BLD AUTO: 0.13 K/UL
EOSINOPHIL NFR BLD AUTO: 1.7 %
FERRITIN SERPL-MCNC: 276 NG/ML
GLUCOSE SERPL-MCNC: 114 MG/DL
HCT VFR BLD CALC: 41.3 %
HGB BLD-MCNC: 14.4 G/DL
IGA SER QL IEP: 273 MG/DL
IMM GRANULOCYTES NFR BLD AUTO: 0.4 %
IRON SATN MFR SERPL: 30 %
IRON SERPL-MCNC: 118 UG/DL
LYMPHOCYTES # BLD AUTO: 1.78 K/UL
LYMPHOCYTES NFR BLD AUTO: 24 %
MAN DIFF?: NORMAL
MCHC RBC-ENTMCNC: 31.5 PG
MCHC RBC-ENTMCNC: 34.9 GM/DL
MCV RBC AUTO: 90.4 FL
MONOCYTES # BLD AUTO: 0.8 K/UL
MONOCYTES NFR BLD AUTO: 10.8 %
NEUTROPHILS # BLD AUTO: 4.66 K/UL
NEUTROPHILS NFR BLD AUTO: 62.7 %
PLATELET # BLD AUTO: 324 K/UL
POTASSIUM SERPL-SCNC: 4 MMOL/L
PROT SERPL-MCNC: 7 G/DL
RBC # BLD: 4.57 M/UL
RBC # FLD: 11.7 %
SODIUM SERPL-SCNC: 138 MMOL/L
TIBC SERPL-MCNC: 391 UG/DL
TSH SERPL-ACNC: 2.94 UIU/ML
UIBC SERPL-MCNC: 273 UG/DL
WBC # FLD AUTO: 7.43 K/UL

## 2021-04-28 LAB
C DIFF TOX GENS STL QL NAA+PROBE: NORMAL
CDIFF BY PCR: NOT DETECTED
ENDOMYSIUM IGA SER QL: NEGATIVE
ENDOMYSIUM IGA TITR SER: NORMAL
G LAMBLIA AG STL QL: NORMAL
GI PCR PANEL, STOOL: NORMAL
GLIADIN IGA SER QL: <5 UNITS
GLIADIN IGG SER QL: <5 UNITS
GLIADIN PEPTIDE IGA SER-ACNC: NEGATIVE
GLIADIN PEPTIDE IGG SER-ACNC: NEGATIVE
TTG IGA SER IA-ACNC: <1.2 U/ML
TTG IGA SER-ACNC: NEGATIVE
TTG IGG SER IA-ACNC: 7.1 U/ML
TTG IGG SER IA-ACNC: ABNORMAL

## 2021-04-29 ENCOUNTER — NON-APPOINTMENT (OUTPATIENT)
Age: 66
End: 2021-04-29

## 2021-04-29 LAB — DEPRECATED O AND P PREP STL: NORMAL

## 2021-04-30 ENCOUNTER — NON-APPOINTMENT (OUTPATIENT)
Age: 66
End: 2021-04-30

## 2021-04-30 ENCOUNTER — APPOINTMENT (OUTPATIENT)
Dept: ULTRASOUND IMAGING | Facility: CLINIC | Age: 66
End: 2021-04-30
Payer: MEDICAID

## 2021-04-30 ENCOUNTER — OUTPATIENT (OUTPATIENT)
Dept: OUTPATIENT SERVICES | Facility: HOSPITAL | Age: 66
LOS: 1 days | End: 2021-04-30
Payer: MEDICAID

## 2021-04-30 DIAGNOSIS — Z98.890 OTHER SPECIFIED POSTPROCEDURAL STATES: Chronic | ICD-10-CM

## 2021-04-30 DIAGNOSIS — Z00.8 ENCOUNTER FOR OTHER GENERAL EXAMINATION: ICD-10-CM

## 2021-04-30 DIAGNOSIS — Z98.41 CATARACT EXTRACTION STATUS, RIGHT EYE: Chronic | ICD-10-CM

## 2021-04-30 DIAGNOSIS — Z96.641 PRESENCE OF RIGHT ARTIFICIAL HIP JOINT: Chronic | ICD-10-CM

## 2021-04-30 DIAGNOSIS — D11.0 BENIGN NEOPLASM OF PAROTID GLAND: Chronic | ICD-10-CM

## 2021-04-30 DIAGNOSIS — K80.20 CALCULUS OF GALLBLADDER W/OUT CHOLECYSTITIS W/OUT OBSTRUCTION: ICD-10-CM

## 2021-04-30 DIAGNOSIS — Z96.642 PRESENCE OF LEFT ARTIFICIAL HIP JOINT: Chronic | ICD-10-CM

## 2021-04-30 LAB
BACTERIA STL CULT: NORMAL
C DIFF TOX B STL QL CT TISS CULT: NORMAL
CALPROTECTIN FECAL: 146 UG/G
Lab: NORMAL

## 2021-04-30 PROCEDURE — 76700 US EXAM ABDOM COMPLETE: CPT | Mod: 26

## 2021-04-30 PROCEDURE — 76700 US EXAM ABDOM COMPLETE: CPT

## 2021-05-01 LAB
ANTI ENDOMYSIAL IGA IFA: NEGATIVE
ANTI HUMAN TISSUE TRANSGLUTAMINASE IGA ELISA: < 1.9
CELIAC DISEASE INTERPRETATION: NORMAL
CELIAC GENE PAIRS PRESENT: YES
DEAMIDATED GLIADIN ANTIBODY IGG: < 2.8
DEAMIDATED GLIADIN PEPTIDE IGA: < 5.2
DQ ALPHA 1: NORMAL
DQ BETA 1: NORMAL
IMMUNOGLOBULIN A (IGA): 281 MG/DL
PROMETHEUS CELIAC GENETICS: NORMAL
PROMETHEUS CELIAC SEROLOGY: NORMAL
PROMETHEUS LABORATORY FOOTER: NORMAL
TOTAL SERUM IGA BY NEPHELOMETRY: 296 MG/DL
TTG IGG SER IA-ACNC: NORMAL

## 2021-05-03 ENCOUNTER — APPOINTMENT (OUTPATIENT)
Dept: NEUROLOGY | Facility: CLINIC | Age: 66
End: 2021-05-03

## 2021-05-03 LAB — PANCREATIC ELASTASE, FECAL: 325

## 2021-05-05 ENCOUNTER — NON-APPOINTMENT (OUTPATIENT)
Age: 66
End: 2021-05-05

## 2021-05-07 ENCOUNTER — NON-APPOINTMENT (OUTPATIENT)
Age: 66
End: 2021-05-07

## 2021-05-28 ENCOUNTER — APPOINTMENT (OUTPATIENT)
Dept: GASTROENTEROLOGY | Facility: AMBULATORY MEDICAL SERVICES | Age: 66
End: 2021-05-28
Payer: MEDICARE

## 2021-05-28 PROCEDURE — 43239 EGD BIOPSY SINGLE/MULTIPLE: CPT

## 2021-05-28 PROCEDURE — 45380 COLONOSCOPY AND BIOPSY: CPT

## 2021-06-08 ENCOUNTER — RX RENEWAL (OUTPATIENT)
Age: 66
End: 2021-06-08

## 2021-06-10 ENCOUNTER — APPOINTMENT (OUTPATIENT)
Dept: NEUROLOGY | Facility: CLINIC | Age: 66
End: 2021-06-10
Payer: MEDICARE

## 2021-06-10 VITALS
SYSTOLIC BLOOD PRESSURE: 160 MMHG | HEART RATE: 67 BPM | DIASTOLIC BLOOD PRESSURE: 81 MMHG | HEIGHT: 69 IN | WEIGHT: 185 LBS | BODY MASS INDEX: 27.4 KG/M2

## 2021-06-10 VITALS — DIASTOLIC BLOOD PRESSURE: 85 MMHG | HEART RATE: 71 BPM | SYSTOLIC BLOOD PRESSURE: 159 MMHG

## 2021-06-10 PROCEDURE — 99205 OFFICE O/P NEW HI 60 MIN: CPT

## 2021-06-10 RX ORDER — POLYETHYLENE GLYCOL 3350 17 G/17G
17 POWDER, FOR SOLUTION ORAL
Qty: 238 | Refills: 0 | Status: DISCONTINUED | COMMUNITY
Start: 2021-05-19 | End: 2021-06-10

## 2021-06-10 RX ORDER — SODIUM SULFATE, POTASSIUM SULFATE, MAGNESIUM SULFATE 17.5; 3.13; 1.6 G/ML; G/ML; G/ML
17.5-3.13-1.6 SOLUTION, CONCENTRATE ORAL
Qty: 1 | Refills: 0 | Status: DISCONTINUED | COMMUNITY
Start: 2021-04-26 | End: 2021-06-10

## 2021-06-16 ENCOUNTER — NON-APPOINTMENT (OUTPATIENT)
Age: 66
End: 2021-06-16

## 2021-07-01 ENCOUNTER — APPOINTMENT (OUTPATIENT)
Dept: INTERNAL MEDICINE | Facility: CLINIC | Age: 66
End: 2021-07-01
Payer: COMMERCIAL

## 2021-07-01 ENCOUNTER — LABORATORY RESULT (OUTPATIENT)
Age: 66
End: 2021-07-01

## 2021-07-01 VITALS
HEIGHT: 69 IN | WEIGHT: 187 LBS | TEMPERATURE: 98.1 F | SYSTOLIC BLOOD PRESSURE: 191 MMHG | DIASTOLIC BLOOD PRESSURE: 100 MMHG | OXYGEN SATURATION: 98 % | HEART RATE: 77 BPM | BODY MASS INDEX: 27.7 KG/M2

## 2021-07-01 VITALS — DIASTOLIC BLOOD PRESSURE: 80 MMHG | SYSTOLIC BLOOD PRESSURE: 170 MMHG

## 2021-07-01 DIAGNOSIS — K76.0 FATTY (CHANGE OF) LIVER, NOT ELSEWHERE CLASSIFIED: ICD-10-CM

## 2021-07-01 DIAGNOSIS — H53.8 OTHER VISUAL DISTURBANCES: ICD-10-CM

## 2021-07-01 DIAGNOSIS — R79.89 OTHER SPECIFIED ABNORMAL FINDINGS OF BLOOD CHEMISTRY: ICD-10-CM

## 2021-07-01 DIAGNOSIS — Z01.818 ENCOUNTER FOR OTHER PREPROCEDURAL EXAMINATION: ICD-10-CM

## 2021-07-01 DIAGNOSIS — Z20.822 CONTACT WITH AND (SUSPECTED) EXPOSURE TO COVID-19: ICD-10-CM

## 2021-07-01 PROCEDURE — 99072 ADDL SUPL MATRL&STAF TM PHE: CPT

## 2021-07-01 PROCEDURE — G0009: CPT

## 2021-07-01 PROCEDURE — 99215 OFFICE O/P EST HI 40 MIN: CPT | Mod: 25

## 2021-07-01 PROCEDURE — 99497 ADVNCD CARE PLAN 30 MIN: CPT | Mod: 25

## 2021-07-01 PROCEDURE — G0402 INITIAL PREVENTIVE EXAM: CPT

## 2021-07-01 PROCEDURE — 90670 PCV13 VACCINE IM: CPT

## 2021-07-01 PROCEDURE — 36415 COLL VENOUS BLD VENIPUNCTURE: CPT

## 2021-07-02 DIAGNOSIS — R73.03 PREDIABETES.: ICD-10-CM

## 2021-07-02 LAB
25(OH)D3 SERPL-MCNC: 34.2 NG/ML
CHOLEST SERPL-MCNC: 214 MG/DL
CREAT SPEC-SCNC: 42 MG/DL
ESTIMATED AVERAGE GLUCOSE: 151 MG/DL
HBA1C MFR BLD HPLC: 6.9 %
HDLC SERPL-MCNC: 59 MG/DL
LDLC SERPL CALC-MCNC: 129 MG/DL
MICROALBUMIN 24H UR DL<=1MG/L-MCNC: <1.2 MG/DL
MICROALBUMIN/CREAT 24H UR-RTO: NORMAL MG/G
NONHDLC SERPL-MCNC: 155 MG/DL
TRIGL SERPL-MCNC: 128 MG/DL
TSH SERPL-ACNC: 2.05 UIU/ML

## 2021-07-03 LAB
APPEARANCE: CLEAR
BILIRUBIN URINE: NEGATIVE
BLOOD URINE: NEGATIVE
COLOR: NORMAL
GLUCOSE QUALITATIVE U: NEGATIVE
KETONES URINE: NEGATIVE
LEUKOCYTE ESTERASE URINE: ABNORMAL
NITRITE URINE: NEGATIVE
PH URINE: 5.5
PROTEIN URINE: NEGATIVE
SPECIFIC GRAVITY URINE: 1.01
UROBILINOGEN URINE: NORMAL

## 2021-07-08 ENCOUNTER — RX RENEWAL (OUTPATIENT)
Age: 66
End: 2021-07-08

## 2021-07-09 ENCOUNTER — NON-APPOINTMENT (OUTPATIENT)
Age: 66
End: 2021-07-09

## 2021-07-09 ENCOUNTER — RX RENEWAL (OUTPATIENT)
Age: 66
End: 2021-07-09

## 2021-07-20 ENCOUNTER — APPOINTMENT (OUTPATIENT)
Dept: CARDIOLOGY | Facility: CLINIC | Age: 66
End: 2021-07-20
Payer: MEDICARE

## 2021-07-20 VITALS
OXYGEN SATURATION: 98 % | SYSTOLIC BLOOD PRESSURE: 150 MMHG | BODY MASS INDEX: 28.14 KG/M2 | WEIGHT: 190 LBS | HEIGHT: 69 IN | DIASTOLIC BLOOD PRESSURE: 90 MMHG | HEART RATE: 73 BPM | RESPIRATION RATE: 16 BRPM | TEMPERATURE: 97.8 F

## 2021-07-20 PROCEDURE — 99215 OFFICE O/P EST HI 40 MIN: CPT

## 2021-07-20 PROCEDURE — 99072 ADDL SUPL MATRL&STAF TM PHE: CPT

## 2021-07-20 NOTE — HISTORY OF PRESENT ILLNESS
[FreeTextEntry1] : 66 with HLD, CAD/PCI presents for f/u\par PMD Dr Concetta Valdivia/Dr Lola Flores.\par pulm: Dr Tea Zhu\par \par previously, \par pt initially with [facility] 9/2020, echo done at that time revealed grossly normal LV systolic function, mild LVH, MAC and AV calcification with normal opening. Carotid studies revealed b/l 16-49% stenosis. pt was sent for a nuclear stress test which revealed medium sized moderate defects in the distal inferior, inferolateral walls that were reversible. and pt was sent for cardiac cath (11/2020) where she Recieved a 3mm x 24mm JAE to a 90% RCA lesion. TTE did reveal increased RV wall thickness, pt had cMRI, which showed no evidence of myocarditis, cardiomyopathy, or ARVD.   She also had a pulm eval with a CT chest without evidence of parenchymal lung dz, no PE, and PFTs showing mild restrictive pattern and pt was placed on albuterol inhalers. \par \par pt last seen in cardiology 3/21. at that time CP were improving, pt on lopressor 25 bid, and lisinopril 5 was added.\par \par \par pt now presents for follow up.\par pt was recently at pmd, found to have HTN, and her pmd increased her lisinopril to 20. pt not checking bp at home. \par pt very  stressed about her job. she hates her boss, states he makes her life miserable. pt crying just thinking about it.  states she feels physically safe from him. \par \par pt overall feeling well. does state occasional chest pains but she can tolerate it.  episodes are unchanged from prior symptoms.  states episodes are occurring less frequently. \par \par \par \par CAD\par s/p PCI on asa, plavix\par \par  pt denies dizziness, syncope. pt denies LE edema or orthopnea. \par \par recent labs 8/14/2020: tot chol 205 tg 102 hdl 79 ldl 105 Cr 0.58 a1c 6.3\par \par pt received covid vaccine (pfizer)\par \par Med hx: HLD, "heart murmur", chronic back pain, thyroid nodules\par OBGYN hx: does not have children. Currently post menopause\par Sx hx: back sx, hand sx, cataract, hip replacement, removal of benign parotid gland tumor.\par Fam hx: M: breast CA, F: PPM\par Social hx: lives in [place], with twin sister,  . quit tob 3/2020 (30 pk yr hx), drinks wine a couple times a week. denies drug use. \par Meds: lipitor 80 protonix bid, cimetidine, asa, plavix, lopressor 25 bid. lisinopril 20 glyberidealbuterol inhaler.\par Allergies: neosporin, bacitracin (rash). \par

## 2021-07-20 NOTE — DISCUSSION/SUMMARY
[FreeTextEntry1] : 66F with CAD/PCI to RCA, HLD, presents for f/u\par \par 1. CAD\par -s/p PCI to RCA 11/2020\par -cont asa and plavix\par -cont lipitor 80\par -cont lopressor to 25 bid, lisinopril 20\par \par 2. chest pain\par -atypical, unclear etiology, may be stress related, hypertensive related\par -not assoc with exertion\par -euvolemic on exam\par -pt comfortable appearing\par -will monitor\par \par \par \par 2. carotid plaque \par -cont asa, statin\par -asymptomatic, will monitor\par \par \par \par f/u 2 months or sooner if needed. \par

## 2021-07-20 NOTE — REVIEW OF SYSTEMS
[Fever] : no fever [Headache] : no headache [Weight Gain (___ Lbs)] : no recent weight gain [Chills] : no chills [Feeling Fatigued] : not feeling fatigued [Weight Loss (___ Lbs)] : no recent weight loss [Blurry Vision] : no blurred vision [Sore Throat] : no sore throat [SOB] : no shortness of breath [Dyspnea on exertion] : not dyspnea during exertion [Chest Discomfort] : no chest discomfort [Lower Ext Edema] : no extremity edema [Palpitations] : no palpitations [Orthopnea] : no orthopnea [Syncope] : no syncope [Cough] : no cough [Wheezing] : no wheezing [Nausea] : no nausea [Vomiting] : no vomiting [Dizziness] : no dizziness [Confusion] : no confusion was observed [Easy Bleeding] : no tendency for easy bleeding [Easy Bruising] : no tendency for easy bruising

## 2021-08-10 RX ORDER — GLYBURIDE 2.5 MG/1
2.5 TABLET ORAL DAILY
Qty: 30 | Refills: 2 | Status: DISCONTINUED | COMMUNITY
Start: 2021-07-02 | End: 2021-08-10

## 2021-08-31 ENCOUNTER — RX RENEWAL (OUTPATIENT)
Age: 66
End: 2021-08-31

## 2021-09-02 ENCOUNTER — APPOINTMENT (OUTPATIENT)
Dept: INTERNAL MEDICINE | Facility: CLINIC | Age: 66
End: 2021-09-02
Payer: MEDICARE

## 2021-09-02 VITALS
TEMPERATURE: 98 F | BODY MASS INDEX: 27.85 KG/M2 | RESPIRATION RATE: 16 BRPM | HEIGHT: 69 IN | OXYGEN SATURATION: 97 % | DIASTOLIC BLOOD PRESSURE: 85 MMHG | SYSTOLIC BLOOD PRESSURE: 167 MMHG | WEIGHT: 188 LBS | HEART RATE: 67 BPM

## 2021-09-02 DIAGNOSIS — I10 ESSENTIAL (PRIMARY) HYPERTENSION: ICD-10-CM

## 2021-09-02 DIAGNOSIS — Z23 ENCOUNTER FOR IMMUNIZATION: ICD-10-CM

## 2021-09-02 DIAGNOSIS — M62.830 MUSCLE SPASM OF BACK: ICD-10-CM

## 2021-09-02 PROCEDURE — 99214 OFFICE O/P EST MOD 30 MIN: CPT

## 2021-09-02 RX ORDER — DICLOFENAC SODIUM 0.1 %
0.1 DROPS OPHTHALMIC (EYE)
Refills: 0 | Status: ACTIVE | COMMUNITY

## 2021-09-02 RX ORDER — LISINOPRIL 20 MG/1
20 TABLET ORAL DAILY
Qty: 30 | Refills: 1 | Status: DISCONTINUED | COMMUNITY
Start: 2021-03-18 | End: 2021-09-02

## 2021-09-02 RX ORDER — DICLOFENAC SODIUM 75 MG/1
75 TABLET, DELAYED RELEASE ORAL
Qty: 60 | Refills: 1 | Status: DISCONTINUED | COMMUNITY
Start: 2020-07-10 | End: 2021-09-02

## 2021-09-09 ENCOUNTER — APPOINTMENT (OUTPATIENT)
Dept: CARDIOLOGY | Facility: CLINIC | Age: 66
End: 2021-09-09
Payer: MEDICAID

## 2021-09-09 VITALS
DIASTOLIC BLOOD PRESSURE: 94 MMHG | BODY MASS INDEX: 28.14 KG/M2 | OXYGEN SATURATION: 95 % | WEIGHT: 190 LBS | HEIGHT: 69 IN | SYSTOLIC BLOOD PRESSURE: 168 MMHG | HEART RATE: 64 BPM

## 2021-09-09 PROCEDURE — 99215 OFFICE O/P EST HI 40 MIN: CPT

## 2021-09-09 NOTE — HISTORY OF PRESENT ILLNESS
[FreeTextEntry1] : 66 with HLD, CAD/PCI presents for f/u\par PMD Dr Concetta Valdivia/Dr Lola Flores.\par pulm: Dr Tea Zhu\par \par previously, \par pt initially with [facility] 9/2020, echo done at that time revealed grossly normal LV systolic function, mild LVH, MAC and AV calcification with normal opening. Carotid studies revealed b/l 16-49% stenosis. pt was sent for a nuclear stress test which revealed medium sized moderate defects in the distal inferior, inferolateral walls that were reversible. and pt was sent for cardiac cath (11/2020) where she Recieved a 3mm x 24mm JAE to a 90% RCA lesion. TTE did reveal increased RV wall thickness, pt had cMRI, which showed no evidence of myocarditis, cardiomyopathy, or ARVD. She also had a pulm eval with a CT chest without evidence of parenchymal lung dz, no PE, and PFTs showing mild restrictive pattern and pt was placed on albuterol inhalers. \par \par pt last seen in cardiology 7/21. at that time CP were improving, pt with significant stress at work, unable to tolerate her boss, stated she felt like she was "not allowed" to quit. bp cont to be elevated, on lopressor 25 bid, and lisinopril 20\par \par \par pt now presents for follow up.\par pt states her mood is slightly better, still working with the same boss, did not quit yet, but states she has slowed down her workload and it has been more bearable. BP remains high, now on lopressor 25 and lisinopril 20, was recently given hctz 12.5 and states she will start taking it tomorrow. \par \par pt overall feeling well. does state occasional chest pains, not on exertion. but she can tolerate it. episodes are unchanged from prior symptoms. states episodes are occurring less frequently. \par \par \par CAD\par s/p PCI on asa, plavix\par \par pt denies dizziness, syncope. pt denies LE edema or orthopnea. \par \par recent labs 8/14/2020: tot chol 205 tg 102 hdl 79 ldl 105 Cr 0.58 a1c 6.3\par \par pt received covid vaccine (pfizer)\par \par Med hx: HLD, "heart murmur", chronic back pain, thyroid nodules\par OBGYN hx: does not have children. Currently post menopause\par Sx hx: back sx, hand sx, cataract, hip replacement, removal of benign parotid gland tumor.\par Fam hx: M: breast CA, F: PPM\par Social hx: lives in [place], with twin sister, . quit tob 3/2020 (30 pk yr hx), drinks wine a couple times a week. denies drug use. \par Meds: lipitor 80 protonix bid, cimetidine, asa, plavix, lopressor 25 bid. lisinopril 20 glimiperide 1 mg bid, albuterol inhaler.\par Allergies: neosporin, bacitracin (rash). \par

## 2021-09-09 NOTE — DISCUSSION/SUMMARY
[FreeTextEntry1] : 66F with CAD/PCI to RCA, HLD, presents for f/u\par \par 1. CAD\par -s/p PCI to RCA 11/2020\par -cont asa and plavix, plan to d/c plavix in 11/21\par -cont lipitor 80\par -cont lopressor to 25 bid, lisinopril 20\par \par 2. chest pain\par -atypical, unclear etiology, may be stress related, hypertensive related\par -not assoc with exertion\par -euvolemic on exam\par -pt comfortable appearing\par -will monitor\par \par \par \par 3. carotid plaque \par -cont asa, statin\par -asymptomatic, will monitor\par \par 4. HTN\par -remains elevated\par -cont lopresor, lisinopril and hctz\par \par f/u 2 months or sooner if needed. \par \par \par

## 2021-09-09 NOTE — PHYSICAL EXAM
[Well Developed] : well developed [Well Nourished] : well nourished [No Acute Distress] : no acute distress [Normal Venous Pressure] : normal venous pressure [No Murmur] : no murmur [Normal S1, S2] : normal S1, S2 [Clear Lung Fields] : clear lung fields [Good Air Entry] : good air entry [Soft] : abdomen soft [Non Tender] : non-tender [Normal Gait] : normal gait [Moves all extremities] : moves all extremities [No Focal Deficits] : no focal deficits [Alert and Oriented] : alert and oriented [de-identified] : trace b/l LE edema

## 2021-09-09 NOTE — REVIEW OF SYSTEMS
[Fever] : no fever [Headache] : no headache [Weight Gain (___ Lbs)] : no recent weight gain [Chills] : no chills [Feeling Fatigued] : not feeling fatigued [Weight Loss (___ Lbs)] : no recent weight loss [Blurry Vision] : no blurred vision [Sore Throat] : no sore throat [SOB] : no shortness of breath [Dyspnea on exertion] : not dyspnea during exertion [Chest Discomfort] : no chest discomfort [Lower Ext Edema] : no extremity edema [Palpitations] : no palpitations [Orthopnea] : no orthopnea [Syncope] : no syncope [Cough] : no cough [Wheezing] : no wheezing [Nausea] : no nausea [Vomiting] : no vomiting [Dizziness] : no dizziness [Confusion] : no confusion was observed [Easy Bleeding] : no tendency for easy bleeding

## 2021-09-16 ENCOUNTER — TRANSCRIPTION ENCOUNTER (OUTPATIENT)
Age: 66
End: 2021-09-16

## 2021-09-16 ENCOUNTER — OUTPATIENT (OUTPATIENT)
Dept: OUTPATIENT SERVICES | Facility: HOSPITAL | Age: 66
LOS: 1 days | End: 2021-09-16
Payer: COMMERCIAL

## 2021-09-16 ENCOUNTER — RESULT REVIEW (OUTPATIENT)
Age: 66
End: 2021-09-16

## 2021-09-16 ENCOUNTER — APPOINTMENT (OUTPATIENT)
Dept: MAMMOGRAPHY | Facility: CLINIC | Age: 66
End: 2021-09-16
Payer: MEDICARE

## 2021-09-16 ENCOUNTER — APPOINTMENT (OUTPATIENT)
Dept: ULTRASOUND IMAGING | Facility: CLINIC | Age: 66
End: 2021-09-16
Payer: MEDICARE

## 2021-09-16 ENCOUNTER — APPOINTMENT (OUTPATIENT)
Dept: RADIOLOGY | Facility: CLINIC | Age: 66
End: 2021-09-16
Payer: MEDICARE

## 2021-09-16 DIAGNOSIS — E04.2 NONTOXIC MULTINODULAR GOITER: ICD-10-CM

## 2021-09-16 DIAGNOSIS — Z96.642 PRESENCE OF LEFT ARTIFICIAL HIP JOINT: Chronic | ICD-10-CM

## 2021-09-16 DIAGNOSIS — R92.2 INCONCLUSIVE MAMMOGRAM: ICD-10-CM

## 2021-09-16 DIAGNOSIS — Z98.890 OTHER SPECIFIED POSTPROCEDURAL STATES: Chronic | ICD-10-CM

## 2021-09-16 DIAGNOSIS — Z98.41 CATARACT EXTRACTION STATUS, RIGHT EYE: Chronic | ICD-10-CM

## 2021-09-16 DIAGNOSIS — D11.0 BENIGN NEOPLASM OF PAROTID GLAND: Chronic | ICD-10-CM

## 2021-09-16 DIAGNOSIS — Z96.641 PRESENCE OF RIGHT ARTIFICIAL HIP JOINT: Chronic | ICD-10-CM

## 2021-09-16 DIAGNOSIS — Z12.31 ENCOUNTER FOR SCREENING MAMMOGRAM FOR MALIGNANT NEOPLASM OF BREAST: ICD-10-CM

## 2021-09-16 PROCEDURE — 77067 SCR MAMMO BI INCL CAD: CPT | Mod: 26

## 2021-09-16 PROCEDURE — 76641 ULTRASOUND BREAST COMPLETE: CPT | Mod: 26,50

## 2021-09-16 PROCEDURE — 77067 SCR MAMMO BI INCL CAD: CPT

## 2021-09-16 PROCEDURE — 76536 US EXAM OF HEAD AND NECK: CPT | Mod: 26

## 2021-09-16 PROCEDURE — 77063 BREAST TOMOSYNTHESIS BI: CPT | Mod: 26

## 2021-09-16 PROCEDURE — 76641 ULTRASOUND BREAST COMPLETE: CPT

## 2021-09-16 PROCEDURE — 76536 US EXAM OF HEAD AND NECK: CPT

## 2021-09-16 PROCEDURE — 77063 BREAST TOMOSYNTHESIS BI: CPT

## 2021-09-21 ENCOUNTER — OUTPATIENT (OUTPATIENT)
Dept: OUTPATIENT SERVICES | Facility: HOSPITAL | Age: 66
LOS: 1 days | End: 2021-09-21
Payer: COMMERCIAL

## 2021-09-21 ENCOUNTER — APPOINTMENT (OUTPATIENT)
Dept: MRI IMAGING | Facility: CLINIC | Age: 66
End: 2021-09-21
Payer: MEDICARE

## 2021-09-21 DIAGNOSIS — Z96.642 PRESENCE OF LEFT ARTIFICIAL HIP JOINT: Chronic | ICD-10-CM

## 2021-09-21 DIAGNOSIS — Z98.890 OTHER SPECIFIED POSTPROCEDURAL STATES: Chronic | ICD-10-CM

## 2021-09-21 DIAGNOSIS — Z00.8 ENCOUNTER FOR OTHER GENERAL EXAMINATION: ICD-10-CM

## 2021-09-21 DIAGNOSIS — D11.0 BENIGN NEOPLASM OF PAROTID GLAND: Chronic | ICD-10-CM

## 2021-09-21 DIAGNOSIS — Z98.41 CATARACT EXTRACTION STATUS, RIGHT EYE: Chronic | ICD-10-CM

## 2021-09-21 DIAGNOSIS — Z96.641 PRESENCE OF RIGHT ARTIFICIAL HIP JOINT: Chronic | ICD-10-CM

## 2021-09-21 PROCEDURE — 72148 MRI LUMBAR SPINE W/O DYE: CPT | Mod: 26

## 2021-09-21 PROCEDURE — 72148 MRI LUMBAR SPINE W/O DYE: CPT

## 2021-09-29 ENCOUNTER — RX CHANGE (OUTPATIENT)
Age: 66
End: 2021-09-29

## 2021-10-12 ENCOUNTER — NON-APPOINTMENT (OUTPATIENT)
Age: 66
End: 2021-10-12

## 2021-10-12 ENCOUNTER — RX RENEWAL (OUTPATIENT)
Age: 66
End: 2021-10-12

## 2021-10-21 ENCOUNTER — LABORATORY RESULT (OUTPATIENT)
Age: 66
End: 2021-10-21

## 2021-10-21 ENCOUNTER — APPOINTMENT (OUTPATIENT)
Dept: INTERNAL MEDICINE | Facility: CLINIC | Age: 66
End: 2021-10-21
Payer: MEDICARE

## 2021-10-21 VITALS — SYSTOLIC BLOOD PRESSURE: 140 MMHG | DIASTOLIC BLOOD PRESSURE: 82 MMHG

## 2021-10-21 VITALS
OXYGEN SATURATION: 99 % | DIASTOLIC BLOOD PRESSURE: 84 MMHG | WEIGHT: 187 LBS | HEART RATE: 92 BPM | TEMPERATURE: 97 F | SYSTOLIC BLOOD PRESSURE: 160 MMHG | BODY MASS INDEX: 27.62 KG/M2

## 2021-10-21 DIAGNOSIS — R11.0 NAUSEA: ICD-10-CM

## 2021-10-21 DIAGNOSIS — R19.5 OTHER FECAL ABNORMALITIES: ICD-10-CM

## 2021-10-21 PROCEDURE — 99215 OFFICE O/P EST HI 40 MIN: CPT

## 2021-10-22 ENCOUNTER — TRANSCRIPTION ENCOUNTER (OUTPATIENT)
Age: 66
End: 2021-10-22

## 2021-10-22 RX ORDER — CEFUROXIME AXETIL 500 MG/1
500 TABLET ORAL
Qty: 14 | Refills: 0 | Status: DISCONTINUED | COMMUNITY
Start: 2021-10-21 | End: 2021-10-22

## 2021-11-01 ENCOUNTER — NON-APPOINTMENT (OUTPATIENT)
Age: 66
End: 2021-11-01

## 2021-11-15 ENCOUNTER — NON-APPOINTMENT (OUTPATIENT)
Age: 66
End: 2021-11-15

## 2021-11-19 ENCOUNTER — APPOINTMENT (OUTPATIENT)
Dept: CARDIOLOGY | Facility: CLINIC | Age: 66
End: 2021-11-19
Payer: MEDICARE

## 2021-11-19 ENCOUNTER — NON-APPOINTMENT (OUTPATIENT)
Age: 66
End: 2021-11-19

## 2021-11-19 VITALS
OXYGEN SATURATION: 99 % | HEART RATE: 56 BPM | DIASTOLIC BLOOD PRESSURE: 76 MMHG | TEMPERATURE: 98 F | HEIGHT: 69 IN | SYSTOLIC BLOOD PRESSURE: 130 MMHG | WEIGHT: 185 LBS | BODY MASS INDEX: 27.4 KG/M2

## 2021-11-19 PROCEDURE — 99215 OFFICE O/P EST HI 40 MIN: CPT

## 2021-11-19 PROCEDURE — 93000 ELECTROCARDIOGRAM COMPLETE: CPT

## 2021-11-19 RX ORDER — CLOPIDOGREL BISULFATE 75 MG/1
75 TABLET, FILM COATED ORAL DAILY
Qty: 90 | Refills: 3 | Status: DISCONTINUED | COMMUNITY
Start: 2020-11-25 | End: 2021-11-19

## 2021-11-19 NOTE — PHYSICAL EXAM
[Well Developed] : well developed [Well Nourished] : well nourished [No Acute Distress] : no acute distress [Normal Venous Pressure] : normal venous pressure [Normal S1, S2] : normal S1, S2 [No Murmur] : no murmur [Clear Lung Fields] : clear lung fields [Good Air Entry] : good air entry [Soft] : abdomen soft [Non Tender] : non-tender [Normal Gait] : normal gait [No Edema] : no edema [Moves all extremities] : moves all extremities [No Focal Deficits] : no focal deficits [Alert and Oriented] : alert and oriented

## 2021-11-19 NOTE — HISTORY OF PRESENT ILLNESS
[FreeTextEntry1] : 66 with HLD, CAD/PCI presents for f/u\par PMD Dr Concetta Valdivia/Dr Lola Flores.\par pulm: Dr Tea Zhu\par \par previously, \par pt initially with [facility] 9/2020, echo done at that time revealed grossly normal LV systolic function, mild LVH, MAC and AV calcification with normal opening. Carotid studies revealed b/l 16-49% stenosis. pt was sent for a nuclear stress test which revealed medium sized moderate defects in the distal inferior, inferolateral walls that were reversible. and pt was sent for cardiac cath (11/2020) where she Recieved a 3mm x 24mm JAE to a 90% RCA lesion. TTE did reveal increased RV wall thickness, pt had cMRI, which showed no evidence of myocarditis, cardiomyopathy, or ARVD. She also had a pulm eval with a CT chest without evidence of parenchymal lung dz, no PE, and PFTs showing mild restrictive pattern and pt was placed on albuterol inhalers. \par \par pt last seen in cardiology 9/21. at that time pt overall feeling well, still with occasional cp, unchanged from her prev symptoms but she is "able to handle it" pt with significant stress at work, unable to tolerate her boss, stated she felt like she was "not allowed" to quit. bp cont to be elevated, on lopressor 25 bid, and lisinopril 20, and about to start hctz 12.5. \par \par \par pt now presents for follow up.\par pt feeling well overall, does endorse occasional episodes of CP overnight, states they are less frequent that before, states she can tolerate it. \par pt not checking bp at home, but states full compliance with her meds, pt on lopressor, lisinopril-hctz\par \par \par CAD\par s/p PCI on asa, plavix, stent was 11/2020. \par \par pt denies dizziness, syncope. pt denies LE edema or orthopnea. \par \par recent labs 8/14/2020: tot chol 205 tg 102 hdl 79 ldl 105 Cr 0.58 a1c 6.3\par \par pt received covid vaccine (pfizer)\par \par Med hx: HLD, "heart murmur", chronic back pain, thyroid nodules\par OBGYN hx: does not have children. Currently post menopause\par Sx hx: back sx, hand sx, cataract, hip replacement, removal of benign parotid gland tumor.\par Fam hx: M: breast CA, F: PPM\par Social hx: lives in [place], with twin sister, . quit tob 3/2020 (30 pk yr hx), drinks wine a couple times a week. denies drug use. \par Meds: lipitor 80 protonix bid, cimetidine, asa, plavix, lopressor 25 bid. lisinopril 20 glimiperide 1 mg bid, albuterol inhaler.\par Allergies: neosporin, bacitracin (rash). \par \par

## 2021-11-19 NOTE — DISCUSSION/SUMMARY
[FreeTextEntry1] : 66F with CAD/PCI to RCA, HLD, presents for f/u\par \par 1. CAD\par -s/p PCI to RCA 11/2020\par -cont asa and plavix, can d/c plavix \par -cont lipitor 80\par -cont metoprolol, will change from lopressor 25 bid to toprol 50, \par -cont lisinopril 20\par \par 2. chest pain\par -atypical, unclear etiology, may be stress related, hypertensive related\par -not assoc with exertion\par -euvolemic on exam\par -pt comfortable appearing\par -will monitor\par \par \par \par 3. carotid plaque \par -cont asa, statin\par -asymptomatic, will monitor\par \par 4. HTN\par -remains elevated\par -cont lopresor, lisinopril and hctz\par \par f/u 4 months or sooner if needed. \par \par \par

## 2021-12-04 DIAGNOSIS — E04.2 NONTOXIC MULTINODULAR GOITER: ICD-10-CM

## 2021-12-06 ENCOUNTER — APPOINTMENT (OUTPATIENT)
Dept: SURGERY | Facility: CLINIC | Age: 66
End: 2021-12-06
Payer: MEDICARE

## 2021-12-06 PROCEDURE — 99212 OFFICE O/P EST SF 10 MIN: CPT

## 2021-12-06 NOTE — PHYSICAL EXAM
[Midline] : located in midline position [Normal] : orientation to person, place, and time: normal [de-identified] : Extremities: FRAZIER x 4, ambulates with a cane.   Skin: No obvious skin lesions.   Voice: clear

## 2021-12-07 NOTE — ASU PATIENT PROFILE, ADULT - PATIENT REPRESENTATIVE NAME
PER PODIATRY  TAKE MEDS AS DIRECTED  REST ICE ELEVATE  KEEP DRESSING CLEAN DRY INTACT  DO NOT TAKE OFF DRESSING  USE CRUTCHED TO REMAIN NON WEIGHT BEARING  FOLLOW UP IN CLINIC IN 1 WEEK     Lyubov Anand sister

## 2021-12-16 ENCOUNTER — LABORATORY RESULT (OUTPATIENT)
Age: 66
End: 2021-12-16

## 2021-12-20 ENCOUNTER — TRANSCRIPTION ENCOUNTER (OUTPATIENT)
Age: 66
End: 2021-12-20

## 2021-12-20 LAB
ALBUMIN SERPL ELPH-MCNC: 4.5 G/DL
ALP BLD-CCNC: 151 U/L
ALT SERPL-CCNC: 84 U/L
ANION GAP SERPL CALC-SCNC: 15 MMOL/L
APPEARANCE: CLEAR
AST SERPL-CCNC: 54 U/L
BASOPHILS # BLD AUTO: 0.03 K/UL
BASOPHILS NFR BLD AUTO: 0.5 %
BILIRUB SERPL-MCNC: 0.6 MG/DL
BILIRUBIN URINE: NEGATIVE
BLOOD URINE: NEGATIVE
BUN SERPL-MCNC: 15 MG/DL
CALCIUM SERPL-MCNC: 9.9 MG/DL
CHLORIDE SERPL-SCNC: 102 MMOL/L
CHOLEST SERPL-MCNC: 232 MG/DL
CO2 SERPL-SCNC: 21 MMOL/L
COLOR: COLORLESS
CREAT SERPL-MCNC: 0.61 MG/DL
EOSINOPHIL # BLD AUTO: 0.16 K/UL
EOSINOPHIL NFR BLD AUTO: 2.7 %
ESTIMATED AVERAGE GLUCOSE: 128 MG/DL
GLUCOSE QUALITATIVE U: NEGATIVE
GLUCOSE SERPL-MCNC: 134 MG/DL
HBA1C MFR BLD HPLC: 6.1 %
HCT VFR BLD CALC: 39.1 %
HDLC SERPL-MCNC: 60 MG/DL
HGB BLD-MCNC: 12.9 G/DL
IMM GRANULOCYTES NFR BLD AUTO: 0.3 %
KETONES URINE: NEGATIVE
LDLC SERPL CALC-MCNC: 141 MG/DL
LEUKOCYTE ESTERASE URINE: ABNORMAL
LYMPHOCYTES # BLD AUTO: 1.59 K/UL
LYMPHOCYTES NFR BLD AUTO: 27.3 %
MAN DIFF?: NORMAL
MCHC RBC-ENTMCNC: 32.1 PG
MCHC RBC-ENTMCNC: 33 GM/DL
MCV RBC AUTO: 97.3 FL
MONOCYTES # BLD AUTO: 0.96 K/UL
MONOCYTES NFR BLD AUTO: 16.5 %
NEUTROPHILS # BLD AUTO: 3.07 K/UL
NEUTROPHILS NFR BLD AUTO: 52.7 %
NITRITE URINE: NEGATIVE
NONHDLC SERPL-MCNC: 172 MG/DL
PH URINE: 7
PLATELET # BLD AUTO: 308 K/UL
POTASSIUM SERPL-SCNC: 4.7 MMOL/L
PROT SERPL-MCNC: 6.9 G/DL
PROTEIN URINE: NEGATIVE
RBC # BLD: 4.02 M/UL
RBC # FLD: 12.8 %
SODIUM SERPL-SCNC: 138 MMOL/L
SPECIFIC GRAVITY URINE: 1.01
TRIGL SERPL-MCNC: 153 MG/DL
UROBILINOGEN URINE: NORMAL
WBC # FLD AUTO: 5.83 K/UL

## 2021-12-30 ENCOUNTER — RX RENEWAL (OUTPATIENT)
Age: 66
End: 2021-12-30

## 2022-01-06 ENCOUNTER — RX RENEWAL (OUTPATIENT)
Age: 67
End: 2022-01-06

## 2022-01-12 ENCOUNTER — NON-APPOINTMENT (OUTPATIENT)
Age: 67
End: 2022-01-12

## 2022-01-12 ENCOUNTER — RX RENEWAL (OUTPATIENT)
Age: 67
End: 2022-01-12

## 2022-01-23 ENCOUNTER — RX RENEWAL (OUTPATIENT)
Age: 67
End: 2022-01-23

## 2022-01-28 ENCOUNTER — APPOINTMENT (OUTPATIENT)
Dept: INTERNAL MEDICINE | Facility: CLINIC | Age: 67
End: 2022-01-28
Payer: MEDICARE

## 2022-01-28 VITALS
BODY MASS INDEX: 28.19 KG/M2 | HEART RATE: 70 BPM | RESPIRATION RATE: 16 BRPM | SYSTOLIC BLOOD PRESSURE: 178 MMHG | OXYGEN SATURATION: 99 % | WEIGHT: 190.3 LBS | DIASTOLIC BLOOD PRESSURE: 81 MMHG | TEMPERATURE: 97.8 F | HEIGHT: 69 IN

## 2022-01-28 DIAGNOSIS — Z87.898 PERSONAL HISTORY OF OTHER SPECIFIED CONDITIONS: ICD-10-CM

## 2022-01-28 DIAGNOSIS — R05.8 OTHER SPECIFIED COUGH: ICD-10-CM

## 2022-01-28 DIAGNOSIS — E11.9 TYPE 2 DIABETES MELLITUS W/OUT COMPLICATIONS: ICD-10-CM

## 2022-01-28 DIAGNOSIS — Z96.641 PRESENCE OF RIGHT ARTIFICIAL HIP JOINT: ICD-10-CM

## 2022-01-28 DIAGNOSIS — J06.9 ACUTE UPPER RESPIRATORY INFECTION, UNSPECIFIED: ICD-10-CM

## 2022-01-28 DIAGNOSIS — Z92.29 PERSONAL HISTORY OF OTHER DRUG THERAPY: ICD-10-CM

## 2022-01-28 DIAGNOSIS — Z96.642 PRESENCE OF LEFT ARTIFICIAL HIP JOINT: ICD-10-CM

## 2022-01-28 DIAGNOSIS — R13.19 OTHER DYSPHAGIA: ICD-10-CM

## 2022-01-28 DIAGNOSIS — Z87.19 PERSONAL HISTORY OF OTHER DISEASES OF THE DIGESTIVE SYSTEM: ICD-10-CM

## 2022-01-28 PROCEDURE — 99214 OFFICE O/P EST MOD 30 MIN: CPT | Mod: 25

## 2022-01-28 PROCEDURE — G0296 VISIT TO DETERM LDCT ELIG: CPT

## 2022-01-28 RX ORDER — CEFADROXIL 500 MG/5ML
500 POWDER, FOR SUSPENSION ORAL
Qty: 1 | Refills: 0 | Status: DISCONTINUED | COMMUNITY
Start: 2021-10-22 | End: 2022-01-28

## 2022-01-29 ENCOUNTER — NON-APPOINTMENT (OUTPATIENT)
Age: 67
End: 2022-01-29

## 2022-02-03 ENCOUNTER — APPOINTMENT (OUTPATIENT)
Dept: INTERNAL MEDICINE | Facility: CLINIC | Age: 67
End: 2022-02-03
Payer: MEDICARE

## 2022-02-03 ENCOUNTER — RESULT REVIEW (OUTPATIENT)
Age: 67
End: 2022-02-03

## 2022-02-03 ENCOUNTER — NON-APPOINTMENT (OUTPATIENT)
Age: 67
End: 2022-02-03

## 2022-02-03 VITALS — BODY MASS INDEX: 27.4 KG/M2 | HEIGHT: 69 IN | WEIGHT: 185 LBS

## 2022-02-03 PROCEDURE — 99214 OFFICE O/P EST MOD 30 MIN: CPT | Mod: 95

## 2022-02-03 NOTE — PLAN
[Smoking Cessation] : smoking cessation [Regular follow-up with healthcare provider] : regular follow-up with healthcare provider [FreeTextEntry1] : \par Plan:\par -Low Dose CT chest for lung cancer screening\par -Follow up with patient and her referring provider after her LDCT results have been reviewed b\par -Encouraged continued smoking abstinence\par \par \par

## 2022-02-03 NOTE — HISTORY OF PRESENT ILLNESS
[Former] : former smoker [_____ pack-years] : [unfilled] pack-years [TextBox_13] : \par Referred by [facility]. \par \par Ms. CURT DICKENS  is a 66 year old woman with a history of tobacco dependnece. \par \par She  was seen in the office by Dr. Kramer  for review of eligibility for, as well as, discussion of Low-Dose CT lung cancer screening program. Over the telephone today we reviewed and confirmed that the patient meets screening eligibility criteria:\par -Age: 66 year \par Smoking status:\par -Former smoker\par -Number of pack(s) per day: 0.5\par -Number of years smoked: 40\par -Number of pack years smokin\par -Number of years since quitting smokin month \par -Quit year: 2022\par \par Ms. DICKENS denies any signs or symptoms of lung cancer including new cough, change in cough, hemoptysis and unintentional weight loss. \par \par Ms. DICKENS denies any personal history of lung cancer. No lung cancer in a 1st degree relative. Denies any history of lung disease. Denies any history of occupational exposures\par \par

## 2022-02-03 NOTE — REASON FOR VISIT
[Initial Evaluation] : an initial evaluation visit [Review of Eligibility] : review of eligibility [Low-Dose CT Screening Discussion] : low-dose CT lung cancer screening discussion [Virtual Visit] : virtual visit [Home] : at home, [unfilled] , at the time of the visit. [Medical Office: ([facility])___] : at the medical office located in  [Verbal consent obtained from patient] : the patient, [unfilled]

## 2022-02-10 ENCOUNTER — APPOINTMENT (OUTPATIENT)
Dept: RADIOLOGY | Facility: CLINIC | Age: 67
End: 2022-02-10

## 2022-02-10 ENCOUNTER — OUTPATIENT (OUTPATIENT)
Dept: OUTPATIENT SERVICES | Facility: HOSPITAL | Age: 67
LOS: 1 days | End: 2022-02-10
Payer: COMMERCIAL

## 2022-02-10 ENCOUNTER — TRANSCRIPTION ENCOUNTER (OUTPATIENT)
Age: 67
End: 2022-02-10

## 2022-02-10 ENCOUNTER — APPOINTMENT (OUTPATIENT)
Dept: CT IMAGING | Facility: CLINIC | Age: 67
End: 2022-02-10
Payer: MEDICARE

## 2022-02-10 DIAGNOSIS — Z98.890 OTHER SPECIFIED POSTPROCEDURAL STATES: Chronic | ICD-10-CM

## 2022-02-10 DIAGNOSIS — Z96.642 PRESENCE OF LEFT ARTIFICIAL HIP JOINT: Chronic | ICD-10-CM

## 2022-02-10 DIAGNOSIS — Z87.891 PERSONAL HISTORY OF NICOTINE DEPENDENCE: ICD-10-CM

## 2022-02-10 DIAGNOSIS — Z98.41 CATARACT EXTRACTION STATUS, RIGHT EYE: Chronic | ICD-10-CM

## 2022-02-10 DIAGNOSIS — D11.0 BENIGN NEOPLASM OF PAROTID GLAND: Chronic | ICD-10-CM

## 2022-02-10 DIAGNOSIS — Z96.641 PRESENCE OF RIGHT ARTIFICIAL HIP JOINT: Chronic | ICD-10-CM

## 2022-02-10 PROCEDURE — 72220 X-RAY EXAM SACRUM TAILBONE: CPT | Mod: 26

## 2022-02-10 PROCEDURE — 71271 CT THORAX LUNG CANCER SCR C-: CPT | Mod: 26

## 2022-02-10 PROCEDURE — 71271 CT THORAX LUNG CANCER SCR C-: CPT

## 2022-02-10 PROCEDURE — 72220 X-RAY EXAM SACRUM TAILBONE: CPT

## 2022-02-11 ENCOUNTER — TRANSCRIPTION ENCOUNTER (OUTPATIENT)
Age: 67
End: 2022-02-11

## 2022-03-14 ENCOUNTER — APPOINTMENT (OUTPATIENT)
Dept: INTERNAL MEDICINE | Facility: CLINIC | Age: 67
End: 2022-03-14
Payer: MEDICARE

## 2022-03-14 PROCEDURE — 99214 OFFICE O/P EST MOD 30 MIN: CPT | Mod: 95

## 2022-03-18 ENCOUNTER — APPOINTMENT (OUTPATIENT)
Dept: CARDIOLOGY | Facility: CLINIC | Age: 67
End: 2022-03-18
Payer: MEDICARE

## 2022-03-18 VITALS
TEMPERATURE: 98.1 F | DIASTOLIC BLOOD PRESSURE: 75 MMHG | HEART RATE: 65 BPM | HEIGHT: 69 IN | OXYGEN SATURATION: 99 % | BODY MASS INDEX: 27.85 KG/M2 | SYSTOLIC BLOOD PRESSURE: 121 MMHG | WEIGHT: 188 LBS

## 2022-03-18 PROCEDURE — 99215 OFFICE O/P EST HI 40 MIN: CPT

## 2022-03-18 NOTE — HISTORY OF PRESENT ILLNESS
[FreeTextEntry1] : 66 with HLD, CAD/PCI presents for f/u\par PMD Dr Concetta Valdivia/Dr Lola Flores.\par pulm: Dr Tea Zhu\par \par previously, \par pt initially with ENC[facility]AS 9/2020, echo done at that time revealed grossly normal LV systolic function, mild LVH, MAC and AV calcification with normal opening. Carotid studies revealed b/l 16-49% stenosis. pt was sent for a nuclear stress test which revealed medium sized moderate defects in the distal inferior, inferolateral walls that were reversible. and pt was sent for cardiac cath (11/2020) where she Recieved a 3mm x 24mm JAE to a 90% RCA lesion. TTE did reveal increased RV wall thickness, pt had cMRI, which showed no evidence of myocarditis, cardiomyopathy, or ARVD. She also had a pulm eval with a CT chest without evidence of parenchymal lung dz, no PE, and PFTs showing mild restrictive pattern and pt was placed on albuterol inhalers. \par \par pt last seen in cardiology 11/21. at that time pt overall feeling well, still with occasional cp, unchanged from her prev symptoms but she is "able to handle it" pt with significant stress at work, unable to tolerate her boss, stated she felt like she was "not allowed" to quit. bp cont to be elevated, on lopressor 25 bid, and lisinopril 20, and about to start hctz 12.5. \par \par \par pt now presents for follow up.\par \par \par pt overall feeling well. denies cp or sob, at rest or on exertion. denies palpitations, dizziness, or syncope. Now, only endorsing chronic back pain.\par Pt states she started working from home several days a week which has significantly decreased her stress level. \par \par pt not checking bp at home, but states full compliance with her meds, pt on lopressor, lisinopril-hctz\par \par \par CAD\par s/p PCI on asa, plavix, stent was 11/2020. \par \par pt denies dizziness, syncope. pt denies LE edema or orthopnea. \par \par recent labs 8/14/2020: tot chol 205 tg 102 hdl 79 ldl 105 Cr 0.58 a1c 6.3\par \par pt received covid vaccine (pfizer)\par \par Med hx: HLD, "heart murmur", chronic back pain, thyroid nodules\par OBGYN hx: does not have children. Currently post menopause\par Sx hx: back sx, hand sx, cataract, hip replacement, removal of benign parotid gland tumor.\par Fam hx: M: breast CA, F: PPM\par Social hx: lives in [place], with twin sister, . quit tob 3/2020 (30 pk yr hx), drinks wine a couple times a week. denies drug use. \par Meds: lipitor 80 protonix bid, cimetidine, asa, toprol 50. lisinopril-HCTZ 20-12.5 glimiperide 1 mg bid, albuterol inhaler.\par Allergies: neosporin, bacitracin (rash). \par

## 2022-03-18 NOTE — DISCUSSION/SUMMARY
[FreeTextEntry1] : 66F with CAD/PCI to RCA, HLD, presents for f/u\par \par 1. CAD\par -s/p PCI to RCA 11/2020\par -pt feeling well, denies cp\par -cont asa lipitor 80 toprol 50\par 2. carotid plaque \par -cont asa, statin\par -asymptomatic, will monitor\par \par 3. HTN\par -controlled on current regimen of lopressor, lisinopril and hctz\par \par f/u 6 months or sooner if needed. \par \par >40 min spent on encounter\par

## 2022-03-24 ENCOUNTER — RX RENEWAL (OUTPATIENT)
Age: 67
End: 2022-03-24

## 2022-04-11 ENCOUNTER — NON-APPOINTMENT (OUTPATIENT)
Age: 67
End: 2022-04-11

## 2022-04-21 ENCOUNTER — RX RENEWAL (OUTPATIENT)
Age: 67
End: 2022-04-21

## 2022-04-29 ENCOUNTER — APPOINTMENT (OUTPATIENT)
Dept: INTERNAL MEDICINE | Facility: CLINIC | Age: 67
End: 2022-04-29
Payer: MEDICARE

## 2022-04-29 ENCOUNTER — NON-APPOINTMENT (OUTPATIENT)
Age: 67
End: 2022-04-29

## 2022-04-29 VITALS
HEART RATE: 74 BPM | SYSTOLIC BLOOD PRESSURE: 134 MMHG | WEIGHT: 190 LBS | TEMPERATURE: 98 F | RESPIRATION RATE: 16 BRPM | BODY MASS INDEX: 28.14 KG/M2 | OXYGEN SATURATION: 99 % | HEIGHT: 69 IN | DIASTOLIC BLOOD PRESSURE: 80 MMHG

## 2022-04-29 DIAGNOSIS — Z92.29 PERSONAL HISTORY OF OTHER DRUG THERAPY: ICD-10-CM

## 2022-04-29 DIAGNOSIS — Z87.898 PERSONAL HISTORY OF OTHER SPECIFIED CONDITIONS: ICD-10-CM

## 2022-04-29 DIAGNOSIS — Z86.19 PERSONAL HISTORY OF OTHER INFECTIOUS AND PARASITIC DISEASES: ICD-10-CM

## 2022-04-29 DIAGNOSIS — Z87.09 PERSONAL HISTORY OF OTHER DISEASES OF THE RESPIRATORY SYSTEM: ICD-10-CM

## 2022-04-29 LAB
ALBUMIN SERPL ELPH-MCNC: 4.5 G/DL
ALP BLD-CCNC: 150 U/L
ALT SERPL-CCNC: 94 U/L
ANION GAP SERPL CALC-SCNC: 15 MMOL/L
APPEARANCE: CLEAR
APTT BLD: 29.1 SEC
AST SERPL-CCNC: 44 U/L
BASOPHILS # BLD AUTO: 0.03 K/UL
BASOPHILS NFR BLD AUTO: 0.4 %
BILIRUB SERPL-MCNC: 0.4 MG/DL
BILIRUBIN URINE: NEGATIVE
BLOOD URINE: NEGATIVE
BUN SERPL-MCNC: 15 MG/DL
CALCIUM SERPL-MCNC: 9.7 MG/DL
CHLORIDE SERPL-SCNC: 101 MMOL/L
CHOLEST SERPL-MCNC: 202 MG/DL
CO2 SERPL-SCNC: 22 MMOL/L
COLOR: NORMAL
COVID-19 SPIKE DOMAIN ANTIBODY INTERPRETATION: POSITIVE
CREAT SERPL-MCNC: 0.58 MG/DL
EGFR: 100 ML/MIN/1.73M2
EOSINOPHIL # BLD AUTO: 0.21 K/UL
EOSINOPHIL NFR BLD AUTO: 3.1 %
ESTIMATED AVERAGE GLUCOSE: 166 MG/DL
GLUCOSE QUALITATIVE U: NEGATIVE
GLUCOSE SERPL-MCNC: 159 MG/DL
HBA1C MFR BLD HPLC: 7.4 %
HCT VFR BLD CALC: 38.5 %
HDLC SERPL-MCNC: 57 MG/DL
HGB BLD-MCNC: 13 G/DL
IMM GRANULOCYTES NFR BLD AUTO: 0.4 %
INR PPP: 0.9 RATIO
KETONES URINE: NEGATIVE
LDLC SERPL CALC-MCNC: 117 MG/DL
LEUKOCYTE ESTERASE URINE: NEGATIVE
LYMPHOCYTES # BLD AUTO: 1.3 K/UL
LYMPHOCYTES NFR BLD AUTO: 19.4 %
MAN DIFF?: NORMAL
MCHC RBC-ENTMCNC: 31 PG
MCHC RBC-ENTMCNC: 33.8 GM/DL
MCV RBC AUTO: 91.9 FL
MONOCYTES # BLD AUTO: 0.78 K/UL
MONOCYTES NFR BLD AUTO: 11.7 %
NEUTROPHILS # BLD AUTO: 4.34 K/UL
NEUTROPHILS NFR BLD AUTO: 65 %
NITRITE URINE: NEGATIVE
NONHDLC SERPL-MCNC: 145 MG/DL
PH URINE: 6.5
PLATELET # BLD AUTO: 300 K/UL
POTASSIUM SERPL-SCNC: 4.7 MMOL/L
PROT SERPL-MCNC: 6.8 G/DL
PROTEIN URINE: NEGATIVE
PT BLD: 10.6 SEC
RBC # BLD: 4.19 M/UL
RBC # FLD: 12.1 %
SARS-COV-2 AB SERPL IA-ACNC: >250 U/ML
SODIUM SERPL-SCNC: 138 MMOL/L
SPECIFIC GRAVITY URINE: 1.01
TRIGL SERPL-MCNC: 137 MG/DL
UROBILINOGEN URINE: NORMAL
WBC # FLD AUTO: 6.69 K/UL

## 2022-04-29 PROCEDURE — 99215 OFFICE O/P EST HI 40 MIN: CPT | Mod: 25

## 2022-04-29 PROCEDURE — 93000 ELECTROCARDIOGRAM COMPLETE: CPT

## 2022-04-29 PROCEDURE — 36415 COLL VENOUS BLD VENIPUNCTURE: CPT

## 2022-04-29 RX ORDER — ASPIRIN ENTERIC COATED TABLETS 81 MG 81 MG/1
81 TABLET, DELAYED RELEASE ORAL
Refills: 0 | Status: DISCONTINUED | COMMUNITY
End: 2022-04-29

## 2022-04-29 RX ORDER — TRAMADOL HYDROCHLORIDE 50 MG/1
50 TABLET, COATED ORAL AT BEDTIME
Qty: 30 | Refills: 0 | Status: DISCONTINUED | COMMUNITY
Start: 2022-02-03 | End: 2022-04-29

## 2022-04-29 RX ORDER — AMOXICILLIN 500 MG/1
500 TABLET, FILM COATED ORAL 3 TIMES DAILY
Qty: 21 | Refills: 0 | Status: DISCONTINUED | COMMUNITY
Start: 2022-03-14 | End: 2022-04-29

## 2022-05-02 LAB
COVID-19 NUCLEOCAPSID  GAM ANTIBODY INTERPRETATION: NEGATIVE
SARS-COV-2 AB SERPL QL IA: 0.08 INDEX

## 2022-06-07 ENCOUNTER — APPOINTMENT (OUTPATIENT)
Dept: INTERNAL MEDICINE | Facility: CLINIC | Age: 67
End: 2022-06-07
Payer: MEDICARE

## 2022-06-07 DIAGNOSIS — Z01.818 ENCOUNTER FOR OTHER PREPROCEDURAL EXAMINATION: ICD-10-CM

## 2022-06-07 PROCEDURE — 99214 OFFICE O/P EST MOD 30 MIN: CPT | Mod: 95

## 2022-06-08 PROBLEM — Z01.818 PREOP EXAMINATION: Status: RESOLVED | Noted: 2019-06-18 | Resolved: 2022-06-08

## 2022-06-16 ENCOUNTER — APPOINTMENT (OUTPATIENT)
Dept: OTOLARYNGOLOGY | Facility: CLINIC | Age: 67
End: 2022-06-16

## 2022-07-19 ENCOUNTER — NON-APPOINTMENT (OUTPATIENT)
Age: 67
End: 2022-07-19

## 2022-07-19 ENCOUNTER — RX RENEWAL (OUTPATIENT)
Age: 67
End: 2022-07-19

## 2022-07-29 ENCOUNTER — APPOINTMENT (OUTPATIENT)
Dept: DERMATOLOGY | Facility: CLINIC | Age: 67
End: 2022-07-29

## 2022-08-05 ENCOUNTER — APPOINTMENT (OUTPATIENT)
Dept: INTERNAL MEDICINE | Facility: CLINIC | Age: 67
End: 2022-08-05

## 2022-08-05 ENCOUNTER — RESULT REVIEW (OUTPATIENT)
Age: 67
End: 2022-08-05

## 2022-08-05 VITALS
TEMPERATURE: 98 F | WEIGHT: 193.13 LBS | HEART RATE: 77 BPM | RESPIRATION RATE: 16 BRPM | BODY MASS INDEX: 28.6 KG/M2 | OXYGEN SATURATION: 98 % | SYSTOLIC BLOOD PRESSURE: 163 MMHG | HEIGHT: 69 IN | DIASTOLIC BLOOD PRESSURE: 80 MMHG

## 2022-08-05 VITALS — SYSTOLIC BLOOD PRESSURE: 140 MMHG | DIASTOLIC BLOOD PRESSURE: 78 MMHG

## 2022-08-05 DIAGNOSIS — H02.33 BLEPHAROCHALASIS RIGHT EYE, UNSPECIFIED EYELID: ICD-10-CM

## 2022-08-05 DIAGNOSIS — R92.2 INCONCLUSIVE MAMMOGRAM: ICD-10-CM

## 2022-08-05 DIAGNOSIS — R06.02 SHORTNESS OF BREATH: ICD-10-CM

## 2022-08-05 DIAGNOSIS — Z91.81 HISTORY OF FALLING: ICD-10-CM

## 2022-08-05 DIAGNOSIS — Z78.0 ASYMPTOMATIC MENOPAUSAL STATE: ICD-10-CM

## 2022-08-05 DIAGNOSIS — Z72.821 INADEQUATE SLEEP HYGIENE: ICD-10-CM

## 2022-08-05 DIAGNOSIS — R26.89 OTHER ABNORMALITIES OF GAIT AND MOBILITY: ICD-10-CM

## 2022-08-05 DIAGNOSIS — M51.26 OTHER INTERVERTEBRAL DISC DISPLACEMENT, LUMBAR REGION: ICD-10-CM

## 2022-08-05 DIAGNOSIS — M54.50 LOW BACK PAIN, UNSPECIFIED: ICD-10-CM

## 2022-08-05 DIAGNOSIS — Z11.59 ENCOUNTER FOR SCREENING FOR OTHER VIRAL DISEASES: ICD-10-CM

## 2022-08-05 DIAGNOSIS — H02.36 BLEPHAROCHALASIS RIGHT EYE, UNSPECIFIED EYELID: ICD-10-CM

## 2022-08-05 DIAGNOSIS — R01.1 CARDIAC MURMUR, UNSPECIFIED: ICD-10-CM

## 2022-08-05 DIAGNOSIS — M53.3 SACROCOCCYGEAL DISORDERS, NOT ELSEWHERE CLASSIFIED: ICD-10-CM

## 2022-08-05 DIAGNOSIS — F43.21 ADJUSTMENT DISORDER WITH DEPRESSED MOOD: ICD-10-CM

## 2022-08-05 DIAGNOSIS — M16.9 OSTEOARTHRITIS OF HIP, UNSPECIFIED: ICD-10-CM

## 2022-08-05 LAB
DATE COLLECTED: NORMAL
HEMOCCULT SP1 STL QL: NEGATIVE

## 2022-08-05 PROCEDURE — G0438: CPT

## 2022-08-05 PROCEDURE — 82270 OCCULT BLOOD FECES: CPT

## 2022-08-05 PROCEDURE — 90732 PPSV23 VACC 2 YRS+ SUBQ/IM: CPT

## 2022-08-05 PROCEDURE — G0009: CPT

## 2022-08-05 PROCEDURE — G0296 VISIT TO DETERM LDCT ELIG: CPT

## 2022-08-05 PROCEDURE — 36415 COLL VENOUS BLD VENIPUNCTURE: CPT

## 2022-08-05 PROCEDURE — 99497 ADVNCD CARE PLAN 30 MIN: CPT

## 2022-08-05 RX ORDER — METOPROLOL SUCCINATE 50 MG/1
50 TABLET, EXTENDED RELEASE ORAL DAILY
Qty: 90 | Refills: 1 | Status: DISCONTINUED | COMMUNITY
Start: 2021-11-19 | End: 2022-08-05

## 2022-08-08 LAB
25(OH)D3 SERPL-MCNC: 28 NG/ML
ALBUMIN SERPL ELPH-MCNC: 4.5 G/DL
ALP BLD-CCNC: 139 U/L
ALT SERPL-CCNC: 73 U/L
ANION GAP SERPL CALC-SCNC: 15 MMOL/L
APPEARANCE: CLEAR
AST SERPL-CCNC: 29 U/L
BASOPHILS # BLD AUTO: 0.04 K/UL
BASOPHILS NFR BLD AUTO: 0.6 %
BILIRUB SERPL-MCNC: 0.4 MG/DL
BILIRUBIN URINE: NEGATIVE
BLOOD URINE: NEGATIVE
BUN SERPL-MCNC: 21 MG/DL
CALCIUM SERPL-MCNC: 10 MG/DL
CHLORIDE SERPL-SCNC: 100 MMOL/L
CHOLEST SERPL-MCNC: 221 MG/DL
CO2 SERPL-SCNC: 20 MMOL/L
COLOR: NORMAL
CREAT SERPL-MCNC: 0.62 MG/DL
CREAT SPEC-SCNC: 37 MG/DL
EGFR: 98 ML/MIN/1.73M2
EOSINOPHIL # BLD AUTO: 0.22 K/UL
EOSINOPHIL NFR BLD AUTO: 3.3 %
ESTIMATED AVERAGE GLUCOSE: 223 MG/DL
GLUCOSE QUALITATIVE U: NEGATIVE
GLUCOSE SERPL-MCNC: 284 MG/DL
HBA1C MFR BLD HPLC: 9.4 %
HCT VFR BLD CALC: 36.3 %
HDLC SERPL-MCNC: 52 MG/DL
HGB BLD-MCNC: 12.4 G/DL
IMM GRANULOCYTES NFR BLD AUTO: 0.3 %
KETONES URINE: NEGATIVE
LDLC SERPL CALC-MCNC: 121 MG/DL
LEUKOCYTE ESTERASE URINE: NEGATIVE
LYMPHOCYTES # BLD AUTO: 1.39 K/UL
LYMPHOCYTES NFR BLD AUTO: 20.7 %
MAN DIFF?: NORMAL
MCHC RBC-ENTMCNC: 31.7 PG
MCHC RBC-ENTMCNC: 34.2 GM/DL
MCV RBC AUTO: 92.8 FL
MICROALBUMIN 24H UR DL<=1MG/L-MCNC: <1.2 MG/DL
MICROALBUMIN/CREAT 24H UR-RTO: NORMAL MG/G
MONOCYTES # BLD AUTO: 0.72 K/UL
MONOCYTES NFR BLD AUTO: 10.7 %
NEUTROPHILS # BLD AUTO: 4.31 K/UL
NEUTROPHILS NFR BLD AUTO: 64.4 %
NITRITE URINE: NEGATIVE
NONHDLC SERPL-MCNC: 168 MG/DL
PH URINE: 6
PLATELET # BLD AUTO: 316 K/UL
POTASSIUM SERPL-SCNC: 4.4 MMOL/L
PROT SERPL-MCNC: 6.8 G/DL
PROTEIN URINE: NEGATIVE
RBC # BLD: 3.91 M/UL
RBC # FLD: 11.9 %
SODIUM SERPL-SCNC: 136 MMOL/L
SPECIFIC GRAVITY URINE: 1.01
TRIGL SERPL-MCNC: 236 MG/DL
TSH SERPL-ACNC: 2.4 UIU/ML
UROBILINOGEN URINE: NORMAL
WBC # FLD AUTO: 6.7 K/UL

## 2022-08-09 ENCOUNTER — EMERGENCY (EMERGENCY)
Facility: HOSPITAL | Age: 67
LOS: 1 days | Discharge: HOME | End: 2022-08-09
Attending: EMERGENCY MEDICINE | Admitting: EMERGENCY MEDICINE

## 2022-08-09 ENCOUNTER — APPOINTMENT (OUTPATIENT)
Dept: CARDIOLOGY | Facility: CLINIC | Age: 67
End: 2022-08-09

## 2022-08-09 ENCOUNTER — NON-APPOINTMENT (OUTPATIENT)
Age: 67
End: 2022-08-09

## 2022-08-09 VITALS
RESPIRATION RATE: 16 BRPM | HEART RATE: 84 BPM | HEIGHT: 69 IN | DIASTOLIC BLOOD PRESSURE: 75 MMHG | OXYGEN SATURATION: 99 % | SYSTOLIC BLOOD PRESSURE: 191 MMHG | TEMPERATURE: 97 F

## 2022-08-09 VITALS — DIASTOLIC BLOOD PRESSURE: 70 MMHG | SYSTOLIC BLOOD PRESSURE: 142 MMHG

## 2022-08-09 VITALS
WEIGHT: 194 LBS | TEMPERATURE: 97.9 F | SYSTOLIC BLOOD PRESSURE: 142 MMHG | OXYGEN SATURATION: 99 % | BODY MASS INDEX: 28.65 KG/M2 | HEART RATE: 64 BPM | DIASTOLIC BLOOD PRESSURE: 72 MMHG

## 2022-08-09 DIAGNOSIS — D11.0 BENIGN NEOPLASM OF PAROTID GLAND: Chronic | ICD-10-CM

## 2022-08-09 DIAGNOSIS — Z98.890 OTHER SPECIFIED POSTPROCEDURAL STATES: Chronic | ICD-10-CM

## 2022-08-09 DIAGNOSIS — Z98.41 CATARACT EXTRACTION STATUS, RIGHT EYE: Chronic | ICD-10-CM

## 2022-08-09 DIAGNOSIS — R20.0 ANESTHESIA OF SKIN: ICD-10-CM

## 2022-08-09 DIAGNOSIS — Z96.642 PRESENCE OF LEFT ARTIFICIAL HIP JOINT: Chronic | ICD-10-CM

## 2022-08-09 DIAGNOSIS — R20.2 ANESTHESIA OF SKIN: ICD-10-CM

## 2022-08-09 DIAGNOSIS — Z96.641 PRESENCE OF RIGHT ARTIFICIAL HIP JOINT: Chronic | ICD-10-CM

## 2022-08-09 LAB
ALBUMIN SERPL ELPH-MCNC: 4.7 G/DL — SIGNIFICANT CHANGE UP (ref 3.3–5)
ALP SERPL-CCNC: 160 U/L — HIGH (ref 40–120)
ALT FLD-CCNC: 85 U/L — HIGH (ref 4–33)
ANION GAP SERPL CALC-SCNC: 13 MMOL/L — SIGNIFICANT CHANGE UP (ref 7–14)
APTT BLD: 33.1 SEC — SIGNIFICANT CHANGE UP (ref 27–36.3)
AST SERPL-CCNC: 45 U/L — HIGH (ref 4–32)
BASE EXCESS BLDV CALC-SCNC: -0.8 MMOL/L — SIGNIFICANT CHANGE UP (ref -2–3)
BASOPHILS # BLD AUTO: 0.04 K/UL — SIGNIFICANT CHANGE UP (ref 0–0.2)
BASOPHILS NFR BLD AUTO: 0.6 % — SIGNIFICANT CHANGE UP (ref 0–2)
BILIRUB SERPL-MCNC: 0.4 MG/DL — SIGNIFICANT CHANGE UP (ref 0.2–1.2)
BLOOD GAS VENOUS COMPREHENSIVE RESULT: SIGNIFICANT CHANGE UP
BUN SERPL-MCNC: 26 MG/DL — HIGH (ref 7–23)
CALCIUM SERPL-MCNC: 9.8 MG/DL — SIGNIFICANT CHANGE UP (ref 8.4–10.5)
CHLORIDE BLDV-SCNC: 102 MMOL/L — SIGNIFICANT CHANGE UP (ref 96–108)
CHLORIDE SERPL-SCNC: 100 MMOL/L — SIGNIFICANT CHANGE UP (ref 98–107)
CO2 BLDV-SCNC: 26.3 MMOL/L — HIGH (ref 22–26)
CO2 SERPL-SCNC: 22 MMOL/L — SIGNIFICANT CHANGE UP (ref 22–31)
CREAT SERPL-MCNC: 0.73 MG/DL — SIGNIFICANT CHANGE UP (ref 0.5–1.3)
EGFR: 91 ML/MIN/1.73M2 — SIGNIFICANT CHANGE UP
EOSINOPHIL # BLD AUTO: 0.22 K/UL — SIGNIFICANT CHANGE UP (ref 0–0.5)
EOSINOPHIL NFR BLD AUTO: 3.2 % — SIGNIFICANT CHANGE UP (ref 0–6)
FLUAV AG NPH QL: SIGNIFICANT CHANGE UP
FLUBV AG NPH QL: SIGNIFICANT CHANGE UP
GAS PNL BLDV: 135 MMOL/L — LOW (ref 136–145)
GLUCOSE BLDV-MCNC: 199 MG/DL — HIGH (ref 70–99)
GLUCOSE SERPL-MCNC: 214 MG/DL — HIGH (ref 70–99)
HCO3 BLDV-SCNC: 25 MMOL/L — SIGNIFICANT CHANGE UP (ref 22–29)
HCT VFR BLD CALC: 37.6 % — SIGNIFICANT CHANGE UP (ref 34.5–45)
HCT VFR BLDA CALC: 40 % — SIGNIFICANT CHANGE UP (ref 34.5–46.5)
HGB BLD CALC-MCNC: 13.2 G/DL — SIGNIFICANT CHANGE UP (ref 11.5–15.5)
HGB BLD-MCNC: 13.2 G/DL — SIGNIFICANT CHANGE UP (ref 11.5–15.5)
IANC: 3.87 K/UL — SIGNIFICANT CHANGE UP (ref 1.8–7.4)
IMM GRANULOCYTES NFR BLD AUTO: 0.4 % — SIGNIFICANT CHANGE UP (ref 0–1.5)
INR BLD: 0.92 RATIO — SIGNIFICANT CHANGE UP (ref 0.88–1.16)
LACTATE BLDV-MCNC: 1.3 MMOL/L — SIGNIFICANT CHANGE UP (ref 0.5–2)
LYMPHOCYTES # BLD AUTO: 1.84 K/UL — SIGNIFICANT CHANGE UP (ref 1–3.3)
LYMPHOCYTES # BLD AUTO: 26.8 % — SIGNIFICANT CHANGE UP (ref 13–44)
MCHC RBC-ENTMCNC: 31.3 PG — SIGNIFICANT CHANGE UP (ref 27–34)
MCHC RBC-ENTMCNC: 35.1 GM/DL — SIGNIFICANT CHANGE UP (ref 32–36)
MCV RBC AUTO: 89.1 FL — SIGNIFICANT CHANGE UP (ref 80–100)
MONOCYTES # BLD AUTO: 0.87 K/UL — SIGNIFICANT CHANGE UP (ref 0–0.9)
MONOCYTES NFR BLD AUTO: 12.7 % — SIGNIFICANT CHANGE UP (ref 2–14)
NEUTROPHILS # BLD AUTO: 3.87 K/UL — SIGNIFICANT CHANGE UP (ref 1.8–7.4)
NEUTROPHILS NFR BLD AUTO: 56.3 % — SIGNIFICANT CHANGE UP (ref 43–77)
NRBC # BLD: 0 /100 WBCS — SIGNIFICANT CHANGE UP
NRBC # FLD: 0 K/UL — SIGNIFICANT CHANGE UP
PCO2 BLDV: 44 MMHG — HIGH (ref 39–42)
PH BLDV: 7.36 — SIGNIFICANT CHANGE UP (ref 7.32–7.43)
PLATELET # BLD AUTO: 293 K/UL — SIGNIFICANT CHANGE UP (ref 150–400)
PO2 BLDV: 37 MMHG — SIGNIFICANT CHANGE UP
POTASSIUM BLDV-SCNC: 3.8 MMOL/L — SIGNIFICANT CHANGE UP (ref 3.5–5.1)
POTASSIUM SERPL-MCNC: 3.8 MMOL/L — SIGNIFICANT CHANGE UP (ref 3.5–5.3)
POTASSIUM SERPL-SCNC: 3.8 MMOL/L — SIGNIFICANT CHANGE UP (ref 3.5–5.3)
PROT SERPL-MCNC: 7.5 G/DL — SIGNIFICANT CHANGE UP (ref 6–8.3)
PROTHROM AB SERPL-ACNC: 10.7 SEC — SIGNIFICANT CHANGE UP (ref 10.5–13.4)
RBC # BLD: 4.22 M/UL — SIGNIFICANT CHANGE UP (ref 3.8–5.2)
RBC # FLD: 11.7 % — SIGNIFICANT CHANGE UP (ref 10.3–14.5)
RSV RNA NPH QL NAA+NON-PROBE: SIGNIFICANT CHANGE UP
SAO2 % BLDV: 62.2 % — SIGNIFICANT CHANGE UP
SARS-COV-2 RNA SPEC QL NAA+PROBE: SIGNIFICANT CHANGE UP
SODIUM SERPL-SCNC: 135 MMOL/L — SIGNIFICANT CHANGE UP (ref 135–145)
TROPONIN T, HIGH SENSITIVITY RESULT: 31 NG/L — SIGNIFICANT CHANGE UP
WBC # BLD: 6.87 K/UL — SIGNIFICANT CHANGE UP (ref 3.8–10.5)
WBC # FLD AUTO: 6.87 K/UL — SIGNIFICANT CHANGE UP (ref 3.8–10.5)

## 2022-08-09 PROCEDURE — 99215 OFFICE O/P EST HI 40 MIN: CPT | Mod: 25

## 2022-08-09 PROCEDURE — 70498 CT ANGIOGRAPHY NECK: CPT | Mod: 26,MA

## 2022-08-09 PROCEDURE — 99220: CPT | Mod: 25

## 2022-08-09 PROCEDURE — 93000 ELECTROCARDIOGRAM COMPLETE: CPT

## 2022-08-09 PROCEDURE — 93010 ELECTROCARDIOGRAM REPORT: CPT

## 2022-08-09 PROCEDURE — 70496 CT ANGIOGRAPHY HEAD: CPT | Mod: 26,MA

## 2022-08-09 RX ORDER — METOPROLOL TARTRATE 50 MG
25 TABLET ORAL
Refills: 0 | Status: DISCONTINUED | OUTPATIENT
Start: 2022-08-09 | End: 2022-08-13

## 2022-08-09 RX ORDER — ATORVASTATIN CALCIUM 80 MG/1
80 TABLET, FILM COATED ORAL AT BEDTIME
Refills: 0 | Status: DISCONTINUED | OUTPATIENT
Start: 2022-08-09 | End: 2022-08-13

## 2022-08-09 RX ORDER — SODIUM CHLORIDE 9 MG/ML
1000 INJECTION, SOLUTION INTRAVENOUS
Refills: 0 | Status: DISCONTINUED | OUTPATIENT
Start: 2022-08-09 | End: 2022-08-13

## 2022-08-09 RX ORDER — DEXTROSE 50 % IN WATER 50 %
15 SYRINGE (ML) INTRAVENOUS ONCE
Refills: 0 | Status: DISCONTINUED | OUTPATIENT
Start: 2022-08-09 | End: 2022-08-13

## 2022-08-09 RX ORDER — INSULIN LISPRO 100/ML
VIAL (ML) SUBCUTANEOUS
Refills: 0 | Status: DISCONTINUED | OUTPATIENT
Start: 2022-08-09 | End: 2022-08-13

## 2022-08-09 RX ORDER — LISINOPRIL 2.5 MG/1
20 TABLET ORAL DAILY
Refills: 0 | Status: DISCONTINUED | OUTPATIENT
Start: 2022-08-09 | End: 2022-08-13

## 2022-08-09 RX ORDER — CLOPIDOGREL BISULFATE 75 MG/1
75 TABLET, FILM COATED ORAL DAILY
Refills: 0 | Status: DISCONTINUED | OUTPATIENT
Start: 2022-08-09 | End: 2022-08-13

## 2022-08-09 RX ORDER — DEXTROSE 50 % IN WATER 50 %
25 SYRINGE (ML) INTRAVENOUS ONCE
Refills: 0 | Status: DISCONTINUED | OUTPATIENT
Start: 2022-08-09 | End: 2022-08-13

## 2022-08-09 RX ORDER — INSULIN LISPRO 100/ML
VIAL (ML) SUBCUTANEOUS AT BEDTIME
Refills: 0 | Status: DISCONTINUED | OUTPATIENT
Start: 2022-08-09 | End: 2022-08-13

## 2022-08-09 RX ORDER — DEXTROSE 50 % IN WATER 50 %
12.5 SYRINGE (ML) INTRAVENOUS ONCE
Refills: 0 | Status: DISCONTINUED | OUTPATIENT
Start: 2022-08-09 | End: 2022-08-13

## 2022-08-09 RX ORDER — GLUCAGON INJECTION, SOLUTION 0.5 MG/.1ML
1 INJECTION, SOLUTION SUBCUTANEOUS ONCE
Refills: 0 | Status: DISCONTINUED | OUTPATIENT
Start: 2022-08-09 | End: 2022-08-13

## 2022-08-09 RX ORDER — HYDROCHLOROTHIAZIDE 25 MG
12.5 TABLET ORAL DAILY
Refills: 0 | Status: DISCONTINUED | OUTPATIENT
Start: 2022-08-09 | End: 2022-08-13

## 2022-08-09 RX ORDER — METOPROLOL TARTRATE 50 MG
25 TABLET ORAL ONCE
Refills: 0 | Status: COMPLETED | OUTPATIENT
Start: 2022-08-09 | End: 2022-08-09

## 2022-08-09 RX ORDER — CLOPIDOGREL BISULFATE 75 MG/1
75 TABLET, FILM COATED ORAL ONCE
Refills: 0 | Status: COMPLETED | OUTPATIENT
Start: 2022-08-09 | End: 2022-08-09

## 2022-08-09 RX ADMIN — CLOPIDOGREL BISULFATE 75 MILLIGRAM(S): 75 TABLET, FILM COATED ORAL at 22:53

## 2022-08-09 RX ADMIN — Medication 25 MILLIGRAM(S): at 22:53

## 2022-08-09 RX ADMIN — ATORVASTATIN CALCIUM 80 MILLIGRAM(S): 80 TABLET, FILM COATED ORAL at 22:53

## 2022-08-09 NOTE — ED CDU PROVIDER INITIAL DAY NOTE - OBJECTIVE STATEMENT
66 yo F, hx of CAD s/p stent placement 2019 on Plavix, HLD, HTN, T2DM, presenting w/ LUE weakness and numbness. Last known well time was yesterday night at ~2200. The patient woke up with her symptoms. No associated changes in voice, vision changes. Was seen by her cardiologist this AM, Dr. Ezio Sharma, this AM and referred here. No recent changes to medications. NKDA. Remote hx of small meningioma.  CT, CTA imaging negative. Labs unremarkable. Pt seen by Neurology, recommending MR brain, c-spine, brachial plexus. pt well appearing with no new complaints. Plan to observe in CDU for MRI imaging.

## 2022-08-09 NOTE — ED ADULT NURSE NOTE - NSICDXFAMILYHX_GEN_ALL_CORE_FT
FAMILY HISTORY:  Father  Still living? No  Congestive heart failure, Age at diagnosis: Age Unknown

## 2022-08-09 NOTE — HISTORY OF PRESENT ILLNESS
[FreeTextEntry1] : 67F with HLD, CAD/PCI presents for f/u\par PMD Dr Concetta Valdivia/Dr Lola Flores.\par pulm: Dr Tea Zhu\par \par previously, \par pt initially with ENC[facility]AS 9/2020, echo done at that time revealed grossly normal LV systolic function, mild LVH, MAC and AV calcification with normal opening. Carotid studies revealed b/l 16-49% stenosis. pt was sent for a nuclear stress test which revealed medium sized moderate defects in the distal inferior, inferolateral walls that were reversible. and pt was sent for cardiac cath (11/2020) where she Recieved a 3mm x 24mm JAE to a 90% RCA lesion. TTE did reveal increased RV wall thickness, pt had cMRI, which showed no evidence of myocarditis, cardiomyopathy, or ARVD. She also had a pulm eval with a CT chest without evidence of parenchymal lung dz, no PE, and PFTs showing mild restrictive pattern and pt was placed on albuterol inhalers. \par \par pt later seen in cardiology 11/21. at that time pt overall feeling well, still with occasional cp, unchanged from her prev symptoms but she is "able to handle it" pt with significant stress at work, unable to tolerate her boss, stated she felt like she was "not allowed" to quit. bp cont to be elevated, on lopressor 25 bid, and lisinopril 20, and about to start hctz 12.5. \par pt last seen 3/22, feeling well. \par \par pt now presents for follow up.\par today,\par \par \par pt states she woke up in the middle of the night, with L arm numbness, like it "fell asleep", pt states she thought it would go away but it still hasn’t. no pain. pins and needles sensation. pt also endorses a heaviness, some weakness.\par \par pt denies LE involvement, facial involvement\par \par no cp sob, \par \par labs: lipid panel elevated, a1c elevated. \par \par \par \par \par pt overall feeling well. denies cp or sob, at rest or on exertion. denies palpitations, dizziness, or syncope. Now, only endorsing chronic back pain.\par Pt states she started working from home several days a week which has significantly decreased her stress level. \par \par pt not checking bp at home, but states full compliance with her meds, pt on lopressor, lisinopril-hctz\par \par \par CAD\par s/p PCI on asa, plavix, stent was 11/2020. \par \par pt denies dizziness, syncope. pt denies LE edema or orthopnea. \par \par recent labs 8/14/2020: tot chol 205 tg 102 hdl 79 ldl 105 Cr 0.58 a1c 6.3\par \par pt received covid vaccine (pfizer)\par \par Med hx: HLD, "heart murmur", chronic back pain, thyroid nodules\par OBGYN hx: does not have children. Currently post menopause\par Sx hx: back sx, hand sx, cataract, hip replacement, removal of benign parotid gland tumor.\par Fam hx: M: breast CA, F: PPM\par Social hx: lives in [place], with twin sister, . quit tob 3/2020 (30 pk yr hx), drinks wine a couple times a week. denies drug use. \par Meds: lipitor 80 protonix bid, cimetidine, asa, toprol 50. lisinopril-HCTZ 20-12.5 glimiperide 1 mg bid, albuterol inhaler.\par Allergies: neosporin, bacitracin (rash). \par \par

## 2022-08-09 NOTE — ED PROVIDER NOTE - OBJECTIVE STATEMENT
68 yo F, hx of CAD s/p stent placement 2019 on Plavic, HLD, HTN, T2DM, presenting w/ LUE weakness and numbness. Last known well time was yesterday night at ~2200. The patient woke up with her symptoms. No associated changes in voice, vision changes. Was seen by her cardiologist this AM, Dr. Ezio Sharma, this AM and referred. 68 yo F, hx of CAD s/p stent placement 2019 on Plavix, HLD, HTN, T2DM, presenting w/ LUE weakness and numbness. Last known well time was yesterday night at ~2200. The patient woke up with her symptoms. No associated changes in voice, vision changes. Was seen by her cardiologist this AM, Dr. Ezio Sharma, this AM and referred here. No recent changes to medications. NKDA. Remote hx of small meningioma.

## 2022-08-09 NOTE — ED PROVIDER NOTE - PROGRESS NOTE DETAILS
Norbert, PGY2 - Received sign-out on patient. Introduced myself and updated patient on the medical evaluation process. Patient aware and in agreement. Patient states that she is on aspirin, not plavix. Neurology Jair was consulted, came to see patient, recommending CDU for MRI. Plavix ordered per neuro. CDU Howie called, will come see patient. Norbert, PGY2 - CDU accepting patient.

## 2022-08-09 NOTE — ED ADULT TRIAGE NOTE - CHIEF COMPLAINT QUOTE
Pt c/o L arm numbness/tingling constantly since 3AM. Also endorsing L sided chest pain, intermittently. Was seen by PCP today and was told to come to ED for further eval. PMHx CAD w/ stent 2019, HLD, Anxiety, DM Pt c/o L arm numbness/tingling constantly since 3AM. Also endorsing L sided chest pain, intermittently. Was seen by PCP today and was told to come to ED for further eval. PMHx CAD w/ stent 2019, HLD, Anxiety, DM, HTN

## 2022-08-09 NOTE — ED ADULT NURSE NOTE - NSICDXPASTSURGICALHX_GEN_ALL_CORE_FT
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

## 2022-08-09 NOTE — ED PROVIDER NOTE - PHYSICAL EXAMINATION
Gen: NAD, non-toxic appearing  Head: normal appearing  HEENT: normal conjunctiva  Lung: no respiratory distress, speaking in full sentences , ctab     CV: regular rate and rhythm, no murmurs  Abd: soft, non distended, non tender   MSK: no visible deformities  Neuro:   Alert and grossly oriented, following commands  CN II-XII intact  5/5 strength in x4 extremities   Negative pronator drift testing  Intact sensation to light touch in x4 extremities  Finger to Nose testing normal  Skin: No mehran rashes

## 2022-08-09 NOTE — ED CDU PROVIDER INITIAL DAY NOTE - ATTENDING APP SHARED VISIT CONTRIBUTION OF CARE
agree with PA note    my initial ED note  ""66 yo F, hx of CAD s/p stent placement 2019 on Plavix, HLD, HTN, T2DM, presenting w/ LUE weakness and numbness. Last known well time was yesterday night at ~2200. The patient woke up with her symptoms. No associated changes in voice, vision changes. Was seen by her cardiologist this AM, Dr. Ezio Sharma, this AM and referred here. No recent changes to medications. NKDA. Remote hx of small meningioma."    PE: well appearing; VSS; CTAB/L; s1 s2 no m/r/g abd soft/NT/ND ext: no edema Neuro: CNs intact 5/5 motor UE and LE; sensation intact; states subjectively hands feel differently on left than right;     Imp: r/o CVA: CT head, labs, consult neuro, pt is on ASA, no longer on plavix.  "  per neuro to go to CDU for MR brain, cspine, brachial plexus

## 2022-08-09 NOTE — PHYSICAL EXAM
[Well Developed] : well developed [Well Nourished] : well nourished [No Acute Distress] : no acute distress [Normal Venous Pressure] : normal venous pressure [Normal S1, S2] : normal S1, S2 [No Murmur] : no murmur [Clear Lung Fields] : clear lung fields [Good Air Entry] : good air entry [No Respiratory Distress] : no respiratory distress  [Soft] : abdomen soft [Normal Gait] : normal gait [No Edema] : no edema [Alert and Oriented] : alert and oriented [de-identified] : LUE 5/5 strength, sensory intact.  [de-identified] : CN II through XII grossly in tact

## 2022-08-09 NOTE — ED CDU PROVIDER INITIAL DAY NOTE - MEDICAL DECISION MAKING DETAILS
66 yo F, hx of CAD s/p stent placement 2019 on Plavix, HLD, HTN, T2DM, presenting w/ LUE weakness and numbness.  - MR brain, c-spine, brachial plexus  - neuro following  - continue home meds  - cardiac monitoring

## 2022-08-09 NOTE — REVIEW OF SYSTEMS
[Fever] : no fever [Headache] : no headache [Weight Gain (___ Lbs)] : no recent weight gain [Chills] : no chills [Feeling Fatigued] : not feeling fatigued [Weight Loss (___ Lbs)] : no recent weight loss [Blurry Vision] : no blurred vision [Sore Throat] : no sore throat [SOB] : no shortness of breath [Dyspnea on exertion] : not dyspnea during exertion [Chest Discomfort] : no chest discomfort [Lower Ext Edema] : no extremity edema [Palpitations] : no palpitations [Syncope] : no syncope [Cough] : no cough [Wheezing] : no wheezing [Nausea] : no nausea [Vomiting] : no vomiting [Dizziness] : no dizziness [Confusion] : no confusion was observed [Easy Bleeding] : no tendency for easy bleeding [Easy Bruising] : no tendency for easy bruising [de-identified] : LUE paresthesia

## 2022-08-09 NOTE — CONSULT NOTE ADULT - SUBJECTIVE AND OBJECTIVE BOX
HPI:  Sherley Padilla is a 67 year old RH woman with a past medical history of CAD s/p stenting, HTN, HLD, DM, history of carotid stenosis who is presenting for LUE numbness and tingling. At baseline, the patient is ambulatory without assistive devices and is oriented to person, place, time. She went to bed at her usual baseline level of health at [id number] and awoke at 0300 with sensation of LUE numbness and tingling. She noted that the tingling had been a sensation as if her arm fell asleep but had been persistent. She stated that predominantly the sensation was below the elbow to the fingertips but occasionally travelled up to her shoulder. She noted the feeling was circumferential around the arm. She stated that she had not had sensations like this in the past. Also did note that she felt that her L arm felt heavy as compared with the R. Denied dropping items or mishandling items. Did state that for her job she carries a laptop which she hangs over her L shoulder and had been having occasional shoulder and deltoid pain. Denied any neck pain or stiffness. Did note that she had some headaches. Denied visual changes, auditory changes, dysphagia, dysarthria, R sided symptoms, LE symptoms. She had gone to her cardiologist in the the afternoon who had recommended that she present to the ED for further evaluation. Currently stated that her symptoms of the tingling persist.     NIHSS: 1  MRS: 0    REVIEW OF SYSTEMS    A 10-system ROS was performed and is negative except for those items noted above and/or in the HPI.    PAST MEDICAL & SURGICAL HISTORY:  Osteoarthrosis  hip replacement left     Hyperlipidemia  on meds    Murmur  benign    Rosacea    Anxiety    Benign neoplasm parotid gland  tumor removed  left side     History of total hip replacement, left  2014    History of bilateral cataract extraction    History of hand surgery  basal anthroplasty- left    S/P laminectomy  microdiscectomy L5     History of total hip replacement, right  10/2019    FAMILY HISTORY:  Congestive heart failure (Father)  father  85y/o chf,dm, Mother  88y/o sepsis, 6 siblings      SOCIAL HISTORY:   T/E/D: former smoker    MEDICATIONS (HOME):  Home Medications:  atorvastatin 80 mg oral tablet: 1 tab(s) orally once a day (2020 07:22)  cimetidine 800 mg oral tablet: 1 tab(s) orally once a day (at bedtime) (2020 07:22)  diclofenac sodium 75 mg oral delayed release tablet: 1 tab(s) orally 2 times a day, As Needed (:)  gabapentin 100 mg oral capsule: 1 cap(s) orally every 8 hours, As Needed (:)  pantoprazole 40 mg oral delayed release tablet: 1 tab(s) orally once a day (before a meal) (:)    MEDICATIONS  (STANDING):    MEDICATIONS  (PRN):    ALLERGIES/INTOLERANCES:  Allergies  bacitracin (Pruritus; Rash)  Neosporin (Flushing; Pruritus)    Intolerances    VITALS & EXAMINATION:  Vital Signs Last 24 Hrs  T(C): 36.2 (09 Aug 2022 17:00), Max: 36.2 (09 Aug 2022 17:00)  T(F): 97.1 (09 Aug 2022 17:00), Max: 97.1 (09 Aug 2022 17:00)  HR: 79 (09 Aug 2022 17:47) (79 - 84)  BP: 211/105 (09 Aug 2022 17:47) (191/75 - 211/105)  BP(mean): --  RR: 18 (09 Aug 2022 17:47) (16 - 18)  SpO2: 99% (09 Aug 2022 17:47) (99% - 99%)    Parameters below as of 09 Aug 2022 17:47  Patient On (Oxygen Delivery Method): room air        General:  Constitutional: Appears stated age, in no apparent distress including pain  Head: Normocephalic & atraumatic.  Neck: Spurling test negative    Neurological (>12):  MS: Awake, alert, oriented to person, place, situation, time. Normal affect. Follows all commands.    Language: Speech is clear, fluent.    CNs: PERRL (R = 3mm, L = 3mm). VF intact in all 4 quadrants. EOMI no nystagmus, no diplopia. V1-3 intact to LT, well developed masseter muscles b/l. No facial asymmetry b/l, full eye closure strength b/l. Symmetric palate elevation in midline. Shoulder shrug intact b/l. Tongue midline, normal movements, no atrophy.    Motor: Normal muscle bulk & tone. No noticeable tremor or seizure. No pronator drift. Positive Tinel at the elbow.               Deltoid	Biceps	Triceps	Wrist	Finger ABd	   R	5	5	5	5	5		5 	  L           4+	5	4+	4+	5		4+    	H-Flex	H-Ext	K-Flex	K-Ext	D-Flex	P-Flex  R	5	5	5	5	5	5		   L	5	5	5	5	5	5		     Sensation: Slight sensation of numbness in the distal LUE.     Reflexes:              Biceps(C5)       BR(C6)     Triceps(C7)               Patellar(L4)    Achilles(S1)    Plantar Resp  R	2	          2	             2		        2		    2		Down   L	2	          2	             2		        2		    2		Down     Coordination:  No dysmetria to FTN    Gait: Deferred    LABORATORY:  CBC                       13.2   6.87  )-----------( 293      ( 09 Aug 2022 17:40 )             37.6     Chem     135  |  100  |  26<H>  ----------------------------<  214<H>  3.8   |  22  |  0.73    Ca    9.8      09 Aug 2022 17:40    TPro  7.5  /  Alb  4.7  /  TBili  0.4  /  DBili  x   /  AST  45<H>  /  ALT  85<H>  /  AlkPhos  160<H>      LFTs LIVER FUNCTIONS - ( 09 Aug 2022 17:40 )  Alb: 4.7 g/dL / Pro: 7.5 g/dL / ALK PHOS: 160 U/L / ALT: 85 U/L / AST: 45 U/L / GGT: x           Coagulopathy PT/INR - ( 09 Aug 2022 17:40 )   PT: 10.7 sec;   INR: 0.92 ratio         PTT - ( 09 Aug 2022 17:40 )  PTT:33.1 sec    STUDIES & IMAGING:    Radiology (XR, CT, MR, U/S, TTE/BIBI):    < from: CT Angio Head w/ IV Cont (22 @ 19:08) >  IMPRESSION:    CT brain:  No hydrocephalus, acute intracranial hemorrhage, mass effect, or brain   edema.  No abnormal intracranial enhancement.    CTA brain:  No flow-limiting stenosis or vascular aneurysm. No AVM.    CTA neck:  No flow-limiting stenosis, evidence for arterial dissection, orvascular   aneurysm.    < end of copied text >

## 2022-08-09 NOTE — ED CDU PROVIDER INITIAL DAY NOTE - NS ED ATTENDING STATEMENT MOD
This was a shared visit with the YAKOV. I reviewed and verified the documentation and independently performed the documented:

## 2022-08-09 NOTE — ED ADULT NURSE NOTE - NSICDXPASTMEDICALHX_GEN_ALL_CORE_FT
PAST MEDICAL HISTORY:  Anxiety     Hyperlipidemia on meds    Murmur benign    Osteoarthrosis hip replacement left 2014    Rosacea

## 2022-08-09 NOTE — ED ADULT NURSE REASSESSMENT NOTE - NS ED NURSE REASSESS COMMENT FT1
Pt A&Ox4 resting on stretcher. Respirations even and unlabored, NSR on monitor, sating 100% on RA. pt well appearing, NAD noted. bed in lowest position, side rails up, call bell in hand, safety maintained.

## 2022-08-09 NOTE — ED ADULT NURSE NOTE - OBJECTIVE STATEMENT
pt received to 27, aox4.  pt c/o numbness to left arm since today.  pt has PMH: cardiac stent in 2019.  denies chest pain, dizziness, weakness and lightheadedness.  SL placed, labs sent, report given to primary RN vanessa.  grecia yang

## 2022-08-09 NOTE — ED PROVIDER NOTE - CLINICAL SUMMARY MEDICAL DECISION MAKING FREE TEXT BOX
66 yo F, hx of CAD s/p stent placement 2019 on Plavix, HLD, HTN, T2DM, presenting w/ LUE weakness and numbness. vss. ecg w/ t wave inversions in lateral leads, only old ecg on record from prior evaluation before stent placement, pt seen by cardiologist this AM who did an ECG and was stable per his interpretation, pt w/out chest pain or shortness of breath. pt endorsing weakness, no neurological deficits on exam. r/out gross ischemic pathology, tia, hemorrhage, interval change of known meningioma. intracranial pathology felt to be less likely, suspect peripheral nerve irritation after sleeping on L arm overnight. loaded w/ 324 ASA, cmp, cbc, coags obtained.

## 2022-08-09 NOTE — ED ADULT NURSE NOTE - CHIEF COMPLAINT QUOTE
Pt c/o L arm numbness/tingling constantly since 3AM. Also endorsing L sided chest pain, intermittently. Was seen by PCP today and was told to come to ED for further eval. PMHx CAD w/ stent 2019, HLD, Anxiety, DM, HTN

## 2022-08-09 NOTE — CONSULT NOTE ADULT - ATTENDING COMMENTS
Chronic neck stiffness and mild gait difficulty with leg weakness.    Exam:  Reflexes 3+ throughout, including pec major. + FF reflex.  No Sheron's sign.  B babinski signs.   Motor left triceps 4-  Left delt and infraspinatus ?4+.   HF 4- , B.    No sensory level.     Gait - Slightly slow, wide based, ? not clearly spastic but limited by H/O B hip surgery.     < from: MR Brachial Plexus No Cont, Left (08.10.22 @ 04:40) >    Impression:  1.  Multilevel spondylosis of the cervical spine.  2.  No focal abnormality of the left brachial plexus.  3.  Diffuse atrophy and fatty infiltration of the imaged musculature   without focal edema in a symmetric pattern.    < end of copied text >    < from: MR Head No Cont (08.10.22 @ 04:40) >    IMPRESSION: No acute intracranial hemorrhage or evidence of acute   ischemia.    1 cm calcified meningioma versus dural calcification off the high right   anterior parafalcine location. No surrounding mass effect or edema.    < end of copied text >      A/P   Ms. Padilla is a 68 yo woman with cervical spondylotic myelopathy.   Spine service consult    Incidental meningioma - neurosurgery consult.    D/W patient and CDU MD.    Thank you    Please call us for any further questions.

## 2022-08-09 NOTE — ED PROVIDER NOTE - NS ED ROS FT
GENERAL: no fever  EYES: no eye pain  HEENT: no neck pain  CARDIAC: no chest pain  PULMONARY: no SOB  GI: no abdominal pain  : no dysuria  SKIN: no rashes  NEURO: + LUE numbness, + LUE weakness  MSK: no new joint pain

## 2022-08-09 NOTE — DISCUSSION/SUMMARY
[FreeTextEntry1] : 67F with CAD/PCI to RCA, HLD, presents for f/u\par \par 1. CAD\par -s/p PCI to RCA 11/2020\par -pt feeling well, denies cp\par -cont asa lipitor 80 toprol 50\par \par 2. carotid plaque \par -cont asa, statin\par -asymptomatic, will monitor\par \par 3. HTN\par -controlled on current regimen of lopressor, lisinopril and hctz\par \par 4. LUE numbness\par -motor and sensory intact, bt pt endorsing acute onset symptoms that have not resolved\par -last normal prior to going to bed last night\par -no facial or LE involvement\par -d.w pt that given her multiple risk factors, a cerebral event (TIA/CVA) is a possibility here and she is recommended to go to the ER for further eval\par -pt offered an ambulance but declined, pt advised to have someone drive her, pt agreeable to go to [facility]\par \par \par \par >40 min spent on encounter\par

## 2022-08-09 NOTE — ED PROVIDER NOTE - ATTENDING CONTRIBUTION TO CARE
agree with resident note    "68 yo F, hx of CAD s/p stent placement 2019 on Plavix, HLD, HTN, T2DM, presenting w/ LUE weakness and numbness. Last known well time was yesterday night at ~2200. The patient woke up with her symptoms. No associated changes in voice, vision changes. Was seen by her cardiologist this AM, Dr. Ezio Sharma, this AM and referred here. No recent changes to medications. NKDA. Remote hx of small meningioma."    PE: well appearing; VSS; CTAB/L; s1 s2 no m/r/g abd soft/NT/ND ext: no edema Neuro: CNs intact 5/5 motor UE and LE; sensation intact; states subjectively hands feel differently on left than right;     Imp: r/o CVA: CT head, labs, consult neuro, pt is on ASA, no longer on plavix

## 2022-08-09 NOTE — CONSULT NOTE ADULT - ASSESSMENT
Sherley Padilla is a 67 year old RH woman with a past medical history of CAD s/p stenting, HTN, HLD, DM, history of carotid stenosis who is presenting for LUE numbness and tingling.    Impression: trace LUE monoparesis and sensations of numbness with positive Tinel at the elbow more suggestive of a peripheral nerve process possibly at the axilla in the setting of repeated overload by carrying items on the shoulder. Cannot completely rule out vascular event as patient has major risk factors. Lower suspicion of radiculopathy.    Recs:  [] CDU admission  [] MR brain w/o contrast  [] MR cervical spine w/o contrast  [] MR brachial plexus w/o contrast  [] continue aspirin 81mg daily  [] add Plavix 75mg daily and may discontinue if MRI negative for infarct  [] continue home atorvastatin  [] risk factor control of HTN, DM, HLD  [] gradual normotension  [] if above MRIs are negative for acute pathology may benefit from outpatient EMG/NCS  [] upon discharge may follow up with neurology at [address] [place] [address] ([phone])    Patient to be seen and discussed with neurology attending, Dr. Frias.  Sherley Padilla is a 67 year old RH woman with a past medical history of CAD s/p stenting, HTN, HLD, DM, history of carotid stenosis who is presenting for LUE numbness and tingling.    Impression: trace LUE monoparesis and sensations of numbness with positive Tinel at the elbow more suggestive of a peripheral nerve process possibly at the axilla in the setting of repeated overload by carrying items on the shoulder. Cannot completely rule out vascular event as patient has major risk factors. Lower suspicion of radiculopathy.    Recs:  [] CDU admission  [] MR brain w/o contrast  [] MR cervical spine w/o contrast  [] MR Right brachial plexus w/o contrast  [] continue aspirin 81mg daily  [] add Plavix 75mg daily and may discontinue if MRI negative for infarct  [] continue home atorvastatin  [] risk factor control of HTN, DM, HLD  [] gradual normotension  [] if above MRIs are negative for acute pathology may benefit from outpatient EMG/NCS  [] upon discharge may follow up with neurology at [address] [place] [address] ([phone])    Patient to be seen and discussed with neurology attending, Dr. Frias.  Sherley Padilla is a 67 year old RH woman with a past medical history of CAD s/p stenting, HTN, HLD, DM, history of carotid stenosis who is presenting for LUE numbness and tingling.    Impression: trace LUE monoparesis and sensations of numbness with positive Tinel at the elbow more suggestive of a peripheral nerve process possibly at the axilla in the setting of repeated overload by carrying items on the shoulder. Cannot completely rule out vascular event as patient has major risk factors. Lower suspicion of radiculopathy.    Recs:  [] CDU admission  [] MR brain w/o contrast  [] MR cervical spine w/o contrast  [] MR Left brachial plexus w/o contrast  [] continue aspirin 81mg daily  [] add Plavix 75mg daily and may discontinue if MRI negative for infarct  [] continue home atorvastatin  [] risk factor control of HTN, DM, HLD  [] gradual normotension  [] if above MRIs are negative for acute pathology may benefit from outpatient EMG/NCS  [] upon discharge may follow up with neurology at [address] [place] [address] ([phone])    Patient to be seen and discussed with neurology attending, Dr. Frias.

## 2022-08-10 VITALS
TEMPERATURE: 98 F | DIASTOLIC BLOOD PRESSURE: 76 MMHG | OXYGEN SATURATION: 97 % | HEART RATE: 68 BPM | SYSTOLIC BLOOD PRESSURE: 143 MMHG | RESPIRATION RATE: 19 BRPM

## 2022-08-10 LAB
A1C WITH ESTIMATED AVERAGE GLUCOSE RESULT: 9.2 % — HIGH (ref 4–5.6)
ESTIMATED AVERAGE GLUCOSE: 217 — SIGNIFICANT CHANGE UP

## 2022-08-10 PROCEDURE — 71550 MRI CHEST W/O DYE: CPT | Mod: 26,LT

## 2022-08-10 PROCEDURE — 70551 MRI BRAIN STEM W/O DYE: CPT | Mod: 26,MA

## 2022-08-10 PROCEDURE — 99217: CPT

## 2022-08-10 PROCEDURE — 99285 EMERGENCY DEPT VISIT HI MDM: CPT

## 2022-08-10 PROCEDURE — 72141 MRI NECK SPINE W/O DYE: CPT | Mod: 26,MA

## 2022-08-10 RX ORDER — INSULIN LISPRO 100/ML
5 VIAL (ML) SUBCUTANEOUS ONCE
Refills: 0 | Status: COMPLETED | OUTPATIENT
Start: 2022-08-10 | End: 2022-08-10

## 2022-08-10 RX ORDER — ISOPROPYL ALCOHOL, BENZOCAINE .7; .06 ML/ML; ML/ML
1 SWAB TOPICAL
Qty: 120 | Refills: 1
Start: 2022-08-10 | End: 2022-10-08

## 2022-08-10 RX ADMIN — LISINOPRIL 20 MILLIGRAM(S): 2.5 TABLET ORAL at 05:56

## 2022-08-10 RX ADMIN — Medication 25 MILLIGRAM(S): at 05:56

## 2022-08-10 RX ADMIN — Medication 5: at 11:35

## 2022-08-10 RX ADMIN — Medication 5 UNIT(S): at 13:24

## 2022-08-10 RX ADMIN — Medication 12.5 MILLIGRAM(S): at 05:56

## 2022-08-10 RX ADMIN — CLOPIDOGREL BISULFATE 75 MILLIGRAM(S): 75 TABLET, FILM COATED ORAL at 11:36

## 2022-08-10 RX ADMIN — Medication 4: at 07:37

## 2022-08-10 NOTE — ED ADULT NURSE REASSESSMENT NOTE - NS ED NURSE REASSESS COMMENT FT1
Break coverage RN: pt returned from MRI. ambulates to bathroom with steady gait. in no apparent distress. placed back on cardiac monitor, NSR on CM

## 2022-08-10 NOTE — CONSULT NOTE ADULT - SUBJECTIVE AND OBJECTIVE BOX
Sherley Padilla is a 67 year old RH woman with a past medical history of CAD s/p stenting, HTN, HLD, DM, history of carotid stenosis who is presenting for LUE numbness and tingling. At baseline, the patient is ambulatory without assistive devices and is oriented to person, place, time. She went to bed at her usual baseline level of health at [id number] and awoke at 0300 with sensation of LUE numbness and tingling. Pt denies boewl/ bladder incontinence.  She noted that the tingling had been a sensation as if her arm fell asleep but had been persistent. She stated that predominantly the sensation was below the elbow to the fingertips but occasionally travelled up to her shoulder. She noted the feeling was circumferential around the arm. She stated that she had not had sensations like this in the past. Also did note that she felt that her L arm felt heavy as compared with the R. Denied dropping items or mishandling items. Did state that for her job she carries a laptop which she hangs over her L shoulder and had been having occasional shoulder and deltoid pain. Denied any neck pain or stiffness. Did note that she had some headaches. Denied visual changes, auditory changes, dysphagia, dysarthria, R sided symptoms, LE symptoms. She had gone to her cardiologist in the afternoon who had recommended that she present to the ED for further evaluation. Currently stated that her symptoms of the tingling has dissapated.  PtHx lumbar microdiscectomy by Dr Mathur , Ortho spine attending in 2004.         HEALTH ISSUES - PROBLEM Dx:          MEDICATIONS  (STANDING):  atorvastatin 80 milliGRAM(s) Oral at bedtime  clopidogrel Tablet 75 milliGRAM(s) Oral daily  dextrose 5%. 1000 milliLiter(s) IV Continuous <Continuous>  dextrose 5%. 1000 milliLiter(s) IV Continuous <Continuous>  dextrose 50% Injectable 25 Gram(s) IV Push once  dextrose 50% Injectable 12.5 Gram(s) IV Push once  dextrose 50% Injectable 25 Gram(s) IV Push once  glucagon  Injectable 1 milliGRAM(s) IntraMuscular once  hydrochlorothiazide 12.5 milliGRAM(s) Oral daily  insulin lispro (ADMELOG) corrective regimen sliding scale   SubCutaneous three times a day before meals  insulin lispro (ADMELOG) corrective regimen sliding scale   SubCutaneous at bedtime  insulin lispro Injectable (ADMELOG) 5 Unit(s) SubCutaneous once  lisinopril 20 milliGRAM(s) Oral daily  metoprolol tartrate 25 milliGRAM(s) Oral two times a day      Allergies    bacitracin (Pruritus; Rash)  Neosporin (Flushing; Pruritus)    Intolerances        PAST MEDICAL & SURGICAL HISTORY:  Anxiety  hx anxiety during yrs of menstuation    Depression  hx depression during yrs of menstruation/ stable now    Osteoarthrosis  hip replacement left 2014    Hyperlipidemia  on meds    Murmur  benign    Rosacea    Anxiety and depression  hx depression during yrs of menstruation/ stable now    Anxiety    Herniated lumbar intervertebral disc  L5 shaved 2004    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    History of hip replacement, total    History of total hip replacement, left  9/9/2014    History of bilateral cataract extraction    Hand disorder  arthritis    History of hand surgery  basal anthroplasty- left    S/P laminectomy  microdiscectomy L5 2004    History of total hip replacement, left  10/2019    History of total hip replacement, right  10/2019                              13.2   6.87  )-----------( 293      ( 09 Aug 2022 17:40 )             37.6       09 Aug 2022 17:40    135    |  100    |  26     ----------------------------<  214    3.8     |  22     |  0.73     Ca    9.8        09 Aug 2022 17:40    TPro  7.5    /  Alb  4.7    /  TBili  0.4    /  DBili  x      /  AST  45     /  ALT  85     /  AlkPhos  160    09 Aug 2022 17:40      PT/INR - ( 09 Aug 2022 17:40 )   PT: 10.7 sec;   INR: 0.92 ratio         PTT - ( 09 Aug 2022 17:40 )  PTT:33.1 sec        Vital Signs Last 24 Hrs  T(C): 36.4 (08-10-22 @ 10:21), Max: 36.8 (08-09-22 @ 23:10)  T(F): 97.5 (08-10-22 @ 10:21), Max: 98.2 (08-09-22 @ 23:10)  HR: 68 (08-10-22 @ 10:21) (59 - 84)  BP: 143/76 (08-10-22 @ 10:21) (108/63 - 211/105)  BP(mean): --  RR: 19 (08-10-22 @ 10:21) (14 - 19)  SpO2: 97% (08-10-22 @ 10:21) (97% - 100%)    Gen: NAD    Spine PE:  Skin intact  No gross deformity  No midnline TTP C/T/L/S spine  No bony step offs  No paraspinal muscle ttp/hypertonicity   Negative Straight leg raise  Negative clonus  Negative babinski  Negative pozo  + rectal tone  No saddle anesthesia    Motor:                   C5                C6              C7               C8           T1   R            5/5                5/5            5/5             5/5          5/5  L             5/5               4/5             5/5             5/5          5/5                L2             L3             L4               L5            S1  R         5/5           5/5          5/5             5/5           5/5  L          5/5          5/5           5/5             5/5           5/5    Sensory:            C5         C6         C7      C8       T1        (0=absent, 1=impaired, 2=normal, NT=not testable)  R         2            2           2        2         2  L          2            2           2        2         2               L2          L3         L4      L5       S1         (0=absent, 1=impaired, 2=normal, NT=not testable)  R         2            2            2        2        2  L          2            2           2        2         2    Imaging:     < from: MR Brachial Plexus No Cont, Left (08.10.22 @ 04:40) >    ACC: [id number] EXAM:  MR SPINE CERVICAL                        ACC: [id number] EXAM:  MR BRACHIAL PLEXUS CHEST LT                          PROCEDURE DATE:  08/10/2022          INTERPRETATION:  MRI Cervical Spine and Brachial Plexus    Clinical information  New onset left upper extremity weakness.    Technique  Multisequence, multiplanar MRI was performed of the cervical spine and   left brachial plexus.    IV Contrast  This study was performed without the use of intravenous or   intra-articular contrast.    Comparison  Thyroid ultrasound 10/8/2020.      Findings    Cervical spine  ALIGNMENT: Straightening of the normal cervical lordosis.  MARROW: Vertebral body heights are preserved. Degenerative endplate   changes at the C5-C7 levels.  CORD: Cord is normal in caliber and signal characteristics.  DISC SPACES: Loss of intervertebral disc height at C5-C7 levels.  PERIPHERAL/NECK SOFT TISSUES: Atrophy and fatty infiltration of the   paraspinous muscles. T2 hyperintense nodule in the right lobe of the   thyroid, similar in appearance to prior ultrasound.    DISC LEVEL EVALUATION:    C2/C3: Bilateral facet arthropathy. No central or neural foraminal   stenosis.  C3/C4: Bilateral facet arthropathy. No central or neural foraminal   stenosis.  C4/C5: Mild bilateral facet arthropathy. No central or neural foraminal   stenosis.  C5/C6: Disc bulge with posterior osseous ridging, uncovertebral spurring,   and facet arthropathy resulting in effacement of the ventral thecal sac   and contacting the anterior surface of the cord without cord signal   abnormality. Mild central stenosis. There is minimal bilateral neural   foraminal stenosis.  C6/C7: Disc bulge partially effaces the ventral thecal sac and contacts   the ventral surface of the cord without cord signal abnormality. Mild   central stenosis. Mild left neural foraminal stenosis.  C7/T1: No central or neural foraminal stenosis.      Brachial plexus  Roots: Normal  Trunks: Normal  Divisions: Normal  Cords: Normal  Branches: Normal    Muscles and tendons  Atrophy and fatty infiltration of the scalenes. Additional areas of fatty   infiltration are seen in the deltoid muscle. No focal muscle edema. The   fatty infiltration is identified in the bilateral chest wall and neck   musculature and appears fairly symmetric.    Bones  Cervical ribs: No cervical ribs identified  C7 transverse processes: Normal  Marrow signal: Normal    Joints  Normal left-sided sternoclavicular and acromioclavicular joints.    Thoracic outlet and cervicothoracic junction  Normal interscalene triangle, costoclavicular space, and retropectoralis   minor space.    Other findings  No vascular abnormality, mass, or other significant finding.    Impression:  1.  Multilevel spondylosis of the cervical spine.  2.  No focal abnormality of the left brachial plexus.  3.  Diffuse atrophy and fatty infiltration of the imaged musculature   without focal edema in a symmetric pattern.    --- End of Report ---            SHADE CASTANEDA MD; Attending Radiologist  This document has been electronically signed. Aug 10 2022  8:43AM    < end of copied text >  < from: MR Head No Cont (08.10.22 @ 04:40) >    ACC: [id number] EXAM:  MR BRAIN                          PROCEDURE DATE:  08/10/2022          INTERPRETATION:  .    CLINICAL INFORMATION: Left upper extremity weakness.    TECHNIQUE: Multiplanar multisequential MRI of the brain was acquired   withoutthe administration of IV gadolinium.    COMPARISON: Prior CT examination of the head and CT angiogram studies of   the head and neck from 8/9/2022..    FINDINGS: There is a 1 cm spherically shaped calcified structure arising   off the high right anterior falx which may be compatible with a   meningioma versus dural calcification. There is no adjacent mass effect   or edema.    A few small rounded nonspecific foci of T2/FLAIR hyperintensity are noted   throughout the deep and periventricular whitematter of the cerebral   hemispheres. There is no associated mass effect. There is no evidence of   acute ischemia on the diffusion-weighted images.    There is diffuse cerebral volume loss with prominence of the sulci,   fissures, and cisternal spaces which is normal for the patient's age.   Mild ventriculomegaly is unchanged. Flow-voids are noted throughout the   major intracranial vessels, on the T2 weighted images, consistent with   their patency. There is fenestration of the left A1 segment.The sellar   region and posterior fossa appear unremarkable.    Scattered mucosal thickening is seen throughout the paranasal sinuses.   Mastoid air cells are clear. Calvarial signal is within normal limits.   There is evidence of bilateral cataract removal.    IMPRESSION: No acute intracranial hemorrhage or evidence of acute   ischemia.    1 cm calcified meningioma versus dural calcification off the high right   anterior parafalcine location. No surrounding mass effect or edema.    --- End of Report ---            ZENAIDA FRITZ MD; Attending Radiologist  This document has been electronically signed. Aug 10 2022  8:22AM    < end of copied text >      A/P: 67y Female with L upper extremity weakness, paresthesias now resolved , spondylosis on cervical MRI   Pain control  WBAT with assistive devices as needed  No acute surgical intervention necessary, pt may FU as outpt with DR Mathur [phone]          Sherley Padilla is a 67 year old RH woman with a past medical history of CAD s/p stenting, HTN, HLD, DM, history of carotid stenosis who is presenting for LUE numbness and tingling. At baseline, the patient is ambulatory without assistive devices and is oriented to person, place, time. She went to bed at her usual baseline level of health at [id number] and awoke at 0300 with sensation of LUE numbness and tingling. Pt denies boewl/ bladder incontinence.  She noted that the tingling had been a sensation as if her arm fell asleep but had been persistent. She stated that predominantly the sensation was below the elbow to the fingertips but occasionally travelled up to her shoulder. She noted the feeling was circumferential around the arm. She stated that she had not had sensations like this in the past. Also did note that she felt that her L arm felt heavy as compared with the R. Denied dropping items or mishandling items. Did state that for her job she carries a laptop which she hangs over her L shoulder and had been having occasional shoulder and deltoid pain. Denied any neck pain or stiffness. Did note that she had some headaches. Denied visual changes, auditory changes, dysphagia, dysarthria, R sided symptoms, LE symptoms. She had gone to her cardiologist in the afternoon who had recommended that she present to the ED for further evaluation. Currently stated that her symptoms of the tingling has dissapated.  PtHx lumbar microdiscectomy by Dr Mathur , Ortho spine attending in 2004.         HEALTH ISSUES - PROBLEM Dx:          MEDICATIONS  (STANDING):  atorvastatin 80 milliGRAM(s) Oral at bedtime  clopidogrel Tablet 75 milliGRAM(s) Oral daily  dextrose 5%. 1000 milliLiter(s) IV Continuous <Continuous>  dextrose 5%. 1000 milliLiter(s) IV Continuous <Continuous>  dextrose 50% Injectable 25 Gram(s) IV Push once  dextrose 50% Injectable 12.5 Gram(s) IV Push once  dextrose 50% Injectable 25 Gram(s) IV Push once  glucagon  Injectable 1 milliGRAM(s) IntraMuscular once  hydrochlorothiazide 12.5 milliGRAM(s) Oral daily  insulin lispro (ADMELOG) corrective regimen sliding scale   SubCutaneous three times a day before meals  insulin lispro (ADMELOG) corrective regimen sliding scale   SubCutaneous at bedtime  insulin lispro Injectable (ADMELOG) 5 Unit(s) SubCutaneous once  lisinopril 20 milliGRAM(s) Oral daily  metoprolol tartrate 25 milliGRAM(s) Oral two times a day      Allergies    bacitracin (Pruritus; Rash)  Neosporin (Flushing; Pruritus)    Intolerances        PAST MEDICAL & SURGICAL HISTORY:  Anxiety  hx anxiety during yrs of menstuation    Depression  hx depression during yrs of menstruation/ stable now    Osteoarthrosis  hip replacement left 2014    Hyperlipidemia  on meds    Murmur  benign    Rosacea    Anxiety and depression  hx depression during yrs of menstruation/ stable now    Anxiety    Herniated lumbar intervertebral disc  L5 shaved 2004    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    History of hip replacement, total    History of total hip replacement, left  9/9/2014    History of bilateral cataract extraction    Hand disorder  arthritis    History of hand surgery  basal anthroplasty- left    S/P laminectomy  microdiscectomy L5 2004    History of total hip replacement, left  10/2019    History of total hip replacement, right  10/2019                          13.2   6.87  )-----------( 293      ( 09 Aug 2022 17:40 )             37.6       09 Aug 2022 17:40    135    |  100    |  26     ----------------------------<  214    3.8     |  22     |  0.73     Ca    9.8        09 Aug 2022 17:40    TPro  7.5    /  Alb  4.7    /  TBili  0.4    /  DBili  x      /  AST  45     /  ALT  85     /  AlkPhos  160    09 Aug 2022 17:40      PT/INR - ( 09 Aug 2022 17:40 )   PT: 10.7 sec;   INR: 0.92 ratio    PTT - ( 09 Aug 2022 17:40 )  PTT:33.1 sec      Vital Signs Last 24 Hrs  T(C): 36.4 (08-10-22 @ 10:21), Max: 36.8 (08-09-22 @ 23:10)  T(F): 97.5 (08-10-22 @ 10:21), Max: 98.2 (08-09-22 @ 23:10)  HR: 68 (08-10-22 @ 10:21) (59 - 84)  BP: 143/76 (08-10-22 @ 10:21) (108/63 - 211/105)  RR: 19 (08-10-22 @ 10:21) (14 - 19)  SpO2: 97% (08-10-22 @ 10:21) (97% - 100%)    Gen: NAD    Spine PE:  Skin intact  No gross deformity  No midline TTP C/T/L/S spine, +cervical paraspinal muscle TTP   Negative clonus  Negative babinski  Negative pozo  No saddle anesthesia    Motor:                   C5                C6              C7               C8           T1   R            5/5                4/5            5/5             5/5          5/5  L             5/5               4/5             5/5             5/5          5/5                L2             L3             L4               L5            S1  R         5/5           5/5          5/5             5/5           5/5  L          5/5          5/5           5/5             5/5           5/5    Sensory:            C5         C6         C7      C8       T1        (0=absent, 1=impaired, 2=normal, NT=not testable)  R         2            2           2        2         2  L          2            2           2        2         2               L2          L3         L4      L5       S1         (0=absent, 1=impaired, 2=normal, NT=not testable)  R         2            2            2        2        2  L          2            2           2        2         2    Imaging:     < from: MR Brachial Plexus No Cont, Left (08.10.22 @ 04:40) >    ACC: [id number] EXAM:  MR SPINE CERVICAL                        ACC: [id number] EXAM:  MR BRACHIAL PLEXUS CHEST LT                          PROCEDURE DATE:  08/10/2022          INTERPRETATION:  MRI Cervical Spine and Brachial Plexus    Clinical information  New onset left upper extremity weakness.    Technique  Multisequence, multiplanar MRI was performed of the cervical spine and   left brachial plexus.    IV Contrast  This study was performed without the use of intravenous or   intra-articular contrast.    Comparison  Thyroid ultrasound 10/8/2020.      Findings    Cervical spine  ALIGNMENT: Straightening of the normal cervical lordosis.  MARROW: Vertebral body heights are preserved. Degenerative endplate   changes at the C5-C7 levels.  CORD: Cord is normal in caliber and signal characteristics.  DISC SPACES: Loss of intervertebral disc height at C5-C7 levels.  PERIPHERAL/NECK SOFT TISSUES: Atrophy and fatty infiltration of the   paraspinous muscles. T2 hyperintense nodule in the right lobe of the   thyroid, similar in appearance to prior ultrasound.    DISC LEVEL EVALUATION:    C2/C3: Bilateral facet arthropathy. No central or neural foraminal   stenosis.  C3/C4: Bilateral facet arthropathy. No central or neural foraminal   stenosis.  C4/C5: Mild bilateral facet arthropathy. No central or neural foraminal   stenosis.  C5/C6: Disc bulge with posterior osseous ridging, uncovertebral spurring,   and facet arthropathy resulting in effacement of the ventral thecal sac   and contacting the anterior surface of the cord without cord signal   abnormality. Mild central stenosis. There is minimal bilateral neural   foraminal stenosis.  C6/C7: Disc bulge partially effaces the ventral thecal sac and contacts   the ventral surface of the cord without cord signal abnormality. Mild   central stenosis. Mild left neural foraminal stenosis.  C7/T1: No central or neural foraminal stenosis.      Brachial plexus  Roots: Normal  Trunks: Normal  Divisions: Normal  Cords: Normal  Branches: Normal    Muscles and tendons  Atrophy and fatty infiltration of the scalenes. Additional areas of fatty   infiltration are seen in the deltoid muscle. No focal muscle edema. The   fatty infiltration is identified in the bilateral chest wall and neck   musculature and appears fairly symmetric.    Bones  Cervical ribs: No cervical ribs identified  C7 transverse processes: Normal  Marrow signal: Normal    Joints  Normal left-sided sternoclavicular and acromioclavicular joints.    Thoracic outlet and cervicothoracic junction  Normal interscalene triangle, costoclavicular space, and retropectoralis   minor space.    Other findings  No vascular abnormality, mass, or other significant finding.    Impression:  1.  Multilevel spondylosis of the cervical spine.  2.  No focal abnormality of the left brachial plexus.  3.  Diffuse atrophy and fatty infiltration of the imaged musculature   without focal edema in a symmetric pattern.    --- End of Report ---            SHADE CASTANEDA MD; Attending Radiologist  This document has been electronically signed. Aug 10 2022  8:43AM    < end of copied text >  < from: MR Head No Cont (08.10.22 @ 04:40) >    ACC: [id number] EXAM:  MR BRAIN                          PROCEDURE DATE:  08/10/2022          INTERPRETATION:  .    CLINICAL INFORMATION: Left upper extremity weakness.    TECHNIQUE: Multiplanar multisequential MRI of the brain was acquired   withoutthe administration of IV gadolinium.    COMPARISON: Prior CT examination of the head and CT angiogram studies of   the head and neck from 8/9/2022..    FINDINGS: There is a 1 cm spherically shaped calcified structure arising   off the high right anterior falx which may be compatible with a   meningioma versus dural calcification. There is no adjacent mass effect   or edema.    A few small rounded nonspecific foci of T2/FLAIR hyperintensity are noted   throughout the deep and periventricular whitematter of the cerebral   hemispheres. There is no associated mass effect. There is no evidence of   acute ischemia on the diffusion-weighted images.    There is diffuse cerebral volume loss with prominence of the sulci,   fissures, and cisternal spaces which is normal for the patient's age.   Mild ventriculomegaly is unchanged. Flow-voids are noted throughout the   major intracranial vessels, on the T2 weighted images, consistent with   their patency. There is fenestration of the left A1 segment.The sellar   region and posterior fossa appear unremarkable.    Scattered mucosal thickening is seen throughout the paranasal sinuses.   Mastoid air cells are clear. Calvarial signal is within normal limits.   There is evidence of bilateral cataract removal.    IMPRESSION: No acute intracranial hemorrhage or evidence of acute   ischemia.    1 cm calcified meningioma versus dural calcification off the high right   anterior parafalcine location. No surrounding mass effect or edema.    --- End of Report ---            ZENAIDA FRITZ MD; Attending Radiologist  This document has been electronically signed. Aug 10 2022  8:22AM    < end of copied text >      A/P: 67y Female with L upper extremity weakness, paresthesias now resolved , spondylosis on cervical MRI   Pain control  WBAT with assistive devices as needed  No acute surgical intervention necessary, pt may FU as outpt with DR Mathur [phone]          Sherley Padilla is a 67 year old RH woman with a past medical history of CAD s/p stenting, HTN, HLD, DM, history of carotid stenosis who is presenting for LUE numbness and tingling. At baseline, the patient is ambulatory without assistive devices and is oriented to person, place, time. She went to bed at her usual baseline level of health at [id number] and awoke at 0300 with sensation of LUE numbness and tingling. Pt denies boewl/ bladder incontinence.  She noted that the tingling had been a sensation as if her arm fell asleep but had been persistent. She stated that predominantly the sensation was below the elbow to the fingertips but occasionally travelled up to her shoulder. She noted the feeling was circumferential around the arm. She stated that she had not had sensations like this in the past. Also did note that she felt that her L arm felt heavy as compared with the R. Denied dropping items or mishandling items. Did state that for her job she carries a laptop which she hangs over her L shoulder and had been having occasional shoulder and deltoid pain. Denied any neck pain or stiffness. Did note that she had some headaches. Denied visual changes, auditory changes, dysphagia, dysarthria, R sided symptoms, LE symptoms. She had gone to her cardiologist in the afternoon who had recommended that she present to the ED for further evaluation. Currently stated that her symptoms of the tingling has dissapated.  PtHx lumbar microdiscectomy by Dr Mathur , Ortho spine attending in 2004.         HEALTH ISSUES - PROBLEM Dx:          MEDICATIONS  (STANDING):  atorvastatin 80 milliGRAM(s) Oral at bedtime  clopidogrel Tablet 75 milliGRAM(s) Oral daily  dextrose 5%. 1000 milliLiter(s) IV Continuous <Continuous>  dextrose 5%. 1000 milliLiter(s) IV Continuous <Continuous>  dextrose 50% Injectable 25 Gram(s) IV Push once  dextrose 50% Injectable 12.5 Gram(s) IV Push once  dextrose 50% Injectable 25 Gram(s) IV Push once  glucagon  Injectable 1 milliGRAM(s) IntraMuscular once  hydrochlorothiazide 12.5 milliGRAM(s) Oral daily  insulin lispro (ADMELOG) corrective regimen sliding scale   SubCutaneous three times a day before meals  insulin lispro (ADMELOG) corrective regimen sliding scale   SubCutaneous at bedtime  insulin lispro Injectable (ADMELOG) 5 Unit(s) SubCutaneous once  lisinopril 20 milliGRAM(s) Oral daily  metoprolol tartrate 25 milliGRAM(s) Oral two times a day      Allergies    bacitracin (Pruritus; Rash)  Neosporin (Flushing; Pruritus)    Intolerances        PAST MEDICAL & SURGICAL HISTORY:  Anxiety  hx anxiety during yrs of menstuation    Depression  hx depression during yrs of menstruation/ stable now    Osteoarthrosis  hip replacement left 2014    Hyperlipidemia  on meds    Murmur  benign    Rosacea    Anxiety and depression  hx depression during yrs of menstruation/ stable now    Anxiety    Herniated lumbar intervertebral disc  L5 shaved 2004    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    History of hip replacement, total    History of total hip replacement, left  9/9/2014    History of bilateral cataract extraction    Hand disorder  arthritis    History of hand surgery  basal anthroplasty- left    S/P laminectomy  microdiscectomy L5 2004    History of total hip replacement, left  10/2019    History of total hip replacement, right  10/2019                          13.2   6.87  )-----------( 293      ( 09 Aug 2022 17:40 )             37.6       09 Aug 2022 17:40    135    |  100    |  26     ----------------------------<  214    3.8     |  22     |  0.73     Ca    9.8        09 Aug 2022 17:40    TPro  7.5    /  Alb  4.7    /  TBili  0.4    /  DBili  x      /  AST  45     /  ALT  85     /  AlkPhos  160    09 Aug 2022 17:40      PT/INR - ( 09 Aug 2022 17:40 )   PT: 10.7 sec;   INR: 0.92 ratio    PTT - ( 09 Aug 2022 17:40 )  PTT:33.1 sec      Vital Signs Last 24 Hrs  T(C): 36.4 (08-10-22 @ 10:21), Max: 36.8 (08-09-22 @ 23:10)  T(F): 97.5 (08-10-22 @ 10:21), Max: 98.2 (08-09-22 @ 23:10)  HR: 68 (08-10-22 @ 10:21) (59 - 84)  BP: 143/76 (08-10-22 @ 10:21) (108/63 - 211/105)  RR: 19 (08-10-22 @ 10:21) (14 - 19)  SpO2: 97% (08-10-22 @ 10:21) (97% - 100%)    Gen: NAD    Spine PE:  Skin intact  No gross deformity  No midline TTP C/T/L/S spine, +cervical paraspinal muscle TTP   Negative clonus  Negative babinski  Negative pozo  No saddle anesthesia    Motor:                   C5                C6              C7               C8           T1   R            5/5                4/5            5/5             5/5          5/5  L             5/5               4/5             5/5             5/5          5/5                L2             L3             L4               L5            S1  R         5/5           5/5          5/5             5/5           5/5  L          5/5          5/5           5/5             5/5           5/5    Sensory:            C5         C6         C7      C8       T1        (0=absent, 1=impaired, 2=normal, NT=not testable)  R         2            2           2        2         2  L          2            2           2        2         2               L2          L3         L4      L5       S1         (0=absent, 1=impaired, 2=normal, NT=not testable)  R         2            2            2        2        2  L          2            2           2        2         2    Imaging:     < from: MR Brachial Plexus No Cont, Left (08.10.22 @ 04:40) >    ACC: [id number] EXAM:  MR SPINE CERVICAL                        ACC: [id number] EXAM:  MR BRACHIAL PLEXUS CHEST LT                          PROCEDURE DATE:  08/10/2022          INTERPRETATION:  MRI Cervical Spine and Brachial Plexus    Clinical information  New onset left upper extremity weakness.    Technique  Multisequence, multiplanar MRI was performed of the cervical spine and   left brachial plexus.    IV Contrast  This study was performed without the use of intravenous or   intra-articular contrast.    Comparison  Thyroid ultrasound 10/8/2020.      Findings    Cervical spine  ALIGNMENT: Straightening of the normal cervical lordosis.  MARROW: Vertebral body heights are preserved. Degenerative endplate   changes at the C5-C7 levels.  CORD: Cord is normal in caliber and signal characteristics.  DISC SPACES: Loss of intervertebral disc height at C5-C7 levels.  PERIPHERAL/NECK SOFT TISSUES: Atrophy and fatty infiltration of the   paraspinous muscles. T2 hyperintense nodule in the right lobe of the   thyroid, similar in appearance to prior ultrasound.    DISC LEVEL EVALUATION:    C2/C3: Bilateral facet arthropathy. No central or neural foraminal   stenosis.  C3/C4: Bilateral facet arthropathy. No central or neural foraminal   stenosis.  C4/C5: Mild bilateral facet arthropathy. No central or neural foraminal   stenosis.  C5/C6: Disc bulge with posterior osseous ridging, uncovertebral spurring,   and facet arthropathy resulting in effacement of the ventral thecal sac   and contacting the anterior surface of the cord without cord signal   abnormality. Mild central stenosis. There is minimal bilateral neural   foraminal stenosis.  C6/C7: Disc bulge partially effaces the ventral thecal sac and contacts   the ventral surface of the cord without cord signal abnormality. Mild   central stenosis. Mild left neural foraminal stenosis.  C7/T1: No central or neural foraminal stenosis.      Brachial plexus  Roots: Normal  Trunks: Normal  Divisions: Normal  Cords: Normal  Branches: Normal    Muscles and tendons  Atrophy and fatty infiltration of the scalenes. Additional areas of fatty   infiltration are seen in the deltoid muscle. No focal muscle edema. The   fatty infiltration is identified in the bilateral chest wall and neck   musculature and appears fairly symmetric.    Bones  Cervical ribs: No cervical ribs identified  C7 transverse processes: Normal  Marrow signal: Normal    Joints  Normal left-sided sternoclavicular and acromioclavicular joints.    Thoracic outlet and cervicothoracic junction  Normal interscalene triangle, costoclavicular space, and retropectoralis   minor space.    Other findings  No vascular abnormality, mass, or other significant finding.    Impression:  1.  Multilevel spondylosis of the cervical spine.  2.  No focal abnormality of the left brachial plexus.  3.  Diffuse atrophy and fatty infiltration of the imaged musculature   without focal edema in a symmetric pattern.    --- End of Report ---            SHADE CASTANEDA MD; Attending Radiologist  This document has been electronically signed. Aug 10 2022  8:43AM    < end of copied text >  < from: MR Head No Cont (08.10.22 @ 04:40) >    ACC: [id number] EXAM:  MR BRAIN                          PROCEDURE DATE:  08/10/2022          INTERPRETATION:  .    CLINICAL INFORMATION: Left upper extremity weakness.    TECHNIQUE: Multiplanar multisequential MRI of the brain was acquired   withoutthe administration of IV gadolinium.    COMPARISON: Prior CT examination of the head and CT angiogram studies of   the head and neck from 8/9/2022..    FINDINGS: There is a 1 cm spherically shaped calcified structure arising   off the high right anterior falx which may be compatible with a   meningioma versus dural calcification. There is no adjacent mass effect   or edema.    A few small rounded nonspecific foci of T2/FLAIR hyperintensity are noted   throughout the deep and periventricular whitematter of the cerebral   hemispheres. There is no associated mass effect. There is no evidence of   acute ischemia on the diffusion-weighted images.    There is diffuse cerebral volume loss with prominence of the sulci,   fissures, and cisternal spaces which is normal for the patient's age.   Mild ventriculomegaly is unchanged. Flow-voids are noted throughout the   major intracranial vessels, on the T2 weighted images, consistent with   their patency. There is fenestration of the left A1 segment.The sellar   region and posterior fossa appear unremarkable.    Scattered mucosal thickening is seen throughout the paranasal sinuses.   Mastoid air cells are clear. Calvarial signal is within normal limits.   There is evidence of bilateral cataract removal.    IMPRESSION: No acute intracranial hemorrhage or evidence of acute   ischemia.    1 cm calcified meningioma versus dural calcification off the high right   anterior parafalcine location. No surrounding mass effect or edema.    --- End of Report ---            ZENAIDA FRITZ MD; Attending Radiologist  This document has been electronically signed. Aug 10 2022  8:22AM    < end of copied text >      A/P: 67y Female with L upper extremity weakness, paresthesias now resolved , spondylosis on cervical MRI   Pain control  WBAT with assistive devices as needed  No acute surgical intervention necessary, pt may FU as outpt with DR Mathur [phone]    I discussed with the above, Dr. Emerson Mathur.

## 2022-08-10 NOTE — ED CDU PROVIDER SUBSEQUENT DAY NOTE - MEDICAL DECISION MAKING DETAILS
68 yo F, hx of CAD s/p stent placement 2019 on Plavix, HLD, HTN, T2DM, presenting w/ LUE weakness and numbness.  - MR brain, c-spine, brachial plexus  - neuro following  - continue home meds  - cardiac monitoring

## 2022-08-10 NOTE — ED CDU PROVIDER DISPOSITION NOTE - CARE PROVIDER_API CALL
Emerson Mathur (MD; DC)  Orthopaedic Surgery  [address], [address]  [place] [address]  Phone: [phone]  Fax: [phone]  Follow Up Time:

## 2022-08-10 NOTE — ED CDU PROVIDER DISPOSITION NOTE - PATIENT PORTAL LINK FT
You can access the FollowMyHealth Patient Portal offered by [facility] by registering at the following website: [email or web address]. By joining [facility]’s FollowMyHealth portal, you will also be able to view your health information using other applications (apps) compatible with our system.

## 2022-08-10 NOTE — ED CDU PROVIDER SUBSEQUENT DAY NOTE - ATTENDING APP SHARED VISIT CONTRIBUTION OF CARE
67 y/p  F, hx of CAD s/p stent placement 2019 on Plavix, HLD, HTN, T2DM, presenting w/ LUE weakness and numbness. Pt reports symptoms have been progressing over the last several days. She denies trauma to the neck or syncope. Pt was seen in ED had ct scan of her which was normal. Pt was placed in the CDU for neuro evaluation. Pt had MRI showing 1.  Multilevel spondylosis of the cervical spine.2.  No focal abnormality of the left brachial plexus.3.  Diffuse atrophy and fatty infiltration of the imaged musculature without focal edema in a symmetric pattern.  Pt reports mild weakness in LE, which  she thinks is improved  denies fever, chills, chest pain, SOB, abdominal pain, diarrhea, dysuria, syncope, bleeding, new rash,+ LUE weakness, numbness, blurred vision    ROS  otherwise negative as per HPI  Gen: Awake, Alert, WD, WN, NAD  Head:  NC/AT  Eyes:  PERRL, EOMI, Conjunctiva pink, lids normal, no scleral icterus  ENT: OP clear, no exudates, no erythema, uvula midline, TMs clear bilaterally, moist mucus membranes  Neck: supple, nontender, no meningismus, no JVD, trachea midline  Cardiac/CV:  S1 S2, RRR, no M/G/R  Respiratory/Pulm:  CTAB,   Gastrointestinal/Abdomen:  Soft, nontender, nondistended, +BS, no rebound/guarding  Ext:  warm, well perfused, moving all extremities spontaneously, no peripheral edema, distal pulses intact  Skin: intact, no rash  Neuro:  AAOx3, sensation intact, motor 5/5 x 4 extremities mildly decreased strenght LUE ,  speech clear  CDU plan of care   orthospine evaluation  d/c with neurosurgery f/u

## 2022-08-10 NOTE — ED CDU PROVIDER DISPOSITION NOTE - NSFOLLOWUPINSTRUCTIONS_ED_ALL_ED_FT
Rest, drink plenty of fluids.  Advance activity as tolerated.  Continue all previously prescribed medications as directed.  Follow up with your primary care physician in 48-72 hours- bring copies of your results.  Return to the ER for worsening or persistent symptoms, and/or ANY NEW OR CONCERNING SYMPTOMS. If you have issues obtaining follow up, please call: [phone] to obtain a doctor or specialist who takes your insurance in your area.  You may call [phone] to make an appointment with the internal medicine clinic.

## 2022-08-10 NOTE — ED CDU PROVIDER DISPOSITION NOTE - CLINICAL COURSE
this is a 67 y.o female with a PMhx of CAD, S/p stents on plavix, HLD, HTN, T2DM, presented to the ED complaining of having weakness and numbness in the upper extremity. Patient Patient was sent in by her cardiologist. Patient had multiple imagings including CTA, MR of the head and neck and was evaluated by neuro and orthopedics and recommended follow up outpatient. Pt currently states that she feels better.

## 2022-08-10 NOTE — ED CDU PROVIDER SUBSEQUENT DAY NOTE - HISTORY
66 yo F, hx of CAD s/p stent placement 2019 on Plavix, HLD, HTN, T2DM, presenting w/ LUE weakness and numbness. Last known well time was yesterday night at ~2200.   CT, CTA imaging negative. Labs unremarkable. Pt seen by Neurology, recommending MR brain, c-spine, brachial plexus. pt well appearing with no new complaints. Plan to observe in CDU for MRI imaging.

## 2022-08-10 NOTE — ED CDU PROVIDER DISPOSITION NOTE - ATTENDING CONTRIBUTION TO CARE
67 y/p  F, hx of CAD s/p stent placement 2019 on Plavix, HLD, HTN, T2DM, presenting w/ LUE weakness and numbness. Pt reports symptoms have been progressing over the last several days. She denies trauma to the neck or syncope. Pt was seen in ED had ct scan of her which was normal. Pt was placed in the CDU for neuro evaluation. Pt had MRI showing 1.  Multilevel spondylosis of the cervical spine.2.  No focal abnormality of the left brachial plexus.3.  Diffuse atrophy and fatty infiltration of the imaged musculature without focal edema in a symmetric pattern.  Pt reports mild weakness in LE, which  she thinks is improved. pt seen by ortho spine provided with follow up with dr. hyatt  denies fever, chills, chest pain, SOB, abdominal pain, diarrhea, dysuria, syncope, bleeding, new rash,+ LUE weakness, numbness, blurred vision    ROS  otherwise negative as per HPI  Gen: Awake, Alert, WD, WN, NAD  Head:  NC/AT  Eyes:  PERRL, EOMI, Conjunctiva pink, lids normal, no scleral icterus  ENT: OP clear, no exudates, no erythema, uvula midline, TMs clear bilaterally, moist mucus membranes  Neck: supple, nontender, no meningismus, no JVD, trachea midline  Cardiac/CV:  S1 S2, RRR, no M/G/R  Respiratory/Pulm:  CTAB,   Gastrointestinal/Abdomen:  Soft, nontender, nondistended, +BS, no rebound/guarding  Ext:  warm, well perfused, moving all extremities spontaneously, no peripheral edema, distal pulses intact  Skin: intact, no rash  Neuro:  AAOx3, sensation intact, motor 5/5 x 4 extremities mildly decreased strenght LUE ,  speech clear  CDU plan of care   orthospine f/u

## 2022-08-10 NOTE — ED ADULT NURSE REASSESSMENT NOTE - NS ED NURSE REASSESS COMMENT FT1
pt resting in stretcher; no distress. aware of plan to be evaluated by ortho followed likely by d/c. pt in no distress. ready to be d/c. lunch eaten. will continue to monitor.

## 2022-08-12 RX ORDER — BLOOD-GLUCOSE METER
W/DEVICE KIT MISCELLANEOUS
Qty: 1 | Refills: 0 | Status: ACTIVE | COMMUNITY
Start: 2022-08-12 | End: 1900-01-01

## 2022-08-19 RX ORDER — PEN NEEDLE, DIABETIC 31 GX5/16"
NEEDLE, DISPOSABLE MISCELLANEOUS
Qty: 1 | Refills: 2 | Status: ACTIVE | COMMUNITY
Start: 2022-08-18 | End: 1900-01-01

## 2022-08-30 ENCOUNTER — RX RENEWAL (OUTPATIENT)
Age: 67
End: 2022-08-30

## 2022-09-07 ENCOUNTER — RX RENEWAL (OUTPATIENT)
Age: 67
End: 2022-09-07

## 2022-09-07 RX ORDER — BLOOD-GLUCOSE METER
W/DEVICE KIT MISCELLANEOUS
Qty: 1 | Refills: 0 | Status: ACTIVE | COMMUNITY
Start: 2022-09-07 | End: 1900-01-01

## 2022-09-12 ENCOUNTER — APPOINTMENT (OUTPATIENT)
Dept: ENDOCRINOLOGY | Facility: CLINIC | Age: 67
End: 2022-09-12

## 2022-09-12 PROCEDURE — G0108 DIAB MANAGE TRN  PER INDIV: CPT

## 2022-09-19 ENCOUNTER — APPOINTMENT (OUTPATIENT)
Dept: CARDIOLOGY | Facility: CLINIC | Age: 67
End: 2022-09-19

## 2022-09-19 VITALS
HEIGHT: 69 IN | DIASTOLIC BLOOD PRESSURE: 71 MMHG | TEMPERATURE: 98 F | HEART RATE: 103 BPM | BODY MASS INDEX: 27.99 KG/M2 | WEIGHT: 189 LBS | OXYGEN SATURATION: 98 % | SYSTOLIC BLOOD PRESSURE: 117 MMHG

## 2022-09-19 VITALS — DIASTOLIC BLOOD PRESSURE: 71 MMHG | SYSTOLIC BLOOD PRESSURE: 117 MMHG

## 2022-09-19 PROCEDURE — 99215 OFFICE O/P EST HI 40 MIN: CPT

## 2022-09-19 NOTE — DISCUSSION/SUMMARY
[FreeTextEntry1] : 67F with CAD/PCI to RCA, HLD, presents for f/u\par \par 1. CAD\par -s/p PCI to RCA 11/2020\par -pt feeling well, denies cp\par -cont asa lipitor 80 lopressor 25\par \par 2. carotid plaque \par -cont asa, statin\par -asymptomatic, will monitor\par \par 3. HTN\par -controlled on current regimen of lopressor, lisinopril and hctz\par \par 4. LUE numbness\par -completely resolved. CT head and MRI done in ER both negative. \par \par \par \par f/u 5 months unless requires sooner\par >40 min spent on complete encounter.\par all Q's answered.

## 2022-09-19 NOTE — HISTORY OF PRESENT ILLNESS
[FreeTextEntry1] : 67F with HLD, CAD/PCI presents for f/u\par PMD Dr Concetta Valdivia/Dr Lola Flores.\par pulm: Dr Tea Zhu\par \par previously, \par pt initially with ENCINAS 9/2020, echo done at that time revealed grossly normal LV systolic function, mild LVH, MAC and AV calcification with normal opening. Carotid studies revealed b/l 16-49% stenosis. pt was sent for a nuclear stress test which revealed medium sized moderate defects in the distal inferior, inferolateral walls that were reversible. and pt was sent for cardiac cath (11/2020) where she Recieved a 3mm x 24mm JAE to a 90% RCA lesion. TTE did reveal increased RV wall thickness, pt had cMRI, which showed no evidence of myocarditis, cardiomyopathy, or ARVD. She also had a pulm eval with a CT chest without evidence of parenchymal lung dz, no PE, and PFTs showing mild restrictive pattern and pt was placed on albuterol inhalers. \par \par pt later seen in cardiology 11/21. at that time pt overall feeling well, still with occasional cp, unchanged from her prev symptoms but she is "able to handle it" pt with significant stress at work, unable to tolerate her boss, stated she felt like she was "not allowed" to quit. bp cont to be elevated, on lopressor 25 bid, and lisinopril 20, and about to start hctz 12.5. \par pt last seen 8/22, endorsing acute onset of LUE numbness (no LE or facial involvement, no motor deficits) pt advised to go ro the ER to eval for TIA. CT head and MRI both showing no cerebral infarct or ischemia. \par \par pt now presents for follow up.\par today,\par \par pt overall feeling well. very stressed overall. pt recently started on trulicity. \par pt denies cp sob palpitations. no LE edema, syncope.  persistent chronic back pain. \par \par pt not checking bp at home, but states full compliance with her meds, pt on lopressor, lisinopril-hctz\par \par \par CAD\par s/p PCI on asa, plavix, stent was 11/2020. \par \par \par \par recent labs 8/14/2020: tot chol 205 tg 102 hdl 79 ldl 105 Cr 0.58 a1c 6.3\par \par pt received covid vaccine (pfizer)\par \par Med hx: HLD, "heart murmur", chronic back pain, thyroid nodules\par OBGYN hx: does not have children. Currently post menopause\par Sx hx: back sx, hand sx, cataract, hip replacement, removal of benign parotid gland tumor.\par Fam hx: M: breast CA, F: PPM\par Social hx: lives in [place], with twin sister, . quit tob 3/2020 (30 pk yr hx), drinks wine a couple times a week. denies drug use. \par Meds: lipitor 80 protonix bid, cimetidine, asa,lopressor 25 bid. lisinopril-HCTZ 20-12.5 glimiperide 1 mg bid, albuterol inhaler. trulicity\par Allergies: neosporin, bacitracin (rash). \par

## 2022-09-19 NOTE — PHYSICAL EXAM
[Well Developed] : well developed [Well Nourished] : well nourished [No Acute Distress] : no acute distress [Normal Venous Pressure] : normal venous pressure [Normal S1, S2] : normal S1, S2 [No Murmur] : no murmur [Clear Lung Fields] : clear lung fields [Good Air Entry] : good air entry [No Respiratory Distress] : no respiratory distress  [Soft] : abdomen soft [Normal Gait] : normal gait [No Edema] : no edema [Alert and Oriented] : alert and oriented [de-identified] : CN II through XII grossly in tact

## 2022-09-28 ENCOUNTER — APPOINTMENT (OUTPATIENT)
Dept: NEUROSURGERY | Facility: CLINIC | Age: 67
End: 2022-09-28

## 2022-09-28 VITALS
SYSTOLIC BLOOD PRESSURE: 130 MMHG | OXYGEN SATURATION: 98 % | DIASTOLIC BLOOD PRESSURE: 77 MMHG | HEART RATE: 80 BPM | HEIGHT: 69 IN | BODY MASS INDEX: 27.85 KG/M2 | TEMPERATURE: 97.6 F | WEIGHT: 188 LBS

## 2022-09-28 PROCEDURE — 99204 OFFICE O/P NEW MOD 45 MIN: CPT

## 2022-09-30 ENCOUNTER — RX RENEWAL (OUTPATIENT)
Age: 67
End: 2022-09-30

## 2022-09-30 RX ORDER — SODIUM CHLORIDE 0.65 %
0.65 AEROSOL, SPRAY (ML) NASAL TWICE DAILY
Qty: 1 | Refills: 1 | Status: ACTIVE | COMMUNITY
Start: 2022-03-14 | End: 1900-01-01

## 2022-10-02 ENCOUNTER — RX RENEWAL (OUTPATIENT)
Age: 67
End: 2022-10-02

## 2022-10-03 ENCOUNTER — RX RENEWAL (OUTPATIENT)
Age: 67
End: 2022-10-03

## 2022-10-03 NOTE — ASSESSMENT
[FreeTextEntry1] : 2022\par \par \par Re:	Sherley Padilla \par :	1955\par \par The patient is a 67-year-old right-handed female who has known she has had a meningioma in the past and saw me over a decade ago.  I have no record of that at the present time.  She told me I had asked her to follow up and that there was no surgery indicated.  She did not follow up.  Recently, she had tingling in her left arm which brought her to the [facility] ER.  A CT scan led to an MRI.  I reviewed all this imaging, and there is a round, small, right high frontal parafalcine calcified lesion consistent with dural thickening or a small meningioma.  There is no subjacent edema or FLAIR or T2 changes or mass effect.  These films were performed at the end of August.\par \par Her past medical history is positive for type 2 diabetes, hypertension, and hypercholesterolemia.  She has had surgery on both hips and a lumbar discectomy.  She is allergic to Neosporin and has an old history of cigarette smoking.  She quit in .  She is a part-time .  Her family history is positive for a sister who had a meningioma resected by myself.  She has a normal neurological examination.  There are no focal deficits.\par \par IMPRESSION: This small lesion is incidental and should be managed conservatively.  I see no indication whatsoever for any further workup at the present time, and I will see her in 2 years’ time with a followup MRI.\par \par \par \par John San M.D., F.A.C.S.\par [facility]\par

## 2022-10-03 NOTE — HISTORY OF PRESENT ILLNESS
[de-identified] : 67 year old right handed female with PMH of HTN, HLD, DMT2 presents to the office for a neurosurgical consultation for a known meningioma. Patient was seen in our office over 10 years ago for the meningioma. On 9/9/2022, patient presented to [facility] ED for complaints of numbness and tingling in LUE. Work up including CTA brain were negative for aneurysm and vascular malformation. MRI brain showed 1 cm calcified meningioma in right anterior parafalcine region. \par Patient was discharged to home and instructed to fu in our practice. \par Today, patient presents to discuss MRI findings. Patient denies headaches, dizziness, vision, or speech difficulty. \par \par Plan: MRI w wo brain in 2 years

## 2022-10-05 ENCOUNTER — NON-APPOINTMENT (OUTPATIENT)
Age: 67
End: 2022-10-05

## 2022-10-06 ENCOUNTER — APPOINTMENT (OUTPATIENT)
Dept: ULTRASOUND IMAGING | Facility: CLINIC | Age: 67
End: 2022-10-06

## 2022-10-06 ENCOUNTER — APPOINTMENT (OUTPATIENT)
Dept: MAMMOGRAPHY | Facility: CLINIC | Age: 67
End: 2022-10-06

## 2022-10-06 ENCOUNTER — RESULT REVIEW (OUTPATIENT)
Age: 67
End: 2022-10-06

## 2022-10-06 ENCOUNTER — APPOINTMENT (OUTPATIENT)
Dept: RADIOLOGY | Facility: CLINIC | Age: 67
End: 2022-10-06

## 2022-10-06 PROCEDURE — 73030 X-RAY EXAM OF SHOULDER: CPT | Mod: LT

## 2022-10-06 PROCEDURE — 77063 BREAST TOMOSYNTHESIS BI: CPT

## 2022-10-06 PROCEDURE — 76641 ULTRASOUND BREAST COMPLETE: CPT | Mod: 50

## 2022-10-06 PROCEDURE — 77081 DXA BONE DENSITY APPENDICULR: CPT

## 2022-10-06 PROCEDURE — 76536 US EXAM OF HEAD AND NECK: CPT

## 2022-10-06 PROCEDURE — 93880 EXTRACRANIAL BILAT STUDY: CPT

## 2022-10-06 PROCEDURE — 77067 SCR MAMMO BI INCL CAD: CPT

## 2022-10-07 ENCOUNTER — TRANSCRIPTION ENCOUNTER (OUTPATIENT)
Age: 67
End: 2022-10-07

## 2022-10-10 ENCOUNTER — APPOINTMENT (OUTPATIENT)
Dept: MAMMOGRAPHY | Facility: CLINIC | Age: 67
End: 2022-10-10
Payer: MEDICARE

## 2022-10-10 ENCOUNTER — RESULT REVIEW (OUTPATIENT)
Age: 67
End: 2022-10-10

## 2022-10-10 ENCOUNTER — APPOINTMENT (OUTPATIENT)
Dept: ULTRASOUND IMAGING | Facility: CLINIC | Age: 67
End: 2022-10-10

## 2022-10-10 PROCEDURE — 77065 DX MAMMO INCL CAD UNI: CPT | Mod: RT

## 2022-10-10 PROCEDURE — 76642 ULTRASOUND BREAST LIMITED: CPT | Mod: LT

## 2022-10-10 PROCEDURE — G0279: CPT | Mod: LT

## 2022-10-21 ENCOUNTER — RX RENEWAL (OUTPATIENT)
Age: 67
End: 2022-10-21

## 2022-11-01 ENCOUNTER — RX RENEWAL (OUTPATIENT)
Age: 67
End: 2022-11-01

## 2022-12-01 ENCOUNTER — OFFICE (OUTPATIENT)
Dept: URBAN - METROPOLITAN AREA CLINIC 109 | Facility: CLINIC | Age: 67
Setting detail: OPHTHALMOLOGY
End: 2022-12-01
Payer: COMMERCIAL

## 2022-12-01 DIAGNOSIS — H16.223: ICD-10-CM

## 2022-12-01 DIAGNOSIS — H02.403: ICD-10-CM

## 2022-12-01 DIAGNOSIS — H53.40: ICD-10-CM

## 2022-12-01 PROCEDURE — 99213 OFFICE O/P EST LOW 20 MIN: CPT | Performed by: OPHTHALMOLOGY

## 2022-12-01 PROCEDURE — 83861 MICROFLUID ANALY TEARS: CPT | Performed by: OPHTHALMOLOGY

## 2022-12-01 ASSESSMENT — LID POSITION - PTOSIS
OD_PTOSIS: RUL 2+
OS_PTOSIS: LUL 2+

## 2022-12-01 ASSESSMENT — SUPERFICIAL PUNCTATE KERATITIS (SPK)
OS_SPK: 2+
OD_SPK: 2+

## 2022-12-01 ASSESSMENT — CONFRONTATIONAL VISUAL FIELD TEST (CVF)
OD_FINDINGS: FULL
OS_FINDINGS: FULL

## 2022-12-01 ASSESSMENT — KERATOMETRY
OS_K2POWER_DIOPTERS: 4.25
OS_AXISANGLE_DEGREES: 133
OD_K2POWER_DIOPTERS: 43.75
OD_K1POWER_DIOPTERS: 44.25
OS_K1POWER_DIOPTERS: 43.25
OD_AXISANGLE_DEGREES: 69

## 2022-12-01 ASSESSMENT — REFRACTION_AUTOREFRACTION
OS_SPHERE: +0.25
OD_AXIS: 063
OD_CYLINDER: -0.50
OS_CYLINDER: -0.75
OS_AXIS: 070
OD_SPHERE: +0.50

## 2022-12-01 ASSESSMENT — SPHEQUIV_DERIVED
OD_SPHEQUIV: 0.25
OS_SPHEQUIV: -0.125

## 2022-12-01 ASSESSMENT — VISUAL ACUITY
OD_BCVA: 20/30-2
OS_BCVA: 20/100-1

## 2022-12-01 ASSESSMENT — AXIALLENGTH_DERIVED
OS_AL: 34.1692
OD_AL: 23.3142

## 2022-12-02 ENCOUNTER — RX RENEWAL (OUTPATIENT)
Age: 67
End: 2022-12-02

## 2022-12-05 ENCOUNTER — APPOINTMENT (OUTPATIENT)
Dept: INTERNAL MEDICINE | Facility: CLINIC | Age: 67
End: 2022-12-05

## 2022-12-05 VITALS
SYSTOLIC BLOOD PRESSURE: 148 MMHG | BODY MASS INDEX: 27.99 KG/M2 | RESPIRATION RATE: 16 BRPM | HEIGHT: 69 IN | OXYGEN SATURATION: 98 % | DIASTOLIC BLOOD PRESSURE: 83 MMHG | HEART RATE: 84 BPM | WEIGHT: 189 LBS

## 2022-12-05 VITALS — DIASTOLIC BLOOD PRESSURE: 78 MMHG | SYSTOLIC BLOOD PRESSURE: 122 MMHG

## 2022-12-05 DIAGNOSIS — R20.0 ANESTHESIA OF SKIN: ICD-10-CM

## 2022-12-05 DIAGNOSIS — Z23 ENCOUNTER FOR IMMUNIZATION: ICD-10-CM

## 2022-12-05 PROCEDURE — 99215 OFFICE O/P EST HI 40 MIN: CPT | Mod: 25

## 2022-12-05 PROCEDURE — 36415 COLL VENOUS BLD VENIPUNCTURE: CPT

## 2022-12-06 ENCOUNTER — OFFICE (OUTPATIENT)
Dept: URBAN - METROPOLITAN AREA CLINIC 12 | Facility: CLINIC | Age: 67
Setting detail: OPHTHALMOLOGY
End: 2022-12-06
Payer: COMMERCIAL

## 2022-12-06 DIAGNOSIS — Z20.822: ICD-10-CM

## 2022-12-06 DIAGNOSIS — Z01.812: ICD-10-CM

## 2022-12-06 LAB
ALBUMIN SERPL ELPH-MCNC: 4.6 G/DL
ALP BLD-CCNC: 178 U/L
ALT SERPL-CCNC: 136 U/L
ANION GAP SERPL CALC-SCNC: 13 MMOL/L
APTT BLD: 31.8 SEC
AST SERPL-CCNC: 65 U/L
BASOPHILS # BLD AUTO: 0.04 K/UL
BASOPHILS NFR BLD AUTO: 0.5 %
BILIRUB SERPL-MCNC: 0.4 MG/DL
BUN SERPL-MCNC: 16 MG/DL
CALCIUM SERPL-MCNC: 10 MG/DL
CHLORIDE SERPL-SCNC: 104 MMOL/L
CHOLEST SERPL-MCNC: 209 MG/DL
CO2 SERPL-SCNC: 19 MMOL/L
CREAT SERPL-MCNC: 0.59 MG/DL
EGFR: 99 ML/MIN/1.73M2
EOSINOPHIL # BLD AUTO: 0.21 K/UL
EOSINOPHIL NFR BLD AUTO: 2.6 %
ESTIMATED AVERAGE GLUCOSE: 143 MG/DL
GLUCOSE SERPL-MCNC: 136 MG/DL
HBA1C MFR BLD HPLC: 6.6 %
HCT VFR BLD CALC: 38.4 %
HDLC SERPL-MCNC: 64 MG/DL
HGB BLD-MCNC: 13.2 G/DL
IMM GRANULOCYTES NFR BLD AUTO: 0.4 %
INR PPP: 0.94 RATIO
LDLC SERPL CALC-MCNC: 116 MG/DL
LYMPHOCYTES # BLD AUTO: 1.56 K/UL
LYMPHOCYTES NFR BLD AUTO: 19.5 %
MAN DIFF?: NORMAL
MCHC RBC-ENTMCNC: 31.5 PG
MCHC RBC-ENTMCNC: 34.4 GM/DL
MCV RBC AUTO: 91.6 FL
MONOCYTES # BLD AUTO: 0.78 K/UL
MONOCYTES NFR BLD AUTO: 9.8 %
NEUTROPHILS # BLD AUTO: 5.38 K/UL
NEUTROPHILS NFR BLD AUTO: 67.2 %
NONHDLC SERPL-MCNC: 145 MG/DL
PLATELET # BLD AUTO: 378 K/UL
POTASSIUM SERPL-SCNC: 3.9 MMOL/L
PROT SERPL-MCNC: 7.2 G/DL
PT BLD: 10.9 SEC
RBC # BLD: 4.19 M/UL
RBC # FLD: 11.8 %
SODIUM SERPL-SCNC: 136 MMOL/L
TRIGL SERPL-MCNC: 143 MG/DL
WBC # FLD AUTO: 8 K/UL

## 2022-12-06 PROCEDURE — 99211 OFF/OP EST MAY X REQ PHY/QHP: CPT | Performed by: OPHTHALMOLOGY

## 2022-12-09 ENCOUNTER — ASC (OUTPATIENT)
Dept: URBAN - METROPOLITAN AREA SURGERY 8 | Facility: SURGERY | Age: 67
Setting detail: OPHTHALMOLOGY
End: 2022-12-09
Payer: COMMERCIAL

## 2022-12-09 DIAGNOSIS — H02.403: ICD-10-CM

## 2022-12-09 PROCEDURE — 67904 REPAIR EYELID DEFECT: CPT | Performed by: OPHTHALMOLOGY

## 2022-12-15 ENCOUNTER — OFFICE (OUTPATIENT)
Dept: URBAN - METROPOLITAN AREA CLINIC 109 | Facility: CLINIC | Age: 67
Setting detail: OPHTHALMOLOGY
End: 2022-12-15
Payer: COMMERCIAL

## 2022-12-15 ENCOUNTER — RX ONLY (RX ONLY)
Age: 67
End: 2022-12-15

## 2022-12-15 DIAGNOSIS — H02.403: ICD-10-CM

## 2022-12-15 DIAGNOSIS — H16.223: ICD-10-CM

## 2022-12-15 PROCEDURE — 99024 POSTOP FOLLOW-UP VISIT: CPT | Performed by: OPHTHALMOLOGY

## 2022-12-15 ASSESSMENT — SUPERFICIAL PUNCTATE KERATITIS (SPK)
OS_SPK: 2+
OD_SPK: 2+

## 2022-12-15 ASSESSMENT — LID POSITION - PTOSIS
OD_PTOSIS: ABSENT
OS_PTOSIS: ABSENT

## 2022-12-15 ASSESSMENT — KERATOMETRY
OS_K1POWER_DIOPTERS: 43.25
OS_AXISANGLE_DEGREES: 133
OD_AXISANGLE_DEGREES: 69
OD_K1POWER_DIOPTERS: 44.25
OD_K2POWER_DIOPTERS: 43.75
OS_K2POWER_DIOPTERS: 4.25

## 2022-12-15 ASSESSMENT — REFRACTION_AUTOREFRACTION
OD_AXIS: 063
OS_CYLINDER: -0.75
OD_SPHERE: +0.50
OD_CYLINDER: -0.50
OS_AXIS: 070
OS_SPHERE: +0.25

## 2022-12-15 ASSESSMENT — SPHEQUIV_DERIVED
OD_SPHEQUIV: 0.25
OS_SPHEQUIV: -0.125

## 2022-12-15 ASSESSMENT — VISUAL ACUITY
OS_BCVA: 20/200
OD_BCVA: 20/40

## 2022-12-15 ASSESSMENT — CONFRONTATIONAL VISUAL FIELD TEST (CVF)
OS_FINDINGS: FULL
OD_FINDINGS: FULL

## 2022-12-15 ASSESSMENT — AXIALLENGTH_DERIVED
OS_AL: 34.1692
OD_AL: 23.3142

## 2022-12-15 ASSESSMENT — LID POSITION - COMMENTS
OD_COMMENTS: WOUND C/D/I HEALING WELLGOOD HEIGHT AND GOOD SYMMETRY
OS_COMMENTS: WOUND C/D/I HEALING WELLGOOD HEIGHT AND GOOD SYMMETRY

## 2022-12-16 ENCOUNTER — RX RENEWAL (OUTPATIENT)
Age: 67
End: 2022-12-16

## 2022-12-18 ENCOUNTER — RX RENEWAL (OUTPATIENT)
Age: 67
End: 2022-12-18

## 2022-12-19 ENCOUNTER — RX RENEWAL (OUTPATIENT)
Age: 67
End: 2022-12-19

## 2022-12-20 PROBLEM — Z01.812 ENCOUNTER FOR PREPROCEDURAL LABORATORY EXAMINATION: Status: ACTIVE | Noted: 2022-12-06

## 2023-01-03 ASSESSMENT — LID POSITION - PTOSIS
OS_PTOSIS: ABSENT
OD_PTOSIS: ABSENT

## 2023-01-03 ASSESSMENT — VISUAL ACUITY
OS_BCVA: 20/100-1
OD_BCVA: 20/30-2

## 2023-01-03 ASSESSMENT — SUPERFICIAL PUNCTATE KERATITIS (SPK)
OD_SPK: 2+
OS_SPK: 2+

## 2023-01-19 ASSESSMENT — AXIALLENGTH_DERIVED
OS_AL: 34.1692
OD_AL: 23.3142

## 2023-01-19 ASSESSMENT — KERATOMETRY
OS_K1POWER_DIOPTERS: 43.25
OD_AXISANGLE_DEGREES: 69
OS_AXISANGLE_DEGREES: 133
OD_K2POWER_DIOPTERS: 43.75
OS_K2POWER_DIOPTERS: 4.25
OD_K1POWER_DIOPTERS: 44.25

## 2023-01-19 ASSESSMENT — REFRACTION_AUTOREFRACTION
OS_CYLINDER: -0.75
OD_AXIS: 063
OS_SPHERE: +0.25
OD_SPHERE: +0.50
OS_AXIS: 070
OD_CYLINDER: -0.50

## 2023-01-19 ASSESSMENT — SPHEQUIV_DERIVED
OD_SPHEQUIV: 0.25
OS_SPHEQUIV: -0.125

## 2023-01-20 ENCOUNTER — OFFICE (OUTPATIENT)
Dept: URBAN - METROPOLITAN AREA CLINIC 93 | Facility: CLINIC | Age: 68
Setting detail: OPHTHALMOLOGY
End: 2023-01-20
Payer: COMMERCIAL

## 2023-01-20 DIAGNOSIS — H40.1124: ICD-10-CM

## 2023-01-20 DIAGNOSIS — H52.4: ICD-10-CM

## 2023-01-20 DIAGNOSIS — H16.223: ICD-10-CM

## 2023-01-20 DIAGNOSIS — H40.1111: ICD-10-CM

## 2023-01-20 PROCEDURE — 92015 DETERMINE REFRACTIVE STATE: CPT | Performed by: OPHTHALMOLOGY

## 2023-01-20 PROCEDURE — 92012 INTRM OPH EXAM EST PATIENT: CPT | Performed by: OPHTHALMOLOGY

## 2023-01-20 ASSESSMENT — REFRACTION_MANIFEST
OD_VA1: 20/50-
OS_VA1: 20/25
OD_CYLINDER: -0.50
OS_ADD: +2.50
OD_ADD: +2.50
OS_AXIS: 010
OD_AXIS: 090
OS_SPHERE: PLANO
OS_CYLINDER: -1.00
OD_SPHERE: +0.25

## 2023-01-20 ASSESSMENT — TONOMETRY
OD_IOP_MMHG: 16
OS_IOP_MMHG: 14

## 2023-01-20 ASSESSMENT — REFRACTION_AUTOREFRACTION
OD_SPHERE: +0.50
OS_AXIS: 011
OS_SPHERE: PLANO
OD_CYLINDER: -0.50
OS_CYLINDER: -1.00
OD_AXIS: 088

## 2023-01-20 ASSESSMENT — VISUAL ACUITY
OS_BCVA: 20/70-2
OD_BCVA: 20/20

## 2023-01-20 ASSESSMENT — SUPERFICIAL PUNCTATE KERATITIS (SPK)
OD_SPK: 2+
OS_SPK: 2+

## 2023-01-20 ASSESSMENT — KERATOMETRY
OS_K2POWER_DIOPTERS: 44.75
OS_K1POWER_DIOPTERS: 43.75
OS_AXISANGLE_DEGREES: 097
OD_K1POWER_DIOPTERS: 44.00
OD_AXISANGLE_DEGREES: 59
OD_K2POWER_DIOPTERS: 43.75

## 2023-01-20 ASSESSMENT — CONFRONTATIONAL VISUAL FIELD TEST (CVF)
OS_FINDINGS: FULL
OD_FINDINGS: FULL

## 2023-01-20 ASSESSMENT — SPHEQUIV_DERIVED
OD_SPHEQUIV: 0.25
OD_SPHEQUIV: 0

## 2023-01-20 ASSESSMENT — LID POSITION - PTOSIS
OD_PTOSIS: ABSENT
OS_PTOSIS: ABSENT

## 2023-01-20 ASSESSMENT — AXIALLENGTH_DERIVED
OD_AL: 23.3591
OD_AL: 23.455

## 2023-01-27 ENCOUNTER — NON-APPOINTMENT (OUTPATIENT)
Age: 68
End: 2023-01-27

## 2023-01-27 ENCOUNTER — APPOINTMENT (OUTPATIENT)
Dept: CARDIOLOGY | Facility: CLINIC | Age: 68
End: 2023-01-27
Payer: MEDICARE

## 2023-01-27 VITALS
OXYGEN SATURATION: 100 % | DIASTOLIC BLOOD PRESSURE: 77 MMHG | BODY MASS INDEX: 27.85 KG/M2 | WEIGHT: 188 LBS | SYSTOLIC BLOOD PRESSURE: 155 MMHG | HEIGHT: 69 IN | HEART RATE: 69 BPM | TEMPERATURE: 98 F

## 2023-01-27 PROCEDURE — 93000 ELECTROCARDIOGRAM COMPLETE: CPT

## 2023-01-27 PROCEDURE — 99215 OFFICE O/P EST HI 40 MIN: CPT

## 2023-01-27 NOTE — HISTORY OF PRESENT ILLNESS
[FreeTextEntry1] : 67F with HLD, CAD/PCI presents for f/u\par PMD Dr Concetta Valdivia/Dr Lola Flores.\par pulm: Dr Tea Zhu\par \par previously, \par pt initially with ENCINAS 9/2020, echo done at that time revealed grossly normal LV systolic function, mild LVH, MAC and AV calcification with normal opening. Carotid studies revealed b/l 16-49% stenosis. pt was sent for a nuclear stress test which revealed medium sized moderate defects in the distal inferior, inferolateral walls that were reversible. and pt was sent for cardiac cath (11/2020) where she Recieved a 3mm x 24mm JAE to a 90% RCA lesion. TTE did reveal increased RV wall thickness, pt had cMRI, which showed no evidence of myocarditis, cardiomyopathy, or ARVD. She also had a pulm eval with a CT chest without evidence of parenchymal lung dz, no PE, and PFTs showing mild restrictive pattern and pt was placed on albuterol inhalers. \par \par pt later seen in cardiology 11/21. at that time pt overall feeling well, still with occasional cp, unchanged from her prev symptoms but she is "able to handle it" pt with significant stress at work, unable to tolerate her boss, stated she felt like she was "not allowed" to quit. bp cont to be elevated, on lopressor 25 bid, and lisinopril 20, and about to start hctz 12.5. \par 8/22, endorsing acute onset of LUE numbness (no LE or facial involvement, no motor deficits) pt advised to go to the ER to eval for TIA. CT head and MRI both showing no cerebral infarct or ischemia. \par last seen 9/22, feeling well. no complaints. \par \par pt now presents for follow up.\par today,\par \par \par pt feeling well. very stressed overall.\par pt denies cp sob palpitations. no LE edema, syncope. persistent chronic back pain. \par bp 155/77 here today, states at home, usually gets around 120s. states full compliance with her meds, pt on lopressor, lisinopril-hctz\par \par \par CAD\par s/p PCI on asa, plavix, stent was 11/2020. \par \par recent labs 8/14/2020: tot chol 205 tg 102 hdl 79 ldl 105 Cr 0.58 a1c 6.3\par \par pt received covid vaccine (pfizer)\par \par Med hx: HLD, "heart murmur", chronic back pain, thyroid nodules\par OBGYN hx: does not have children. Currently post menopause\par Sx hx: back sx, hand sx, cataract, hip replacement, removal of benign parotid gland tumor.\par Fam hx: M: breast CA, F: PPM\par Social hx: lives in [place], with twin sister, . quit tob 3/2020 (30 pk yr hx), drinks wine a couple times a week. denies drug use. \par Meds: lipitor 80 protonix bid, cimetidine, asa, lopressor 25 bid. lisinopril-HCTZ 20-12.5 glimiperide 1 mg bid, albuterol inhaler. trulicity\par Allergies: neosporin, bacitracin (rash). \par \par

## 2023-01-27 NOTE — PHYSICAL EXAM
[Well Developed] : well developed [Well Nourished] : well nourished [No Acute Distress] : no acute distress [Normal Venous Pressure] : normal venous pressure [Normal S1, S2] : normal S1, S2 [Clear Lung Fields] : clear lung fields [Good Air Entry] : good air entry [Soft] : abdomen soft [Non Tender] : non-tender [No Edema] : no edema [Moves all extremities] : moves all extremities [No Focal Deficits] : no focal deficits [Alert and Oriented] : alert and oriented [de-identified] : 3/6 blowing systolic ejection murmur LUSB

## 2023-01-27 NOTE — DISCUSSION/SUMMARY
[FreeTextEntry1] : 67F with HLD, CAD/PCI presents for f/u\par \par 1. CAD\par -s/p PCI to RCA 11/2020\par -pt feeling well, denies cp\par -cont asa lipitor 80 lopressor 25\par \par 2. carotid plaque \par -cont asa, statin\par -asymptomatic, will monitor\par \par 3. HTN\par -mostly controlled on current regimen of lopressor, lisinopril and hctz\par \par 4. murmur\par -will repeat echo  \par \par f/u 5 months unless requires sooner\par >40 min spent on complete encounter.\par all Q's answered. \par  [EKG obtained to assist in diagnosis and management of assessed problem(s)] : EKG obtained to assist in diagnosis and management of assessed problem(s)

## 2023-01-30 ENCOUNTER — OFFICE (OUTPATIENT)
Dept: URBAN - METROPOLITAN AREA CLINIC 93 | Facility: CLINIC | Age: 68
Setting detail: OPHTHALMOLOGY
End: 2023-01-30
Payer: COMMERCIAL

## 2023-01-30 DIAGNOSIS — H02.403: ICD-10-CM

## 2023-01-30 DIAGNOSIS — H16.223: ICD-10-CM

## 2023-01-30 PROCEDURE — 99024 POSTOP FOLLOW-UP VISIT: CPT | Performed by: OPHTHALMOLOGY

## 2023-01-30 ASSESSMENT — VISUAL ACUITY
OD_BCVA: 20/30-1
OS_BCVA: 20/100+2

## 2023-01-30 ASSESSMENT — REFRACTION_AUTOREFRACTION
OS_CYLINDER: -1.00
OD_AXIS: 088
OD_CYLINDER: -0.50
OD_SPHERE: +0.50
OS_AXIS: 011
OS_SPHERE: PLANO

## 2023-01-30 ASSESSMENT — CONFRONTATIONAL VISUAL FIELD TEST (CVF)
OD_FINDINGS: FULL
OS_FINDINGS: FULL

## 2023-01-30 ASSESSMENT — AXIALLENGTH_DERIVED: OD_AL: 23.3591

## 2023-01-30 ASSESSMENT — KERATOMETRY
OD_K1POWER_DIOPTERS: 44.00
OS_K1POWER_DIOPTERS: 43.75
OS_AXISANGLE_DEGREES: 097
OD_AXISANGLE_DEGREES: 59
OD_K2POWER_DIOPTERS: 43.75
OS_K2POWER_DIOPTERS: 44.75

## 2023-01-30 ASSESSMENT — LID POSITION - PTOSIS
OD_PTOSIS: ABSENT
OS_PTOSIS: ABSENT

## 2023-01-30 ASSESSMENT — LID POSITION - COMMENTS
OS_COMMENTS: WOUND C/D/I  GOOD HEIGHT AND GOOD SYMMETRY
OD_COMMENTS: WOUND C/D/I  GOOD HEIGHT AND GOOD SYMMETRY

## 2023-01-30 ASSESSMENT — SUPERFICIAL PUNCTATE KERATITIS (SPK)
OD_SPK: 2+
OS_SPK: 2+

## 2023-01-30 ASSESSMENT — SPHEQUIV_DERIVED: OD_SPHEQUIV: 0.25

## 2023-02-06 ENCOUNTER — APPOINTMENT (OUTPATIENT)
Dept: CARDIOLOGY | Facility: CLINIC | Age: 68
End: 2023-02-06
Payer: MEDICARE

## 2023-02-06 PROCEDURE — 93306 TTE W/DOPPLER COMPLETE: CPT

## 2023-02-24 NOTE — ASU DISCHARGE PLAN (ADULT/PEDIATRIC). - FOR NEXT 12 HOURS DO NOT:
Pt has an emotional support dog   Mom will be renting a house soon but needs a note from the doctor for the dog stating it is an emotional support animal 
Statement Selected

## 2023-03-14 ENCOUNTER — RX RENEWAL (OUTPATIENT)
Age: 68
End: 2023-03-14

## 2023-03-29 ENCOUNTER — RX RENEWAL (OUTPATIENT)
Age: 68
End: 2023-03-29

## 2023-04-10 ENCOUNTER — TRANSCRIPTION ENCOUNTER (OUTPATIENT)
Age: 68
End: 2023-04-10

## 2023-04-10 ENCOUNTER — RESULT REVIEW (OUTPATIENT)
Age: 68
End: 2023-04-10

## 2023-04-10 ENCOUNTER — APPOINTMENT (OUTPATIENT)
Dept: MAMMOGRAPHY | Facility: CLINIC | Age: 68
End: 2023-04-10
Payer: MEDICARE

## 2023-04-10 ENCOUNTER — APPOINTMENT (OUTPATIENT)
Dept: ULTRASOUND IMAGING | Facility: CLINIC | Age: 68
End: 2023-04-10

## 2023-04-10 DIAGNOSIS — N64.89 OTHER SPECIFIED DISORDERS OF BREAST: ICD-10-CM

## 2023-04-10 PROCEDURE — G0279: CPT | Mod: LT

## 2023-04-10 PROCEDURE — 77065 DX MAMMO INCL CAD UNI: CPT | Mod: RT

## 2023-04-14 ENCOUNTER — APPOINTMENT (OUTPATIENT)
Dept: INTERNAL MEDICINE | Facility: CLINIC | Age: 68
End: 2023-04-14
Payer: MEDICARE

## 2023-04-14 VITALS
WEIGHT: 189 LBS | HEART RATE: 70 BPM | HEIGHT: 69 IN | TEMPERATURE: 98 F | BODY MASS INDEX: 27.99 KG/M2 | OXYGEN SATURATION: 97 % | DIASTOLIC BLOOD PRESSURE: 78 MMHG | SYSTOLIC BLOOD PRESSURE: 127 MMHG

## 2023-04-14 DIAGNOSIS — Z01.818 ENCOUNTER FOR OTHER PREPROCEDURAL EXAMINATION: ICD-10-CM

## 2023-04-14 DIAGNOSIS — H02.403 UNSPECIFIED PTOSIS OF BILATERAL EYELIDS: ICD-10-CM

## 2023-04-14 PROCEDURE — 99214 OFFICE O/P EST MOD 30 MIN: CPT | Mod: 25

## 2023-04-14 PROCEDURE — 36415 COLL VENOUS BLD VENIPUNCTURE: CPT

## 2023-04-14 RX ORDER — CIMETIDINE 800 MG/1
800 TABLET, FILM COATED ORAL AT BEDTIME
Qty: 90 | Refills: 0 | Status: DISCONTINUED | COMMUNITY
Start: 2020-09-03 | End: 2023-04-14

## 2023-04-14 RX ORDER — LANCETS
EACH MISCELLANEOUS
Qty: 1 | Refills: 3 | Status: ACTIVE | COMMUNITY
Start: 2022-08-19 | End: 1900-01-01

## 2023-04-14 RX ORDER — BLOOD SUGAR DIAGNOSTIC
STRIP MISCELLANEOUS
Qty: 1 | Refills: 3 | Status: ACTIVE | COMMUNITY
Start: 2022-08-16 | End: 1900-01-01

## 2023-04-14 RX ORDER — PANTOPRAZOLE 40 MG/1
40 TABLET, DELAYED RELEASE ORAL
Qty: 30 | Refills: 3 | Status: DISCONTINUED | COMMUNITY
Start: 2019-10-09 | End: 2023-04-14

## 2023-04-17 ENCOUNTER — TRANSCRIPTION ENCOUNTER (OUTPATIENT)
Age: 68
End: 2023-04-17

## 2023-04-17 LAB
ALBUMIN SERPL ELPH-MCNC: 4.5 G/DL
ALP BLD-CCNC: 172 U/L
ALT SERPL-CCNC: 81 U/L
ANION GAP SERPL CALC-SCNC: 14 MMOL/L
APPEARANCE: CLEAR
AST SERPL-CCNC: 45 U/L
BASOPHILS # BLD AUTO: 0.03 K/UL
BASOPHILS NFR BLD AUTO: 0.4 %
BILIRUB SERPL-MCNC: 0.4 MG/DL
BILIRUBIN URINE: NEGATIVE
BLOOD URINE: NEGATIVE
BUN SERPL-MCNC: 22 MG/DL
CALCIUM SERPL-MCNC: 9.7 MG/DL
CHLORIDE SERPL-SCNC: 101 MMOL/L
CHOLEST SERPL-MCNC: 182 MG/DL
CO2 SERPL-SCNC: 19 MMOL/L
COLOR: NORMAL
CREAT SERPL-MCNC: 0.7 MG/DL
EGFR: 95 ML/MIN/1.73M2
EOSINOPHIL # BLD AUTO: 0.14 K/UL
EOSINOPHIL NFR BLD AUTO: 1.8 %
ESTIMATED AVERAGE GLUCOSE: 148 MG/DL
GLUCOSE QUALITATIVE U: NEGATIVE
GLUCOSE SERPL-MCNC: 140 MG/DL
HBA1C MFR BLD HPLC: 6.8 %
HCT VFR BLD CALC: 40.4 %
HDLC SERPL-MCNC: 53 MG/DL
HGB BLD-MCNC: 13.2 G/DL
IMM GRANULOCYTES NFR BLD AUTO: 0.5 %
KETONES URINE: NEGATIVE
LDLC SERPL CALC-MCNC: 100 MG/DL
LEUKOCYTE ESTERASE URINE: NEGATIVE
LYMPHOCYTES # BLD AUTO: 1.42 K/UL
LYMPHOCYTES NFR BLD AUTO: 18.7 %
MAN DIFF?: NORMAL
MCHC RBC-ENTMCNC: 30.6 PG
MCHC RBC-ENTMCNC: 32.7 GM/DL
MCV RBC AUTO: 93.5 FL
MONOCYTES # BLD AUTO: 0.72 K/UL
MONOCYTES NFR BLD AUTO: 9.5 %
NEUTROPHILS # BLD AUTO: 5.23 K/UL
NEUTROPHILS NFR BLD AUTO: 69.1 %
NITRITE URINE: NEGATIVE
NONHDLC SERPL-MCNC: 129 MG/DL
PH URINE: 6
PLATELET # BLD AUTO: 344 K/UL
POTASSIUM SERPL-SCNC: 4.5 MMOL/L
PROT SERPL-MCNC: 7 G/DL
PROTEIN URINE: NEGATIVE
RBC # BLD: 4.32 M/UL
RBC # FLD: 12.2 %
SODIUM SERPL-SCNC: 135 MMOL/L
SPECIFIC GRAVITY URINE: 1.02
TRIGL SERPL-MCNC: 144 MG/DL
TSH SERPL-ACNC: 2.51 UIU/ML
UROBILINOGEN URINE: NORMAL
WBC # FLD AUTO: 7.58 K/UL

## 2023-04-20 ENCOUNTER — APPOINTMENT (OUTPATIENT)
Dept: ORTHOPEDIC SURGERY | Facility: CLINIC | Age: 68
End: 2023-04-20
Payer: MEDICARE

## 2023-04-20 VITALS — HEIGHT: 69 IN | WEIGHT: 189 LBS | BODY MASS INDEX: 27.99 KG/M2

## 2023-04-20 PROCEDURE — 99204 OFFICE O/P NEW MOD 45 MIN: CPT

## 2023-04-20 PROCEDURE — 72070 X-RAY EXAM THORAC SPINE 2VWS: CPT

## 2023-04-20 PROCEDURE — 71100 X-RAY EXAM RIBS UNI 2 VIEWS: CPT | Mod: RT

## 2023-04-20 NOTE — HISTORY OF PRESENT ILLNESS
[Mid-back] : mid-back [10] : 10 [Dull/Aching] : dull/aching [Localized] : localized [Sharp] : sharp [Constant] : constant [Coughing] : coughing [Retired] : Work status: retired [de-identified] : 4/20/23: 68 yo female with right side and back pain since 4/19/23. She reports tripping getting out of bed and fell onto a drawer. She has pain with coughing. There is some bruising. She tried to ice and took Aleve. [] : This patient has had an injection before: no [FreeTextEntry1] : Rt ribs [FreeTextEntry3] : 4/19/23 [FreeTextEntry5] : Patient fell off her bed and hit her night stand on 4/19/23 injured her back and ribs\par H/O chronic back pain, low back surgery

## 2023-04-20 NOTE — ASSESSMENT
[FreeTextEntry1] : Reviewed xrays - minimall displaced rib fx.\par Advised to monitor symptoms.\par Ice, rest.\par Diclofenac prn.\par Follow up in 4 weeks.

## 2023-04-20 NOTE — PHYSICAL EXAM
[] : diminished ROM in all planes [Bending to left] : bending to left [Bending to right] : bending to right [FreeTextEntry3] : small area of ecchymosis R flank [FreeTextEntry8] : tender R Rib 6-8

## 2023-05-18 ENCOUNTER — APPOINTMENT (OUTPATIENT)
Dept: ORTHOPEDIC SURGERY | Facility: CLINIC | Age: 68
End: 2023-05-18

## 2023-05-19 ENCOUNTER — APPOINTMENT (OUTPATIENT)
Dept: ORTHOPEDIC SURGERY | Facility: CLINIC | Age: 68
End: 2023-05-19
Payer: MEDICARE

## 2023-05-19 ENCOUNTER — RX RENEWAL (OUTPATIENT)
Age: 68
End: 2023-05-19

## 2023-05-19 PROCEDURE — 99213 OFFICE O/P EST LOW 20 MIN: CPT

## 2023-05-19 PROCEDURE — 71100 X-RAY EXAM RIBS UNI 2 VIEWS: CPT | Mod: RT

## 2023-05-19 NOTE — HISTORY OF PRESENT ILLNESS
[Mid-back] : mid-back [10] : 10 [Dull/Aching] : dull/aching [Localized] : localized [Sharp] : sharp [Constant] : constant [Coughing] : coughing [Retired] : Work status: retired [de-identified] : 5-19-23- one month s/p right sided rib fracture for f/u xrays\par \par 4/20/23: 68 yo female with right side and back pain since 4/19/23. She reports tripping getting out of bed and fell onto a drawer. She has pain with coughing. There is some bruising. She tried to ice and took Aleve. [] : Post Surgical Visit: no [FreeTextEntry1] : Rt ribs [FreeTextEntry3] : 4/19/23 [FreeTextEntry5] : Patient fell off her bed and hit her night stand on 4/19/23 injured her back and ribs\par H/O chronic back pain, low back surgery

## 2023-05-19 NOTE — PHYSICAL EXAM
[] : diminished ROM in all planes [Bending to left] : bending to left [Bending to right] : bending to right [Right] : right rib [Fracture] : Fracture [FreeTextEntry3] : small area of ecchymosis R flank [FreeTextEntry8] : tender R Rib 6-8 [FreeTextEntry5] : scant callous yet

## 2023-05-19 NOTE — ASSESSMENT
[FreeTextEntry1] : continue activity restrictions: limit bending/twisting and lifting, deep breathing hourly while awake. Tylenol for pain

## 2023-06-15 ENCOUNTER — APPOINTMENT (OUTPATIENT)
Dept: ORTHOPEDIC SURGERY | Facility: CLINIC | Age: 68
End: 2023-06-15
Payer: MEDICARE

## 2023-06-15 VITALS — HEIGHT: 69 IN | WEIGHT: 189 LBS | BODY MASS INDEX: 27.99 KG/M2

## 2023-06-15 DIAGNOSIS — M51.36 OTHER INTERVERTEBRAL DISC DEGENERATION, LUMBAR REGION: ICD-10-CM

## 2023-06-15 PROCEDURE — 71100 X-RAY EXAM RIBS UNI 2 VIEWS: CPT | Mod: RT

## 2023-06-15 PROCEDURE — 72100 X-RAY EXAM L-S SPINE 2/3 VWS: CPT

## 2023-06-15 PROCEDURE — 99213 OFFICE O/P EST LOW 20 MIN: CPT

## 2023-06-15 NOTE — REASON FOR VISIT
[FreeTextEntry2] : 6/15/23- Ribs doing better on right. Has chronic low back pain, can only walk for a few minutes before the right low back pain kicks in

## 2023-06-15 NOTE — HISTORY OF PRESENT ILLNESS
[Mid-back] : mid-back [10] : 10 [Dull/Aching] : dull/aching [Localized] : localized [Sharp] : sharp [Constant] : constant [Coughing] : coughing [Retired] : Work status: retired [de-identified] : 6-15-23- two months s/p right sided rib fracture for f/u xrays, doing better\par \par 4/20/23: 68 yo female with right side and back pain since 4/19/23. She reports tripping getting out of bed and fell onto a drawer. She has pain with coughing. There is some bruising. She tried to ice and took Aleve. [] : Post Surgical Visit: no [FreeTextEntry1] : Rt ribs [FreeTextEntry5] : Patient fell off her bed and hit her night stand on 4/19/23 injured her back and ribs\par H/O chronic back pain, low back surgery  [FreeTextEntry3] : 4/19/23

## 2023-06-15 NOTE — PHYSICAL EXAM
[Bending to left] : bending to left [Bending to right] : bending to right [Right] : right rib [Fracture] : Fracture [Absent] : achilles reflex absent [Disc space narrowing] : Disc space narrowing [] : negative sitting straight leg raise [FreeTextEntry8] : minimally tender R Rib 6-8 [FreeTextEntry1] : ddd L5/S1 [FreeTextEntry5] : improved callous

## 2023-06-20 ENCOUNTER — OFFICE (OUTPATIENT)
Facility: LOCATION | Age: 68
Setting detail: OPHTHALMOLOGY
End: 2023-06-20
Payer: MEDICARE

## 2023-06-20 DIAGNOSIS — D32.0: ICD-10-CM

## 2023-06-20 DIAGNOSIS — H40.1124: ICD-10-CM

## 2023-06-20 DIAGNOSIS — H40.1111: ICD-10-CM

## 2023-06-20 PROCEDURE — 92012 INTRM OPH EXAM EST PATIENT: CPT | Performed by: OPHTHALMOLOGY

## 2023-06-20 PROCEDURE — 92133 CPTRZD OPH DX IMG PST SGM ON: CPT | Performed by: OPHTHALMOLOGY

## 2023-06-20 PROCEDURE — 92083 EXTENDED VISUAL FIELD XM: CPT | Performed by: OPHTHALMOLOGY

## 2023-06-20 ASSESSMENT — SUPERFICIAL PUNCTATE KERATITIS (SPK)
OS_SPK: 2+
OD_SPK: 2+

## 2023-06-20 ASSESSMENT — REFRACTION_AUTOREFRACTION
OS_CYLINDER: -1.00
OD_CYLINDER: -0.50
OS_SPHERE: +0.50
OS_AXIS: 073
OD_SPHERE: +0.25
OD_AXIS: 073

## 2023-06-20 ASSESSMENT — LID POSITION - PTOSIS
OS_PTOSIS: ABSENT
OD_PTOSIS: ABSENT

## 2023-06-20 ASSESSMENT — SPHEQUIV_DERIVED
OD_SPHEQUIV: 0
OS_SPHEQUIV: 0

## 2023-06-20 ASSESSMENT — LID POSITION - COMMENTS
OD_COMMENTS: WOUND C/D/I  GOOD HEIGHT AND GOOD SYMMETRY
OS_COMMENTS: WOUND C/D/I  GOOD HEIGHT AND GOOD SYMMETRY

## 2023-06-20 ASSESSMENT — AXIALLENGTH_DERIVED
OS_AL: 23.5004
OD_AL: 23.4097

## 2023-06-20 ASSESSMENT — VISUAL ACUITY
OD_BCVA: 20/40
OS_BCVA: 20/150

## 2023-06-20 ASSESSMENT — KERATOMETRY
OS_AXISANGLE_DEGREES: 064
OS_K2POWER_DIOPTERS: 43.50
OS_K1POWER_DIOPTERS: 44.00
OD_K2POWER_DIOPTERS: 43.75
OD_K1POWER_DIOPTERS: 44.25
OD_AXISANGLE_DEGREES: 070

## 2023-06-20 ASSESSMENT — TONOMETRY
OS_IOP_MMHG: 16
OD_IOP_MMHG: 13

## 2023-07-12 ENCOUNTER — APPOINTMENT (OUTPATIENT)
Dept: ORTHOPEDIC SURGERY | Facility: CLINIC | Age: 68
End: 2023-07-12

## 2023-07-18 ENCOUNTER — RX RENEWAL (OUTPATIENT)
Age: 68
End: 2023-07-18

## 2023-07-21 ENCOUNTER — APPOINTMENT (OUTPATIENT)
Dept: CARDIOLOGY | Facility: CLINIC | Age: 68
End: 2023-07-21
Payer: MEDICARE

## 2023-07-21 ENCOUNTER — NON-APPOINTMENT (OUTPATIENT)
Age: 68
End: 2023-07-21

## 2023-07-21 VITALS
TEMPERATURE: 98.6 F | BODY MASS INDEX: 26.96 KG/M2 | HEART RATE: 82 BPM | SYSTOLIC BLOOD PRESSURE: 149 MMHG | HEIGHT: 69 IN | WEIGHT: 182 LBS | DIASTOLIC BLOOD PRESSURE: 79 MMHG | OXYGEN SATURATION: 98 %

## 2023-07-21 PROCEDURE — 99215 OFFICE O/P EST HI 40 MIN: CPT

## 2023-07-21 PROCEDURE — 93000 ELECTROCARDIOGRAM COMPLETE: CPT

## 2023-07-21 NOTE — DISCUSSION/SUMMARY
[EKG obtained to assist in diagnosis and management of assessed problem(s)] : EKG obtained to assist in diagnosis and management of assessed problem(s) [FreeTextEntry1] : 68F with HLD, CAD/PCI presents for f/u\par \par 1. CAD\par -s/p PCI to RCA 11/2020\par -pt feeling well, denies cp\par -cont asa lipitor 80 \par -will change lopressor 25 to toprol 50\par \par 2. carotid plaque \par -cont asa, statin\par -asymptomatic, will monitor\par \par 3. HTN\par -cont current regimen of bb, lisinopril and hctz\par -cont bp log\par -will monitor\par -pt recently started exercising at a gym, discussed w pt that weight loss can improve bp as well\par \par f/u 5 months unless requires sooner\par >40 min spent on complete encounter.\par all Q's answered. \par

## 2023-07-21 NOTE — HISTORY OF PRESENT ILLNESS
[FreeTextEntry1] : 68F with HLD, CAD/PCI presents for f/u\par PMD Dr Concetta Valdivia/Dr Lola Flores.\par pulm: Dr Tea Zhu\par \par previously, \par pt initially with [facility] 9/2020, echo done at that time revealed grossly normal LV systolic function, mild LVH, MAC and AV calcification with normal opening. Carotid studies revealed b/l 16-49% stenosis. pt was sent for a nuclear stress test which revealed medium sized moderate defects in the distal inferior, inferolateral walls that were reversible. and pt was sent for cardiac cath (11/2020) where she Recieved a 3mm x 24mm JAE to a 90% RCA lesion. TTE did reveal increased RV wall thickness, pt had cMRI, which showed no evidence of myocarditis, cardiomyopathy, or ARVD. She also had a pulm eval with a CT chest without evidence of parenchymal lung dz, no PE, and PFTs showing mild restrictive pattern and pt was placed on albuterol inhalers. \par \par 11/21, still with occasional cp, unchanged from her prev symptoms but she is "able to handle it" pt with significant stress at work, unable to tolerate her boss, stated she felt like she was "not allowed" to quit. bp cont to be elevated, on lopressor 25 bid, and lisinopril 20, and about to start hctz 12.5. \par 8/22, endorsing acute onset of LUE numbness (no LE or facial involvement, no motor deficits) pt advised to go to the ER to eval for TIA. CT head and MRI both showing no cerebral infarct or ischemia. \par 9/22, feeling well. no complaints. \par last seen 1/23, feeling well. TTE showing LVOT gradient up to 29 mm hg with valsalva\par \par pt now presents for follow up.\par today,\par \par pt feeling well. pt denies cp sob palpitations. no LE edema, syncope. persistent chronic back pain, shoulder pain. \par pt recently joined a gym, trying to go more. \par  \par bp 149/99 here today, hasn’t been checking at home. states full compliance with her meds, pt on lopressor, lisinopril-hctz\par \par \par EKG: SR, TWI (seen on prev)\par \par CAD\par s/p PCI on asa, stent was 11/2020. \par \par recent labs 8/14/2020: tot chol 205 tg 102 hdl 79 ldl 105 Cr 0.58 a1c 6.3\par \par pt received covid vaccine (pfizer)\par \par Med hx: HLD, "heart murmur", chronic back pain, thyroid nodules\par OBGYN hx: does not have children. Currently post menopause\par Sx hx: back sx, hand sx, cataract, hip replacement, removal of benign parotid gland tumor.\par Fam hx: M: breast CA, F: PPM\par Social hx: lives in [place], with twin sister, . quit tob 3/2020 (30 pk yr hx), drinks wine a couple times a week. denies drug use. \par Meds: lipitor 80 protonix bid, cimetidine, asa, lopressor 25 bid. lisinopril-HCTZ 20-12.5 glimiperide 1 mg bid, albuterol inhaler. trulicity\par Allergies: neosporin, bacitracin (rash). \par

## 2023-07-21 NOTE — PHYSICAL EXAM
[Well Developed] : well developed [Well Nourished] : well nourished [No Acute Distress] : no acute distress [Normal Venous Pressure] : normal venous pressure [Normal S1, S2] : normal S1, S2 [Clear Lung Fields] : clear lung fields [Good Air Entry] : good air entry [Soft] : abdomen soft [Non Tender] : non-tender [No Edema] : no edema [Moves all extremities] : moves all extremities [No Focal Deficits] : no focal deficits [Alert and Oriented] : alert and oriented [de-identified] : 3/6 blowing systolic ejection murmur LUSB

## 2023-08-17 ENCOUNTER — LABORATORY RESULT (OUTPATIENT)
Age: 68
End: 2023-08-17

## 2023-08-17 ENCOUNTER — APPOINTMENT (OUTPATIENT)
Dept: INTERNAL MEDICINE | Facility: CLINIC | Age: 68
End: 2023-08-17
Payer: MEDICARE

## 2023-08-17 ENCOUNTER — RESULT REVIEW (OUTPATIENT)
Age: 68
End: 2023-08-17

## 2023-08-17 VITALS
DIASTOLIC BLOOD PRESSURE: 73 MMHG | HEART RATE: 83 BPM | TEMPERATURE: 97.5 F | WEIGHT: 183 LBS | HEIGHT: 69 IN | SYSTOLIC BLOOD PRESSURE: 122 MMHG | OXYGEN SATURATION: 97 % | BODY MASS INDEX: 27.11 KG/M2

## 2023-08-17 DIAGNOSIS — Z00.00 ENCOUNTER FOR GENERAL ADULT MEDICAL EXAMINATION W/OUT ABNORMAL FINDINGS: ICD-10-CM

## 2023-08-17 DIAGNOSIS — R94.31 ABNORMAL ELECTROCARDIOGRAM [ECG] [EKG]: ICD-10-CM

## 2023-08-17 DIAGNOSIS — S22.31XA FRACTURE OF ONE RIB, RIGHT SIDE, INITIAL ENCOUNTER FOR CLOSED FRACTURE: ICD-10-CM

## 2023-08-17 DIAGNOSIS — E04.1 NONTOXIC SINGLE THYROID NODULE: ICD-10-CM

## 2023-08-17 PROCEDURE — 36415 COLL VENOUS BLD VENIPUNCTURE: CPT

## 2023-08-17 PROCEDURE — G0439: CPT

## 2023-08-17 PROCEDURE — 99214 OFFICE O/P EST MOD 30 MIN: CPT | Mod: 25

## 2023-08-17 RX ORDER — DICLOFENAC SODIUM 75 MG/1
75 TABLET, DELAYED RELEASE ORAL TWICE DAILY
Qty: 60 | Refills: 3 | Status: DISCONTINUED | COMMUNITY
Start: 2023-04-20 | End: 2023-08-17

## 2023-08-17 NOTE — PHYSICAL EXAM
[No Acute Distress] : no acute distress [Well Nourished] : well nourished [Well Developed] : well developed [Well-Appearing] : well-appearing [Normal Sclera/Conjunctiva] : normal sclera/conjunctiva [PERRL] : pupils equal round and reactive to light [EOMI] : extraocular movements intact [Normal Outer Ear/Nose] : the outer ears and nose were normal in appearance [Normal Oropharynx] : the oropharynx was normal [No JVD] : no jugular venous distention [No Lymphadenopathy] : no lymphadenopathy [Supple] : supple [Thyroid Normal, No Nodules] : the thyroid was normal and there were no nodules present [No Respiratory Distress] : no respiratory distress  [No Accessory Muscle Use] : no accessory muscle use [Clear to Auscultation] : lungs were clear to auscultation bilaterally [Normal Rate] : normal rate  [Regular Rhythm] : with a regular rhythm [Normal S1, S2] : normal S1 and S2 [No Murmur] : no murmur heard [No Edema] : there was no peripheral edema [Soft] : abdomen soft [Non Tender] : non-tender [Non-distended] : non-distended [Normal Bowel Sounds] : normal bowel sounds [Normal Posterior Cervical Nodes] : no posterior cervical lymphadenopathy [Normal Anterior Cervical Nodes] : no anterior cervical lymphadenopathy [No CVA Tenderness] : no CVA  tenderness [No Spinal Tenderness] : no spinal tenderness [No Joint Swelling] : no joint swelling [Grossly Normal Strength/Tone] : grossly normal strength/tone [No Rash] : no rash [Coordination Grossly Intact] : coordination grossly intact [No Focal Deficits] : no focal deficits [Normal Gait] : normal gait [Deep Tendon Reflexes (DTR)] : deep tendon reflexes were 2+ and symmetric [Speech Grossly Normal] : speech grossly normal [Memory Grossly Normal] : memory grossly normal [Normal Affect] : the affect was normal [Normal Mood] : the mood was normal [Normal Insight/Judgement] : insight and judgment were intact [Comprehensive Foot Exam Normal] : Right and left foot were examined and both feet are normal. No ulcers in either foot. Toes are normal and with full ROM.  Normal tactile sensation with monofilament testing throughout both feet [No Carotid Bruits] : no carotid bruits [Pedal Pulses Present] : the pedal pulses are present [Normal Appearance] : normal in appearance [No Masses] : no palpable masses [No Nipple Discharge] : no nipple discharge [No Axillary Lymphadenopathy] : no axillary lymphadenopathy [Declined Rectal Exam] : declined rectal exam [Normal Supraclavicular Nodes] : no supraclavicular lymphadenopathy [Normal Axillary Nodes] : no axillary lymphadenopathy [Normal Inguinal Nodes] : no inguinal lymphadenopathy

## 2023-08-17 NOTE — COUNSELING
[Fall prevention counseling provided] : Fall prevention counseling provided [None] : None [Good understanding] : Patient has a good understanding of lifestyle changes and steps needed to achieve self management goal [ - Annual Lung Cancer Screening/Share Decision Making Discussion] : Annual Lung Cancer Screening/Share Decision Making Discussion. (I have advised this patient to have a Low Dose CT (LDCT) scan of the lungs and have discussed the following with the patient in a shared decision making discussion:   Benefits of Detection and Early Treatment: There is adequate evidence that annual screening for lung cancer with LDCT in a population of high-risk persons can prevent a substantial number of lung cancer–related deaths. The magnitude of benefit depends on the individual patient's risk for lung cancer, as those who are at highest risk are most likely to benefit. Screening cannot prevent most lung cancer–related deaths, and does not replace smoking cessation. Harms of Detection and Early Intervention and Treatment: The harms associated with LDCT screening include false-negative and false-positive results, incidental findings, over diagnosis, and radiation exposure. False-positive LDCT results occur in a substantial proportion of screened persons; 95% of all positive results do not lead to a diagnosis of cancer. In a high-quality screening program, further imaging can resolve most false-positive results; however, some patients may require invasive procedures. Radiation harms, including cancer resulting from cumulative exposure to radiation, vary depending on the age at the start of screening; the number of scans received; and the person's exposure to other sources of radiation, particularly other medical imaging.)

## 2023-08-17 NOTE — HEALTH RISK ASSESSMENT
[No falls in past year] : Patient reported no falls in the past year [0] : 2) Feeling down, depressed, or hopeless: Not at all (0) [Patient reported mammogram was normal] : Patient reported mammogram was normal [Patient reported bone density results were abnormal] : Patient reported bone density results were abnormal [Patient reported colonoscopy was normal] : Patient reported colonoscopy was normal [HIV test declined] : HIV test declined [Hepatitis C test declined] : Hepatitis C test declined [Fully functional (bathing, dressing, toileting, transferring, walking, feeding)] : Fully functional (bathing, dressing, toileting, transferring, walking, feeding) [Fully functional (using the telephone, shopping, preparing meals, housekeeping, doing laundry, using] : Fully functional and needs no help or supervision to perform IADLs (using the telephone, shopping, preparing meals, housekeeping, doing laundry, using transportation, managing medications and managing finances) [Designated Healthcare Proxy] : Designated healthcare proxy [Name: ___] : Health Care Proxy's Name: [unfilled]  [Relationship: ___] : Relationship: [unfilled] [Aggressive treatment] : aggressive treatment [Former] : Former [20 or more] : 20 or more [< 15 Years] : < 15 Years [PHQ-2 Negative - No further assessment needed] : PHQ-2 Negative - No further assessment needed [I have developed a follow-up plan documented below in the note.] : I have developed a follow-up plan documented below in the note. [[id number]] : 0 [LowDoseCTScan] : 2/22 [MammogramDate] : 2023 [BoneDensityDate] : 10/2022 [BoneDensityComments] : osteopenia [ColonoscopyDate] : 5/28/2021 [ColonoscopyComments] : Repeat in 5 years [I will adhere to the patient's wishes.] : I will adhere to the patient's wishes. [Time Spent: ___ minutes] : Time Spent: [unfilled] minutes [AdvancecareDate] : 8/17/23

## 2023-08-17 NOTE — END OF VISIT
[1. Privacy issue, precluded by [facility] or Federal Law] : Reason - Privacy issue, precluded by [facility] or Federal Law

## 2023-08-17 NOTE — ASSESSMENT
[FreeTextEntry1] : 68 F w/ PMHx of chronic back pain, B/L hip THR, dyslipidemia, DM, former smoker, CAD with stenting, GERD and heart murmur here for AWV.  #1 HTN: Controlled on med. Recommendations include: Low sodium diet (2 grams/24 hours), limit caffeine to 1 cup per day, Maintain a healthy weight, exercise at least 30 minutes 3 x per week, Medication compliance and Annual EKG/2D Echo/cardiac evaluation. Check BW and urine testing. UTD with EKG.  #2 Bilateral hip arthritis: S/p hip replacements.  #3 Former Smoker: Lungs are clear. Ordered low dose CT. Lungs are clear. Renewed Albuterol and advair inhalers.  #4 Allergies: Renewed flonase.  #5 CAD with stent: UTD with cardio Dr. Lyles. On standard of care meds.  #6 Lower back pain with spasms/balance issues: Discussed good back health. The patient should avoid heavy lifting. They should sleep in the supine position and make sure to stretch their hamstrings. Proper stretching techniques were explained. They should use warm compresses, stretching, massage and take over the counter anti-inflammatories as needed. If they develop any lower extremity weakness, saddle anesthesia or fecal/urinary incontinence they should go to the ER immediately. Now exercising at [employer] and has .  #7 Dyslipidemia: Follow a low cholesterol diet, exercise and maintain a healthy weight. Continue statin. Check lipids and LFTs. #8 DM: Last A1C was 6.8 will recheck today. Taking Trulicity 1.5 mg. Sees optho and podiatry. Has contour test strips and lancets.  #9 Thyroid nodule: Sees Dr Bird and repeat Sono in fall.  #10 GERD: Sees Dr Dunaway.  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 She is not having headaches or seizures. Not on meds for this. Monitoring.  #12 HCM/CPE: AWV done today including BW and urine testing done today. Refused PB as she is UTD with colonoscopy and will see GYN. Age appropriate health care maintenance modalities were discussed at length including proper nutrition, routine exercise, maintain healthy weight, safe sexual practices, the use of seatbelts, the use of sunblock/proper clothing, the avoidance of binge drinking, the avoidance of drug use, fall precautions, medication compliance and side effects, the need for preventative screenings and the need for routine medical followup.All the patient's questions and concerns were answered at the time of the visit. The patient is agreeable to above treatment plan.Sees me every 3-4 months and PRN. Sister Lyubov is HCP and she is full code.

## 2023-08-17 NOTE — HEALTH RISK ASSESSMENT
[No falls in past year] : Patient reported no falls in the past year [0] : 2) Feeling down, depressed, or hopeless: Not at all (0) [Patient reported mammogram was normal] : Patient reported mammogram was normal [Patient reported bone density results were abnormal] : Patient reported bone density results were abnormal [Patient reported colonoscopy was normal] : Patient reported colonoscopy was normal [HIV test declined] : HIV test declined [Hepatitis C test declined] : Hepatitis C test declined [Fully functional (bathing, dressing, toileting, transferring, walking, feeding)] : Fully functional (bathing, dressing, toileting, transferring, walking, feeding) [Fully functional (using the telephone, shopping, preparing meals, housekeeping, doing laundry, using] : Fully functional and needs no help or supervision to perform IADLs (using the telephone, shopping, preparing meals, housekeeping, doing laundry, using transportation, managing medications and managing finances) [Designated Healthcare Proxy] : Designated healthcare proxy [Name: ___] : Health Care Proxy's Name: [unfilled]  [Relationship: ___] : Relationship: [unfilled] [Aggressive treatment] : aggressive treatment [Former] : Former [20 or more] : 20 or more [< 15 Years] : < 15 Years [PHQ-2 Negative - No further assessment needed] : PHQ-2 Negative - No further assessment needed [I have developed a follow-up plan documented below in the note.] : I have developed a follow-up plan documented below in the note. [[id number]] : 0 [LowDoseCTScan] : 2/22 [MammogramDate] : 2023 [BoneDensityDate] : 10/2022 [BoneDensityComments] : osteopenia [ColonoscopyDate] : 5/28/2021 [ColonoscopyComments] : Repeat in 5 years [I will adhere to the patient's wishes.] : I will adhere to the patient's wishes. [Time Spent: ___ minutes] : Time Spent: [unfilled] minutes [AdvancecareDate] : 8/17/23

## 2023-08-17 NOTE — REVIEW OF SYSTEMS
[Recent Change In Weight] : ~T recent weight change [Vision Problems] : vision problems [Back Pain] : back pain [Negative] : Heme/Lymph [FreeTextEntry2] : -2lbs [FreeTextEntry5] : CAD [FreeTextEntry9] : hip replacements [FreeTextEntry1] : HTN, DM, hyperlipidemia, osteopenia, thyroid nodule

## 2023-08-17 NOTE — HISTORY OF PRESENT ILLNESS
[de-identified] : 68 F w/ PMHx of chronic back pain, B/L hip THR, dyslipidemia, DM, former smoker, CAD with stenting, GERD and heart murmur here for AWV.  She is going to the gym at [employer] in [place]. She has pain in her L shoulder for the last year. She has had frozen shoulder in the past. Wants to see ortho at [facility] and [facility].  She has a history of chronic back pain.  She has had surgeries in the past. She has occasional right-sided lumbar radiculopathy with chronic numbness of the right foot. Follows up with the orthopedist and pain management as needed. Not taking any medication for this on a daily basis. She is not having any associated lower extremity weakness, fecal or urinary incontinence, or any saddle anesthesia. HTN, CAD and DM controlled on meds. Has stents and is seeing Dr Montiel. Been taking the lower dose of trulicity. Sees Dr Dunaway for GERD. Controlled of meds. Pt has a thyroid nodule sees Dr Bird. Repeat sono in 3 years--Sept 2024 (but she wants to do sooner) She was an active smoker until March 2020 when she was able to quit. She is not having any wheezing, shortness of breath, or difficulty breathing. Due for CT lung cancer screen.  The patient denies any current fevers, chills, fatigue, headaches, dizziness, blurry vision, chest pain, palpitations, shortness of breath, dyspnea on exertion, abdominal pain, urinary symptoms or any nausea/vomiting/diarrhea/constipation. UTD with colonoscopy. Due for GYN. Wants sister Lyubov Anand to be HCP and to be full code.

## 2023-08-17 NOTE — HISTORY OF PRESENT ILLNESS
[de-identified] : 68 F w/ PMHx of chronic back pain, B/L hip THR, dyslipidemia, DM, former smoker, CAD with stenting, GERD and heart murmur here for AWV.  She is going to the gym at [employer] in [place]. She has pain in her L shoulder for the last year. She has had frozen shoulder in the past. Wants to see ortho at [facility] and [facility].  She has a history of chronic back pain.  She has had surgeries in the past. She has occasional right-sided lumbar radiculopathy with chronic numbness of the right foot. Follows up with the orthopedist and pain management as needed. Not taking any medication for this on a daily basis. She is not having any associated lower extremity weakness, fecal or urinary incontinence, or any saddle anesthesia. HTN, CAD and DM controlled on meds. Has stents and is seeing Dr Montiel. Been taking the lower dose of trulicity. Sees Dr Dunaway for GERD. Controlled of meds. Pt has a thyroid nodule sees Dr Bird. Repeat sono in 3 years--Sept 2024 (but she wants to do sooner) She was an active smoker until March 2020 when she was able to quit. She is not having any wheezing, shortness of breath, or difficulty breathing. Due for CT lung cancer screen.  The patient denies any current fevers, chills, fatigue, headaches, dizziness, blurry vision, chest pain, palpitations, shortness of breath, dyspnea on exertion, abdominal pain, urinary symptoms or any nausea/vomiting/diarrhea/constipation. UTD with colonoscopy. Due for GYN. Wants sister Lyubov Anand to be HCP and to be full code.

## 2023-08-17 NOTE — END OF VISIT
[1. Privacy issue, precluded by [facility] or Federal Law] : Reason - Privacy issue, precluded by [facility] or Federal Law

## 2023-08-20 ENCOUNTER — TRANSCRIPTION ENCOUNTER (OUTPATIENT)
Age: 68
End: 2023-08-20

## 2023-08-20 LAB
25(OH)D3 SERPL-MCNC: 32.4 NG/ML
ALBUMIN SERPL ELPH-MCNC: 4.5 G/DL
ALP BLD-CCNC: 191 U/L
ALT SERPL-CCNC: 85 U/L
ANION GAP SERPL CALC-SCNC: 14 MMOL/L
APPEARANCE: CLEAR
AST SERPL-CCNC: 42 U/L
BILIRUB SERPL-MCNC: 0.4 MG/DL
BILIRUBIN URINE: NEGATIVE
BLOOD URINE: NEGATIVE
BUN SERPL-MCNC: 15 MG/DL
CALCIUM SERPL-MCNC: 9.6 MG/DL
CHLORIDE SERPL-SCNC: 104 MMOL/L
CHOLEST SERPL-MCNC: 176 MG/DL
CO2 SERPL-SCNC: 21 MMOL/L
COLOR: YELLOW
CREAT SERPL-MCNC: 0.65 MG/DL
CREAT SPEC-SCNC: 96 MG/DL
EGFR: 96 ML/MIN/1.73M2
ESTIMATED AVERAGE GLUCOSE: 128 MG/DL
GLUCOSE QUALITATIVE U: NEGATIVE MG/DL
GLUCOSE SERPL-MCNC: 127 MG/DL
HBA1C MFR BLD HPLC: 6.1 %
HDLC SERPL-MCNC: 61 MG/DL
KETONES URINE: NEGATIVE MG/DL
LDLC SERPL CALC-MCNC: 99 MG/DL
LEUKOCYTE ESTERASE URINE: ABNORMAL
MICROALBUMIN 24H UR DL<=1MG/L-MCNC: <1.2 MG/DL
MICROALBUMIN/CREAT 24H UR-RTO: NORMAL MG/G
NITRITE URINE: NEGATIVE
NONHDLC SERPL-MCNC: 115 MG/DL
PH URINE: 6
POTASSIUM SERPL-SCNC: 4.5 MMOL/L
PROT SERPL-MCNC: 7.2 G/DL
PROTEIN URINE: NEGATIVE MG/DL
SODIUM SERPL-SCNC: 140 MMOL/L
SPECIFIC GRAVITY URINE: 1.02
TRIGL SERPL-MCNC: 88 MG/DL
TSH SERPL-ACNC: 1.87 UIU/ML
UROBILINOGEN URINE: 0.2 MG/DL

## 2023-08-29 ENCOUNTER — NON-APPOINTMENT (OUTPATIENT)
Age: 68
End: 2023-08-29

## 2023-08-30 VITALS — HEIGHT: 69 IN | BODY MASS INDEX: 27.11 KG/M2 | WEIGHT: 183 LBS

## 2023-08-30 NOTE — HISTORY OF PRESENT ILLNESS
[Former] : Former [TextBox_13] : Referred by Dr. Tanesha Kramer.  Ms. DICKENS is a 68 year old female with a history of CAD and nicotine dependence.  Reviewed and confirmed that the patient meets screening eligibility criteria:  68 years old   Smoking Status: Former smoker   Number of pack(s) per day: 0.5 Number of years smoked: 40 Number of pack years smokin Quit year:   No symptoms of lung cancer, including new cough, change in cough, hemoptysis, and unintentional weight loss.  No personal history of lung cancer.  No lung cancer in a first degree relative.  No history of lung disease or occupational exposures. [YearQuit] : 2022 [PacksperYear] : 20

## 2023-08-31 ENCOUNTER — RX RENEWAL (OUTPATIENT)
Age: 68
End: 2023-08-31

## 2023-09-05 ENCOUNTER — APPOINTMENT (OUTPATIENT)
Dept: CT IMAGING | Facility: CLINIC | Age: 68
End: 2023-09-05
Payer: MEDICARE

## 2023-09-05 PROCEDURE — 71271 CT THORAX LUNG CANCER SCR C-: CPT

## 2023-09-11 ENCOUNTER — RX RENEWAL (OUTPATIENT)
Age: 68
End: 2023-09-11

## 2023-09-13 ENCOUNTER — APPOINTMENT (OUTPATIENT)
Dept: ORTHOPEDIC SURGERY | Facility: CLINIC | Age: 68
End: 2023-09-13
Payer: MEDICARE

## 2023-09-13 VITALS — WEIGHT: 183 LBS | HEIGHT: 69 IN | BODY MASS INDEX: 27.11 KG/M2

## 2023-09-13 DIAGNOSIS — M19.019 PRIMARY OSTEOARTHRITIS, UNSPECIFIED SHOULDER: ICD-10-CM

## 2023-09-13 PROCEDURE — 20610 DRAIN/INJ JOINT/BURSA W/O US: CPT | Mod: LT

## 2023-09-13 PROCEDURE — 73030 X-RAY EXAM OF SHOULDER: CPT | Mod: LT

## 2023-09-13 PROCEDURE — 99214 OFFICE O/P EST MOD 30 MIN: CPT | Mod: 25

## 2023-09-13 PROCEDURE — J3490M: CUSTOM

## 2023-09-14 ENCOUNTER — TRANSCRIPTION ENCOUNTER (OUTPATIENT)
Age: 68
End: 2023-09-14

## 2023-10-05 ENCOUNTER — APPOINTMENT (OUTPATIENT)
Dept: ORTHOPEDIC SURGERY | Facility: CLINIC | Age: 68
End: 2023-10-05

## 2023-10-24 ENCOUNTER — RX RENEWAL (OUTPATIENT)
Age: 68
End: 2023-10-24

## 2023-11-16 ENCOUNTER — RESULT REVIEW (OUTPATIENT)
Age: 68
End: 2023-11-16

## 2023-11-16 ENCOUNTER — APPOINTMENT (OUTPATIENT)
Dept: MAMMOGRAPHY | Facility: CLINIC | Age: 68
End: 2023-11-16
Payer: MEDICARE

## 2023-11-16 ENCOUNTER — APPOINTMENT (OUTPATIENT)
Dept: ULTRASOUND IMAGING | Facility: CLINIC | Age: 68
End: 2023-11-16
Payer: MEDICARE

## 2023-11-16 ENCOUNTER — TRANSCRIPTION ENCOUNTER (OUTPATIENT)
Age: 68
End: 2023-11-16

## 2023-11-16 PROCEDURE — 76536 US EXAM OF HEAD AND NECK: CPT

## 2023-11-16 PROCEDURE — G0279: CPT

## 2023-11-16 PROCEDURE — 93880 EXTRACRANIAL BILAT STUDY: CPT

## 2023-11-16 PROCEDURE — 76641 ULTRASOUND BREAST COMPLETE: CPT | Mod: 50

## 2023-11-16 PROCEDURE — 77066 DX MAMMO INCL CAD BI: CPT

## 2023-11-17 ENCOUNTER — TRANSCRIPTION ENCOUNTER (OUTPATIENT)
Age: 68
End: 2023-11-17

## 2023-11-18 ENCOUNTER — NON-APPOINTMENT (OUTPATIENT)
Age: 68
End: 2023-11-18

## 2023-11-19 ENCOUNTER — TRANSCRIPTION ENCOUNTER (OUTPATIENT)
Age: 68
End: 2023-11-19

## 2023-11-29 ENCOUNTER — RX RENEWAL (OUTPATIENT)
Age: 68
End: 2023-11-29

## 2023-12-09 ENCOUNTER — RX RENEWAL (OUTPATIENT)
Age: 68
End: 2023-12-09

## 2023-12-11 ENCOUNTER — RX RENEWAL (OUTPATIENT)
Age: 68
End: 2023-12-11

## 2023-12-31 PROBLEM — Z23 NEED FOR VACCINATION WITH 13-POLYVALENT PNEUMOCOCCAL CONJUGATE VACCINE: Status: RESOLVED | Noted: 2021-07-01 | Resolved: 2021-09-02

## 2023-12-31 PROBLEM — H02.33 EXCESS SKIN OF BOTH EYELIDS: Status: RESOLVED | Noted: 2022-04-29 | Resolved: 2022-08-05

## 2023-12-31 PROBLEM — Z23 NEED FOR 23-POLYVALENT PNEUMOCOCCAL POLYSACCHARIDE VACCINE: Status: RESOLVED | Noted: 2022-08-05 | Resolved: 2022-12-05

## 2024-01-03 ENCOUNTER — APPOINTMENT (OUTPATIENT)
Dept: MRI IMAGING | Facility: CLINIC | Age: 69
End: 2024-01-03
Payer: MEDICARE

## 2024-01-03 ENCOUNTER — APPOINTMENT (OUTPATIENT)
Dept: ORTHOPEDIC SURGERY | Facility: CLINIC | Age: 69
End: 2024-01-03
Payer: MEDICARE

## 2024-01-03 ENCOUNTER — RESULT REVIEW (OUTPATIENT)
Age: 69
End: 2024-01-03

## 2024-01-03 VITALS — WEIGHT: 183 LBS | BODY MASS INDEX: 27.11 KG/M2 | HEIGHT: 69 IN

## 2024-01-03 DIAGNOSIS — M75.42 IMPINGEMENT SYNDROME OF LEFT SHOULDER: ICD-10-CM

## 2024-01-03 DIAGNOSIS — M75.52 BURSITIS OF LEFT SHOULDER: ICD-10-CM

## 2024-01-03 DIAGNOSIS — M19.012 PRIMARY OSTEOARTHRITIS, LEFT SHOULDER: ICD-10-CM

## 2024-01-03 PROCEDURE — 99213 OFFICE O/P EST LOW 20 MIN: CPT | Mod: 25

## 2024-01-03 PROCEDURE — 20610 DRAIN/INJ JOINT/BURSA W/O US: CPT | Mod: LT

## 2024-01-03 PROCEDURE — A9585: CPT

## 2024-01-03 PROCEDURE — J3490M: CUSTOM | Mod: NC

## 2024-01-03 PROCEDURE — 77049 MRI BREAST C-+ W/CAD BI: CPT | Mod: MH

## 2024-01-03 NOTE — ASSESSMENT
[FreeTextEntry1] : will repeat csi discussed the role of ha injections of possible tsa will f/u with DR Carballo

## 2024-01-03 NOTE — IMAGING
[FreeTextEntry1] : gh narrowing with inferior humeral head osteophyte, latge osteophyte of inferior acromion, calcific deposits in rc distribution

## 2024-01-03 NOTE — PROCEDURE
[Large Joint Injection] : Large joint injection [Left] : of the left [Subacromial Space] : subacromial space [Pain] : pain [Inflammation] : inflammation [Alcohol] : alcohol [Betadine] : betadine [Ethyl Chloride sprayed topically] : ethyl chloride sprayed topically [Sterile technique used] : sterile technique used [___ cc    6mg] :  Betamethasone (Celestone) ~Vcc of 6mg [___ cc    0.5%] : Bupivacaine (Marcaine) ~Vcc of 0.5%  [] : Patient tolerated procedure well [Call if redness, pain or fever occur] : call if redness, pain or fever occur [Apply ice for 15min out of every hour for the next 12-24 hours as tolerated] : apply ice for 15 minutes out of every hour for the next 12-24 hours as tolerated [Patient was advised to rest the joint(s) for ____ days] : patient was advised to rest the joint(s) for [unfilled] days [Previous OTC use and PT nontherapeutic] : patient has tried OTC's including aspirin, Ibuprofen, Aleve, etc or prescription NSAIDS, and/or exercises at home and/or physical therapy without satisfactory response [Patient had decreased mobility in the joint] : patient had decreased mobility in the joint [Risks, benefits, alternatives discussed / Verbal consent obtained] : the risks benefits, and alternatives have been discussed, and verbal consent was obtained

## 2024-01-03 NOTE — HISTORY OF PRESENT ILLNESS
[Dull/Aching] : dull/aching [Localized] : localized [Constant] : constant [Retired] : Work status: retired [de-identified] :  01/03/2024: Known left shoulder oa did well with csi in september pain has returned would like repeat csi   09/13/23 - Right hand dominant 67 yo f here for eval of lt shoulder for a year but getting worse recently over the last few weeks pain wakes her at night and with activities of rotation such as dressing. having trouble with her exercises at the gym  [7] : 7 [4] : 4 [] : Post Surgical Visit: no [FreeTextEntry1] : lt shoulder [FreeTextEntry5] : Patients states her left shoulder pain flared up in the last 2 weeks, no new injury, she was treated for her shoulder before at our urgent care, was given CSI. [de-identified] : movement

## 2024-01-03 NOTE — PHYSICAL EXAM
[Left] : left shoulder [4 ___] : forward flexion 4[unfilled]/5 [4___] : internal rotation 4[unfilled]/5 [] : positive impingement testing [FreeTextEntry9] : limited on ir/er due to pain [TWNoteComboBox7] : active forward flexion 125 degrees [TWNoteComboBox4] : passive forward flexion 170 degrees

## 2024-01-04 ENCOUNTER — LABORATORY RESULT (OUTPATIENT)
Age: 69
End: 2024-01-04

## 2024-01-04 ENCOUNTER — APPOINTMENT (OUTPATIENT)
Dept: INTERNAL MEDICINE | Facility: CLINIC | Age: 69
End: 2024-01-04
Payer: MEDICARE

## 2024-01-04 VITALS
OXYGEN SATURATION: 97 % | SYSTOLIC BLOOD PRESSURE: 180 MMHG | WEIGHT: 185 LBS | BODY MASS INDEX: 28.36 KG/M2 | TEMPERATURE: 97.6 F | HEART RATE: 83 BPM | DIASTOLIC BLOOD PRESSURE: 80 MMHG | HEIGHT: 67.72 IN

## 2024-01-04 DIAGNOSIS — Z92.89 PERSONAL HISTORY OF OTHER MEDICAL TREATMENT: ICD-10-CM

## 2024-01-04 DIAGNOSIS — T78.40XA ALLERGY, UNSPECIFIED, INITIAL ENCOUNTER: ICD-10-CM

## 2024-01-04 DIAGNOSIS — D32.9 BENIGN NEOPLASM OF MENINGES, UNSPECIFIED: ICD-10-CM

## 2024-01-04 DIAGNOSIS — R11.0 NAUSEA: ICD-10-CM

## 2024-01-04 DIAGNOSIS — M75.32 CALCIFIC TENDINITIS OF LEFT SHOULDER: ICD-10-CM

## 2024-01-04 DIAGNOSIS — R01.1 CARDIAC MURMUR, UNSPECIFIED: ICD-10-CM

## 2024-01-04 PROCEDURE — 99214 OFFICE O/P EST MOD 30 MIN: CPT

## 2024-01-04 PROCEDURE — 36415 COLL VENOUS BLD VENIPUNCTURE: CPT

## 2024-01-04 PROCEDURE — G2211 COMPLEX E/M VISIT ADD ON: CPT

## 2024-01-04 RX ORDER — FLUTICASONE PROPIONATE 50 UG/1
50 SPRAY, METERED NASAL
Qty: 16 | Refills: 3 | Status: ACTIVE | COMMUNITY
Start: 2021-10-21 | End: 1900-01-01

## 2024-01-04 RX ORDER — ASPIRIN 81 MG
81 TABLET, DELAYED RELEASE (ENTERIC COATED) ORAL DAILY
Qty: 90 | Refills: 3 | Status: ACTIVE | COMMUNITY
Start: 2019-10-09 | End: 1900-01-01

## 2024-01-04 RX ORDER — DULAGLUTIDE 3 MG/.5ML
3 INJECTION, SOLUTION SUBCUTANEOUS
Qty: 1 | Refills: 2 | Status: DISCONTINUED | COMMUNITY
Start: 2022-11-01 | End: 2024-01-04

## 2024-01-07 ENCOUNTER — TRANSCRIPTION ENCOUNTER (OUTPATIENT)
Age: 69
End: 2024-01-07

## 2024-01-07 LAB
25(OH)D3 SERPL-MCNC: 43.6 NG/ML
ALBUMIN SERPL ELPH-MCNC: 4.8 G/DL
ALP BLD-CCNC: 180 U/L
ALT SERPL-CCNC: 100 U/L
ANION GAP SERPL CALC-SCNC: 16 MMOL/L
APPEARANCE: CLEAR
AST SERPL-CCNC: 45 U/L
BASOPHILS # BLD AUTO: 0.02 K/UL
BASOPHILS NFR BLD AUTO: 0.2 %
BILIRUB SERPL-MCNC: 0.4 MG/DL
BILIRUBIN URINE: NEGATIVE
BLOOD URINE: NEGATIVE
BUN SERPL-MCNC: 25 MG/DL
CALCIUM SERPL-MCNC: 10.2 MG/DL
CHLORIDE SERPL-SCNC: 98 MMOL/L
CHOLEST SERPL-MCNC: 250 MG/DL
CO2 SERPL-SCNC: 20 MMOL/L
COLOR: YELLOW
CREAT SERPL-MCNC: 0.66 MG/DL
EGFR: 96 ML/MIN/1.73M2
EOSINOPHIL # BLD AUTO: 0 K/UL
EOSINOPHIL NFR BLD AUTO: 0 %
ESTIMATED AVERAGE GLUCOSE: 143 MG/DL
GLUCOSE QUALITATIVE U: 250 MG/DL
GLUCOSE SERPL-MCNC: 316 MG/DL
HBA1C MFR BLD HPLC: 6.6 %
HCT VFR BLD CALC: 42.3 %
HDLC SERPL-MCNC: 89 MG/DL
HGB BLD-MCNC: 14.2 G/DL
IMM GRANULOCYTES NFR BLD AUTO: 0.5 %
KETONES URINE: NEGATIVE MG/DL
LDLC SERPL CALC-MCNC: 149 MG/DL
LEUKOCYTE ESTERASE URINE: ABNORMAL
LYMPHOCYTES # BLD AUTO: 0.97 K/UL
LYMPHOCYTES NFR BLD AUTO: 9 %
MAN DIFF?: NORMAL
MCHC RBC-ENTMCNC: 30.7 PG
MCHC RBC-ENTMCNC: 33.6 GM/DL
MCV RBC AUTO: 91.4 FL
MONOCYTES # BLD AUTO: 0.17 K/UL
MONOCYTES NFR BLD AUTO: 1.6 %
NEUTROPHILS # BLD AUTO: 9.55 K/UL
NEUTROPHILS NFR BLD AUTO: 88.7 %
NITRITE URINE: NEGATIVE
NONHDLC SERPL-MCNC: 161 MG/DL
PH URINE: 6
PLATELET # BLD AUTO: 366 K/UL
POTASSIUM SERPL-SCNC: 5.4 MMOL/L
PROT SERPL-MCNC: 7.3 G/DL
PROTEIN URINE: NEGATIVE MG/DL
RBC # BLD: 4.63 M/UL
RBC # FLD: 11.5 %
SODIUM SERPL-SCNC: 135 MMOL/L
SPECIFIC GRAVITY URINE: 1.02
TRIGL SERPL-MCNC: 75 MG/DL
UROBILINOGEN URINE: 0.2 MG/DL
WBC # FLD AUTO: 10.76 K/UL

## 2024-01-11 ENCOUNTER — APPOINTMENT (OUTPATIENT)
Dept: INTERNAL MEDICINE | Facility: CLINIC | Age: 69
End: 2024-01-11
Payer: MEDICARE

## 2024-01-11 DIAGNOSIS — R09.82 POSTNASAL DRIP: ICD-10-CM

## 2024-01-11 PROCEDURE — 99214 OFFICE O/P EST MOD 30 MIN: CPT

## 2024-01-11 NOTE — HEALTH RISK ASSESSMENT
[No falls in past year] : Patient reported no falls in the past year [0] : 2) Feeling down, depressed, or hopeless: Not at all (0) [PHQ-2 Negative - No further assessment needed] : PHQ-2 Negative - No further assessment needed [I have developed a follow-up plan documented below in the note.] : I have developed a follow-up plan documented below in the note. [Patient reported mammogram was normal] : Patient reported mammogram was normal [HIV test declined] : HIV test declined [Hepatitis C test declined] : Hepatitis C test declined [Fully functional (bathing, dressing, toileting, transferring, walking, feeding)] : Fully functional (bathing, dressing, toileting, transferring, walking, feeding) [Fully functional (using the telephone, shopping, preparing meals, housekeeping, doing laundry, using] : Fully functional and needs no help or supervision to perform IADLs (using the telephone, shopping, preparing meals, housekeeping, doing laundry, using transportation, managing medications and managing finances) [[id number]] : 0 [MammogramDate] : 06/16/2019 [BoneDensityDate] : 05/04/2018 [ColonoscopyDate] : 10/23/2018

## 2024-01-11 NOTE — HISTORY OF PRESENT ILLNESS
[Home] : at home, [unfilled] , at the time of the visit. [Medical Office: ([facility])___] : at the medical office located in  [Verbal consent obtained from patient] : the patient, [unfilled] [FreeTextEntry1] : 68 F w/ PMHx of chronic back pain, B/L hip THR, dyslipidemia, preDM, former smoker, CAD with stenting, GERD/nausea and heart murmur having TEB for URI vs allergy symptoms.   She has been sick for the last few days but then last night had severe bodyaches and spike a 101 fever. She is having stuffiness with headache and dry cough. She tested positive at home covid test. Not having wheezing, CP or SOB. She has her inhalers to use as needed. Taking tylenol for the fever.  HTN controlled on meds.  Depression is unchanged. Refuses to go on meds and is seeing Dr. Cardenas.  She was dx with Glaucoma: Her eyesight has not gotten worse after her injections in both eyes, She was told after her next injection she will not need this again. UTD Optho.  She was an active smoker until March 2020 when she was able to quit. She is not having any wheezing, shortness of breath, or difficulty breathing. UTD CT lung cancer screen.   Has CAD with stenting and is on standard of care meds including plavix. No bleeding issues on this medication. Sees cardio Faigen.  She has a history of chronic back pain.  She has had surgeries in the past.  She has occasional right-sided lumbar radiculopathy with chronic numbness of the right foot. Follows up with the orthopedist and pain management as needed. Not taking any medication for this on a daily basis. She is not having any associated lower extremity weakness, fecal or urinary incontinence, or any saddle anesthesia. UTD with Dr Coco Lee but can't go back until she changes her insurance.  High cholesterol controlled on atorvastatin.  The patient denies any current dizziness, blurry vision, chest pain, palpitations, shortness of breath, dyspnea on exertion, abdominal pain, urinary symptoms or any nausea/vomiting/diarrhea/constipation. UTD mammo/sono and due for dexa. UTD with colonoscopy. Wants sister Lyubov Anand to be HCP and to be full code.

## 2024-01-11 NOTE — REVIEW OF SYSTEMS
[Fever] : fever [Feeling Poorly] : feeling poorly [Eyesight Problems] : eyesight problems [Nasal Discharge] : nasal discharge [Lower Ext Edema] : lower extremity edema [Cough] : cough [Joint Swelling] : joint swelling [see HPI] : see HPI [Negative] : Heme/Lymph [Joint Pain] : no joint pain [FreeTextEntry1] : covid 19

## 2024-01-11 NOTE — ASSESSMENT
[FreeTextEntry1] : 68 F w/ PMHx of chronic back pain, B/L hip THR, dyslipidemia, preDM, former smoker, CAD with stenting, GERD/nausea and heart murmur having TEB for URI vs allergy symptoms.   #1 HTN: Controlled on med. Recommendations include: Low sodium diet (2 grams/24 hours), limit caffeine to 1 cup per day, Maintain a healthy weight, exercise at least 30 minutes 3 x per week, Medication compliance and Annual EKG/2D Echo/cardiac evaluation. Check BW and urine testing. UTD with EKG. #2 Bilateral hip arthritis: S/p hip replacements. #3 Former Smoker: Lungs are clear. UTD low dose CT. Lungs are clear. Has Albuterol and advair inhalers. #4 Allergies: Renewed flonase. #5 CAD with stent: UTD with cardio Dr. Lyles. On standard of care meds. Renewed Ecotrin. #6 Lower back pain with spasms/balance issues: Discussed good back health. The patient should avoid heavy lifting. They should sleep in the supine position and make sure to stretch their hamstrings. Proper stretching techniques were explained. They should use warm compresses, stretching, massage and take over the counter anti-inflammatories as needed. If they develop any lower extremity weakness, saddle anesthesia or fecal/urinary incontinence they should go to the ER immediately. Now exercising at [employer] gym and has . #7 Dyslipidemia: Follow a low cholesterol diet, exercise and maintain a healthy weight. Continue statin. Check lipids and LFTs. #8 DM: Last A1C was 6.1 will recheck today. Taking Trulicity 1.5 mg but wants to go off of this due to price--she still has 2 months left. Sees optho and podiatry. Has contour test strips and lancets. #9 Thyroid nodule: Sees Dr Bird and Due for repeat sono in 2024. #10 GERD/chronic nausea: F/u with GI. 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 She is not having headaches or seizures. Not on meds for this. Monitoring with neuro. #12 Fecal urgency: UROGYN referral given. #13 Glaucoma: F/u Optho referral given: #14 HCM: BW, UA, and A1C done today. Refused PB as she is UTD with colonoscopy and will see GYN. All the patient's questions and concerns were answered at the time of the visit. The patient is agreeable to above treatment plan. Sees me every 3-4 months and PRN. Sister Lyubov is HCP and she is full code.

## 2024-01-23 ENCOUNTER — RX RENEWAL (OUTPATIENT)
Age: 69
End: 2024-01-23

## 2024-01-25 ENCOUNTER — RX RENEWAL (OUTPATIENT)
Age: 69
End: 2024-01-25

## 2024-02-21 ENCOUNTER — APPOINTMENT (OUTPATIENT)
Dept: INTERNAL MEDICINE | Facility: CLINIC | Age: 69
End: 2024-02-21
Payer: MEDICARE

## 2024-02-21 ENCOUNTER — APPOINTMENT (OUTPATIENT)
Dept: INTERNAL MEDICINE | Facility: CLINIC | Age: 69
End: 2024-02-21

## 2024-02-21 PROCEDURE — 99203 OFFICE O/P NEW LOW 30 MIN: CPT

## 2024-02-21 NOTE — PLAN
[FreeTextEntry1] : #Nasal discharge with watery eyes - Recommend mucinex BID, nasal saline, flonase and Tylenol PRN - Recommend follow up within 24-48 hours if no improvement noted.

## 2024-02-21 NOTE — PHYSICAL EXAM
[No Respiratory Distress] : no respiratory distress  [No Accessory Muscle Use] : no accessory muscle use [Normal] : affect was normal and insight and judgment were intact [de-identified] : Eyes appear watery and slightly red

## 2024-02-21 NOTE — HISTORY OF PRESENT ILLNESS
[Home] : at home, [unfilled] , at the time of the visit. [Medical Office: ([facility])___] : at the medical office located in  [Verbal consent obtained from patient] : the patient, [unfilled] [FreeTextEntry8] : 68 F with HTN, CAD s/p PCI, T2DM, HLD is presenting with a headache, nasal pain and watery eyes. Symptoms started a few days ago. She denies fevers, chills, cough, CP or SOB. She started using flonase nasal spray this AM as well as Tylenol.

## 2024-02-21 NOTE — REVIEW OF SYSTEMS
[Earache] : no earache [Hearing Loss] : no hearing loss [Nosebleed] : no nosebleeds [Nasal Discharge] : nasal discharge [Sore Throat] : no sore throat [Postnasal Drip] : no postnasal drip [Negative] : Psychiatric [FreeTextEntry3] : Excessive tearing

## 2024-02-22 ENCOUNTER — APPOINTMENT (OUTPATIENT)
Dept: INTERNAL MEDICINE | Facility: CLINIC | Age: 69
End: 2024-02-22
Payer: MEDICARE

## 2024-02-22 PROCEDURE — 99214 OFFICE O/P EST MOD 30 MIN: CPT

## 2024-02-22 NOTE — PLAN
[FreeTextEntry1] : #Facial pain - Doxycycline 100 mg PO BID x7 days has been prescribed. Advised to take with full glass of water. - Advised follow up if symptoms do not resolve within the next 48-72 hours.

## 2024-02-22 NOTE — HISTORY OF PRESENT ILLNESS
[Medical Office: ([facility])___] : at the medical office located in  [Home] : at home, [unfilled] , at the time of the visit. [Verbal consent obtained from patient] : the patient, [unfilled] [FreeTextEntry8] : 68 F with HTN, CAD s/p PCI, T2DM, HLD is presenting with severe right sided facial pain and increased nasal congestion. Symptoms started a few days ago. She denies fevers, chills, cough, CP or SOB. She has been using muxinex, flonase and tylenol without relief.

## 2024-02-22 NOTE — PHYSICAL EXAM
[Normal Sclera/Conjunctiva] : normal sclera/conjunctiva [Normal] : no acute distress, well nourished, well developed and well-appearing [No Respiratory Distress] : no respiratory distress  [No Accessory Muscle Use] : no accessory muscle use [de-identified] : Right sided TTP in the frontal and maxillary sinus region

## 2024-02-22 NOTE — REVIEW OF SYSTEMS
[Nasal Discharge] : nasal discharge [Postnasal Drip] : postnasal drip [FreeTextEntry4] : Facial pain [Negative] : Musculoskeletal

## 2024-02-23 ENCOUNTER — NON-APPOINTMENT (OUTPATIENT)
Age: 69
End: 2024-02-23

## 2024-02-23 DIAGNOSIS — R09.89 OTHER SPECIFIED SYMPTOMS AND SIGNS INVOLVING THE CIRCULATORY AND RESPIRATORY SYSTEMS: ICD-10-CM

## 2024-02-25 ENCOUNTER — RX RENEWAL (OUTPATIENT)
Age: 69
End: 2024-02-25

## 2024-02-29 ENCOUNTER — NON-APPOINTMENT (OUTPATIENT)
Age: 69
End: 2024-02-29

## 2024-02-29 ENCOUNTER — APPOINTMENT (OUTPATIENT)
Dept: CARDIOLOGY | Facility: CLINIC | Age: 69
End: 2024-02-29
Payer: MEDICARE

## 2024-02-29 VITALS
BODY MASS INDEX: 27.14 KG/M2 | DIASTOLIC BLOOD PRESSURE: 73 MMHG | WEIGHT: 177 LBS | HEART RATE: 80 BPM | TEMPERATURE: 98 F | OXYGEN SATURATION: 97 % | SYSTOLIC BLOOD PRESSURE: 144 MMHG | HEIGHT: 67.72 IN

## 2024-02-29 VITALS — DIASTOLIC BLOOD PRESSURE: 70 MMHG | SYSTOLIC BLOOD PRESSURE: 110 MMHG

## 2024-02-29 DIAGNOSIS — I51.7 CARDIOMEGALY: ICD-10-CM

## 2024-02-29 DIAGNOSIS — Z95.5 PRESENCE OF CORONARY ANGIOPLASTY IMPLANT AND GRAFT: ICD-10-CM

## 2024-02-29 PROCEDURE — G2211 COMPLEX E/M VISIT ADD ON: CPT

## 2024-02-29 PROCEDURE — 93000 ELECTROCARDIOGRAM COMPLETE: CPT

## 2024-02-29 PROCEDURE — 99215 OFFICE O/P EST HI 40 MIN: CPT

## 2024-02-29 RX ORDER — METOPROLOL SUCCINATE 50 MG/1
50 TABLET, EXTENDED RELEASE ORAL
Qty: 90 | Refills: 1 | Status: ACTIVE | COMMUNITY
Start: 2023-07-21 | End: 1900-01-01

## 2024-02-29 NOTE — PHYSICAL EXAM
[Well Developed] : well developed [Well Nourished] : well nourished [Normal Venous Pressure] : normal venous pressure [No Acute Distress] : no acute distress [Normal S1, S2] : normal S1, S2 [Good Air Entry] : good air entry [Clear Lung Fields] : clear lung fields [No Respiratory Distress] : no respiratory distress  [Non Tender] : non-tender [Soft] : abdomen soft [No Edema] : no edema [Moves all extremities] : moves all extremities [No Focal Deficits] : no focal deficits [Alert and Oriented] : alert and oriented [de-identified] : 3/6 blowing systolic ejection murmur LUSB

## 2024-02-29 NOTE — DISCUSSION/SUMMARY
[FreeTextEntry1] : 68F with HLD, CAD/PCI presents for f/u  1. CAD -s/p PCI to RCA 11/2020 -pt feeling well, denies cp -cont asa lipitor 80 toprol 50  2. carotid plaque  -cont asa, statin -asymptomatic, will monitor  3. HTN -cont current regimen of bb, lisinopril and hctz -cont bp log -will monitor -pt cont to exercise at a gym, discussed w pt that weight loss can improve bp as well  f/u 5 months unless requires sooner 40 min spent on complete encounter. all Q's answered.   [EKG obtained to assist in diagnosis and management of assessed problem(s)] : EKG obtained to assist in diagnosis and management of assessed problem(s)

## 2024-02-29 NOTE — HISTORY OF PRESENT ILLNESS
[FreeTextEntry1] : 68F with HLD, CAD/PCI presents for f/u PMD Dr Concetta Valdivia/Dr Lola Flores. pulm: Dr Tea Zhu  previously, pt initially with ENCINAS 9/2020, echo done at that time revealed grossly normal LV systolic function, mild LVH, MAC and AV calcification with normal opening. Carotid studies revealed b/l 16-49% stenosis. pt was sent for a nuclear stress test which revealed medium sized moderate defects in the distal inferior, inferolateral walls that were reversible. and pt was sent for cardiac cath (11/2020) where she Recieved a 3mm x 24mm JAE to a 90% RCA lesion. TTE did reveal increased RV wall thickness, pt had cMRI, which showed no evidence of myocarditis, cardiomyopathy, or ARVD. She also had a pulm eval with a CT chest without evidence of parenchymal lung dz, no PE, and PFTs showing mild restrictive pattern and pt was placed on albuterol inhalers. 11/21, still with occasional cp, unchanged from her prev symptoms but she is "able to handle it" pt with significant stress at work, unable to tolerate her boss, stated she felt like she was "not allowed" to quit. bp cont to be elevated, on lopressor 25 bid, and lisinopril 20, and about to start hctz 12.5. 8/22, endorsing acute onset of LUE numbness (no LE or facial involvement, no motor deficits) pt advised to go to the ER to eval for TIA. CT head and MRI both showing no cerebral infarct or ischemia. 9/22, feeling well. no complaints. 1/23, feeling well. TTE showing LVOT gradient up to 29 mm hg with valsalva last seen 7/23, feeling well, trying to exercise more.    pt now presents for follow up. today,  pt feeling well. pt denies cp sob palpitations. no LE edema, syncope. persistent chronic back pain, shoulder pain. pt continuing at the gym, working with a , no cp or sob on exercise, states exercise tolerance has been improvng   states full compliance with her meds, pt on toprol 50, lisinopril-hctz 20-12.5   EKG:  SR, TWI (seen on prev)  CAD s/p PCI on asa, stent was 11/2020.  recent labs 8/14/2020: tot chol 205 tg 102 hdl 79 ldl 105 Cr 0.58 a1c 6.3 pt received covid vaccine (pfizer)  Med hx: HLD, "heart murmur", chronic back pain, thyroid nodules OBGYN hx: does not have children. Currently post menopause Sx hx: back sx, hand sx, cataract, hip replacement, removal of benign parotid gland tumor. Fam hx: M: breast CA, F: PPM Social hx: lives in [place], with twin sister, . quit tob 3/2020 (30 pk yr hx), drinks wine a couple times a week. denies drug use. Meds: lipitor 80 protonix bid, cimetidine, asa, toprol 50. lisinopril-HCTZ 20-12.5 glimiperide 1 mg bid, albuterol inhaler. trulicity Allergies: neosporin, bacitracin (rash).

## 2024-02-29 NOTE — REVIEW OF SYSTEMS
[Fever] : no fever [Headache] : no headache [Weight Gain (___ Lbs)] : no recent weight gain [Chills] : no chills [Weight Loss (___ Lbs)] : no recent weight loss [Feeling Fatigued] : not feeling fatigued [Blurry Vision] : no blurred vision [Sore Throat] : no sore throat [SOB] : no shortness of breath [Dyspnea on exertion] : not dyspnea during exertion [Chest Discomfort] : no chest discomfort [Lower Ext Edema] : no extremity edema [Palpitations] : no palpitations [Orthopnea] : no orthopnea [Cough] : no cough [Syncope] : no syncope [Wheezing] : no wheezing [Nausea] : no nausea [Vomiting] : no vomiting [Dizziness] : no dizziness [Confusion] : no confusion was observed [Easy Bleeding] : no tendency for easy bleeding [Easy Bruising] : no tendency for easy bruising

## 2024-03-11 ENCOUNTER — RX RENEWAL (OUTPATIENT)
Age: 69
End: 2024-03-11

## 2024-03-12 ENCOUNTER — APPOINTMENT (OUTPATIENT)
Dept: OTOLARYNGOLOGY | Facility: CLINIC | Age: 69
End: 2024-03-12

## 2024-04-04 ENCOUNTER — APPOINTMENT (OUTPATIENT)
Dept: CT IMAGING | Facility: CLINIC | Age: 69
End: 2024-04-04
Payer: MEDICARE

## 2024-04-04 PROCEDURE — 70486 CT MAXILLOFACIAL W/O DYE: CPT | Mod: MH

## 2024-04-15 ENCOUNTER — LABORATORY RESULT (OUTPATIENT)
Age: 69
End: 2024-04-15

## 2024-04-15 ENCOUNTER — APPOINTMENT (OUTPATIENT)
Dept: INTERNAL MEDICINE | Facility: CLINIC | Age: 69
End: 2024-04-15
Payer: MEDICARE

## 2024-04-15 VITALS
WEIGHT: 181 LBS | SYSTOLIC BLOOD PRESSURE: 130 MMHG | HEART RATE: 72 BPM | OXYGEN SATURATION: 95 % | HEIGHT: 67.72 IN | DIASTOLIC BLOOD PRESSURE: 73 MMHG | TEMPERATURE: 97.5 F | BODY MASS INDEX: 27.75 KG/M2

## 2024-04-15 DIAGNOSIS — I10 ESSENTIAL (PRIMARY) HYPERTENSION: ICD-10-CM

## 2024-04-15 DIAGNOSIS — K21.9 GASTRO-ESOPHAGEAL REFLUX DISEASE W/OUT ESOPHAGITIS: ICD-10-CM

## 2024-04-15 DIAGNOSIS — M81.0 AGE-RELATED OSTEOPOROSIS W/OUT CURRENT PATHOLOGICAL FRACTURE: ICD-10-CM

## 2024-04-15 DIAGNOSIS — E78.5 HYPERLIPIDEMIA, UNSPECIFIED: ICD-10-CM

## 2024-04-15 DIAGNOSIS — I25.10 ATHEROSCLEROTIC HEART DISEASE OF NATIVE CORONARY ARTERY W/OUT ANGINA PECTORIS: ICD-10-CM

## 2024-04-15 DIAGNOSIS — Z87.39 PERSONAL HISTORY OF OTHER DISEASES OF THE MUSCULOSKELETAL SYSTEM AND CONNECTIVE TISSUE: ICD-10-CM

## 2024-04-15 DIAGNOSIS — Z87.891 PERSONAL HISTORY OF NICOTINE DEPENDENCE: ICD-10-CM

## 2024-04-15 DIAGNOSIS — R74.8 ABNORMAL LEVELS OF OTHER SERUM ENZYMES: ICD-10-CM

## 2024-04-15 DIAGNOSIS — U07.1 COVID-19: ICD-10-CM

## 2024-04-15 DIAGNOSIS — J98.4 OTHER DISORDERS OF LUNG: ICD-10-CM

## 2024-04-15 DIAGNOSIS — I65.29 OCCLUSION AND STENOSIS OF UNSPECIFIED CAROTID ARTERY: ICD-10-CM

## 2024-04-15 DIAGNOSIS — D32.9 BENIGN NEOPLASM OF MENINGES, UNSPECIFIED: ICD-10-CM

## 2024-04-15 DIAGNOSIS — R05.8 OTHER SPECIFIED COUGH: ICD-10-CM

## 2024-04-15 DIAGNOSIS — R73.01 IMPAIRED FASTING GLUCOSE: ICD-10-CM

## 2024-04-15 DIAGNOSIS — M25.512 PAIN IN LEFT SHOULDER: ICD-10-CM

## 2024-04-15 DIAGNOSIS — E11.9 TYPE 2 DIABETES MELLITUS W/OUT COMPLICATIONS: ICD-10-CM

## 2024-04-15 DIAGNOSIS — H04.209 OTHER SPECIFIED DISORDERS OF NOSE AND NASAL SINUSES: ICD-10-CM

## 2024-04-15 DIAGNOSIS — R15.2 FECAL URGENCY: ICD-10-CM

## 2024-04-15 DIAGNOSIS — I21.01: ICD-10-CM

## 2024-04-15 DIAGNOSIS — F32.A DEPRESSION, UNSPECIFIED: ICD-10-CM

## 2024-04-15 DIAGNOSIS — Z87.898 PERSONAL HISTORY OF OTHER SPECIFIED CONDITIONS: ICD-10-CM

## 2024-04-15 DIAGNOSIS — M47.817 SPONDYLOSIS W/OUT MYELOPATHY OR RADICULOPATHY, LUMBOSACRAL REGION: ICD-10-CM

## 2024-04-15 DIAGNOSIS — J34.89 OTHER SPECIFIED DISORDERS OF NOSE AND NASAL SINUSES: ICD-10-CM

## 2024-04-15 DIAGNOSIS — R79.89 OTHER SPECIFIED ABNORMAL FINDINGS OF BLOOD CHEMISTRY: ICD-10-CM

## 2024-04-15 DIAGNOSIS — H40.9 UNSPECIFIED GLAUCOMA: ICD-10-CM

## 2024-04-15 LAB
ALBUMIN SERPL ELPH-MCNC: 4.5 G/DL
ALP BLD-CCNC: 161 U/L
ALT SERPL-CCNC: 68 U/L
ANION GAP SERPL CALC-SCNC: 15 MMOL/L
APPEARANCE: CLEAR
AST SERPL-CCNC: 38 U/L
BASOPHILS # BLD AUTO: 0.04 K/UL
BASOPHILS NFR BLD AUTO: 0.6 %
BILIRUB SERPL-MCNC: 0.5 MG/DL
BILIRUBIN URINE: NEGATIVE
BLOOD URINE: NEGATIVE
BUN SERPL-MCNC: 24 MG/DL
CALCIUM SERPL-MCNC: 9.4 MG/DL
CHLORIDE SERPL-SCNC: 105 MMOL/L
CHOLEST SERPL-MCNC: 199 MG/DL
CO2 SERPL-SCNC: 18 MMOL/L
COLOR: YELLOW
CREAT SERPL-MCNC: 0.78 MG/DL
EGFR: 83 ML/MIN/1.73M2
EOSINOPHIL # BLD AUTO: 0.15 K/UL
EOSINOPHIL NFR BLD AUTO: 2.1 %
ESTIMATED AVERAGE GLUCOSE: 154 MG/DL
GLUCOSE QUALITATIVE U: NEGATIVE MG/DL
GLUCOSE SERPL-MCNC: 190 MG/DL
HBA1C MFR BLD HPLC: 7 %
HCT VFR BLD CALC: 38.7 %
HDLC SERPL-MCNC: 67 MG/DL
HGB BLD-MCNC: 13.1 G/DL
IMM GRANULOCYTES NFR BLD AUTO: 0.4 %
KETONES URINE: NEGATIVE MG/DL
LDLC SERPL CALC-MCNC: 109 MG/DL
LEUKOCYTE ESTERASE URINE: ABNORMAL
LYMPHOCYTES # BLD AUTO: 1.47 K/UL
LYMPHOCYTES NFR BLD AUTO: 20.9 %
MAN DIFF?: NORMAL
MCHC RBC-ENTMCNC: 31.3 PG
MCHC RBC-ENTMCNC: 33.9 GM/DL
MCV RBC AUTO: 92.6 FL
MONOCYTES # BLD AUTO: 0.77 K/UL
MONOCYTES NFR BLD AUTO: 11 %
NEUTROPHILS # BLD AUTO: 4.57 K/UL
NEUTROPHILS NFR BLD AUTO: 65 %
NITRITE URINE: NEGATIVE
NONHDLC SERPL-MCNC: 132 MG/DL
PH URINE: 6
PLATELET # BLD AUTO: 314 K/UL
POTASSIUM SERPL-SCNC: 4.8 MMOL/L
PROT SERPL-MCNC: 6.9 G/DL
PROTEIN URINE: NEGATIVE MG/DL
RBC # BLD: 4.18 M/UL
RBC # FLD: 12.6 %
SODIUM SERPL-SCNC: 138 MMOL/L
SPECIFIC GRAVITY URINE: 1.02
TRIGL SERPL-MCNC: 132 MG/DL
UROBILINOGEN URINE: 0.2 MG/DL
WBC # FLD AUTO: 7.03 K/UL

## 2024-04-15 PROCEDURE — G2211 COMPLEX E/M VISIT ADD ON: CPT

## 2024-04-15 PROCEDURE — 99214 OFFICE O/P EST MOD 30 MIN: CPT

## 2024-04-15 PROCEDURE — 36415 COLL VENOUS BLD VENIPUNCTURE: CPT

## 2024-04-15 RX ORDER — ALBUTEROL SULFATE 90 UG/1
108 (90 BASE) INHALANT RESPIRATORY (INHALATION)
Qty: 1 | Refills: 3 | Status: ACTIVE | COMMUNITY
Start: 2021-02-09 | End: 1900-01-01

## 2024-04-15 RX ORDER — AMOXICILLIN 500 MG/1
500 CAPSULE ORAL
Qty: 20 | Refills: 0 | Status: DISCONTINUED | COMMUNITY
Start: 2022-07-11 | End: 2024-04-15

## 2024-04-15 RX ORDER — DOXYCYCLINE HYCLATE 100 MG/1
100 TABLET ORAL
Qty: 14 | Refills: 0 | Status: DISCONTINUED | COMMUNITY
Start: 2024-02-22 | End: 2024-04-15

## 2024-04-15 RX ORDER — MOLNUPIRAVIR 200 MG/1
200 CAPSULE ORAL TWICE DAILY
Qty: 40 | Refills: 0 | Status: DISCONTINUED | COMMUNITY
Start: 2024-01-11 | End: 2024-04-15

## 2024-04-15 RX ORDER — DULAGLUTIDE 1.5 MG/.5ML
1.5 INJECTION, SOLUTION SUBCUTANEOUS
Qty: 3 | Refills: 0 | Status: DISCONTINUED | COMMUNITY
Start: 2022-08-09 | End: 2024-04-15

## 2024-04-15 RX ORDER — LISINOPRIL AND HYDROCHLOROTHIAZIDE TABLETS 20; 12.5 MG/1; MG/1
20-12.5 TABLET ORAL
Qty: 90 | Refills: 1 | Status: ACTIVE | COMMUNITY
Start: 2021-09-02 | End: 1900-01-01

## 2024-04-15 RX ORDER — AMLODIPINE BESYLATE 10 MG/1
10 TABLET ORAL
Qty: 90 | Refills: 1 | Status: DISCONTINUED | COMMUNITY
End: 2024-04-15

## 2024-04-15 RX ORDER — METOPROLOL TARTRATE 25 MG/1
25 TABLET, FILM COATED ORAL
Qty: 180 | Refills: 1 | Status: DISCONTINUED | COMMUNITY
Start: 2020-11-16 | End: 2024-04-15

## 2024-04-15 RX ORDER — DOXYCYCLINE 100 MG/1
100 CAPSULE ORAL TWICE DAILY
Qty: 14 | Refills: 0 | Status: DISCONTINUED | COMMUNITY
Start: 2024-02-22 | End: 2024-04-15

## 2024-04-15 RX ORDER — DICLOFENAC SODIUM 75 MG/1
75 TABLET, DELAYED RELEASE ORAL
Qty: 60 | Refills: 0 | Status: DISCONTINUED | COMMUNITY
Start: 2023-09-13 | End: 2024-04-15

## 2024-04-15 RX ORDER — FLUTICASONE PROPIONATE AND SALMETEROL 113; 14 UG/1; UG/1
113-14 POWDER, METERED RESPIRATORY (INHALATION)
Qty: 1 | Refills: 2 | Status: ACTIVE | COMMUNITY
Start: 2021-03-24 | End: 1900-01-01

## 2024-04-16 ENCOUNTER — OFFICE (OUTPATIENT)
Facility: LOCATION | Age: 69
Setting detail: OPHTHALMOLOGY
End: 2024-04-16
Payer: MEDICARE

## 2024-04-16 DIAGNOSIS — H16.223: ICD-10-CM

## 2024-04-16 DIAGNOSIS — H40.1124: ICD-10-CM

## 2024-04-16 DIAGNOSIS — H40.1111: ICD-10-CM

## 2024-04-16 DIAGNOSIS — D32.0: ICD-10-CM

## 2024-04-16 PROCEDURE — 92133 CPTRZD OPH DX IMG PST SGM ON: CPT | Performed by: OPHTHALMOLOGY

## 2024-04-16 PROCEDURE — 76514 ECHO EXAM OF EYE THICKNESS: CPT | Performed by: OPHTHALMOLOGY

## 2024-04-16 PROCEDURE — 99214 OFFICE O/P EST MOD 30 MIN: CPT | Performed by: OPHTHALMOLOGY

## 2024-04-16 PROCEDURE — 92083 EXTENDED VISUAL FIELD XM: CPT | Performed by: OPHTHALMOLOGY

## 2024-04-16 ASSESSMENT — LID POSITION - PTOSIS
OD_PTOSIS: ABSENT
OS_PTOSIS: ABSENT

## 2024-06-05 NOTE — ASU PATIENT PROFILE, ADULT - CONTRAINDICATED REASON
"      Subjective     Chief Complaint:    Chief Complaint   Patient presents with    Hypothyroidism       History of Present Illness:   Here for f/u,  No further chest pain since she had gallbladder attack, she currenly has no symptoms  ED course reviewed, no labs were performed  Hypothyroidism due to hashimotos, remains fatigue  Wonders about pcos due to fatigue and hair patters with struggle to loose weight       Review of Systems  Gen- No fevers, chills  CV- No chest pain, palpitations  Resp- No cough, dyspnea  GI- No N/V/D, abd pain  Neuro-No dizziness, headaches      I have reviewed and/or updated the patient's past medical, surgical, family, social history and problem list as appropriate.     Medications:    Current Outpatient Medications:     Dihydroergotamine Mesylate HFA (Trudhesa) 0.725 MG/ACT aerosol solution, 0.725 mg into the nostril(s) as directed by provider As Needed (HA)., Disp: 1 mL, Rfl: 0    ferrous sulfate 324 (65 Fe) MG tablet delayed-release EC tablet, Take 1 tablet by mouth with vitamin C three times weekly on Monday, Wednesday, Friday, Disp: 12 tablet, Rfl: 2    levothyroxine (SYNTHROID, LEVOTHROID) 112 MCG tablet, Take 1 tablet by mouth Every Morning., Disp: 90 tablet, Rfl: 3    Rimegepant Sulfate (Nurtec) 75 MG tablet dispersible tablet, Take 1 tablet by mouth Every Other Day. (Patient taking differently: Take 1 tablet by mouth Daily As Needed.), Disp: 15 tablet, Rfl: 6    Allergies:  No Known Allergies    Objective     Vital Signs:   Vitals:    06/05/24 1032   BP: 95/60   Pulse: 88   SpO2: 97%   Weight: 87.5 kg (193 lb)   Height: 160 cm (63\")     Body mass index is 34.19 kg/m².    Physical Exam:    Physical Exam  Vitals and nursing note reviewed.   Constitutional:       Appearance: She is well-developed.   HENT:      Head: Normocephalic and atraumatic.   Eyes:      Pupils: Pupils are equal, round, and reactive to light.   Cardiovascular:      Rate and Rhythm: Normal rate and regular " rhythm.      Heart sounds: Normal heart sounds.   Pulmonary:      Effort: Pulmonary effort is normal.      Breath sounds: Normal breath sounds.   Abdominal:      General: Bowel sounds are normal. There is no distension.      Palpations: Abdomen is soft.      Tenderness: There is no abdominal tenderness.   Musculoskeletal:      Cervical back: Neck supple.   Skin:     General: Skin is warm and dry.   Neurological:      General: No focal deficit present.      Mental Status: She is alert and oriented to person, place, and time.   Psychiatric:         Mood and Affect: Mood normal.         Behavior: Behavior normal.         Assessment / Plan     Assessment/Plan:   Problem List Items Addressed This Visit    None  Visit Diagnoses       Hirsutism    -  Primary    Relevant Orders    Testosterone (Completed)    DHEA-Sulfate (Completed)    Insulin, Total (Completed)    FSH & LH (Completed)    Estradiol (Completed)    Hypothyroidism due to Hashimoto's thyroiditis        Relevant Orders    TSH Rfx On Abnormal To Free T4 (Completed)    Enlarged thyroid        Relevant Orders    US Thyroid    Gallstones        Relevant Orders    US Gallbladder    Comprehensive Metabolic Panel (Completed)    CBC (No Diff) (Completed)              Discussed plan of care in detail with pt today; pt verb understanding and agrees.    Follow up:  As scheduled    Electronically signed by LENA Branham   06/05/2024 10:58 EDT      Please note that portions of this note were completed with a voice recognition program.    considering quitting

## 2024-06-12 ENCOUNTER — APPOINTMENT (OUTPATIENT)
Dept: ORTHOPEDIC SURGERY | Facility: CLINIC | Age: 69
End: 2024-06-12
Payer: MEDICARE

## 2024-06-12 VITALS — BODY MASS INDEX: 27.28 KG/M2 | HEIGHT: 68 IN | WEIGHT: 180 LBS

## 2024-06-12 PROCEDURE — 99204 OFFICE O/P NEW MOD 45 MIN: CPT

## 2024-06-12 RX ORDER — METHYLPREDNISOLONE 4 MG/1
4 TABLET ORAL
Qty: 21 | Refills: 1 | Status: ACTIVE | COMMUNITY
Start: 2024-06-12 | End: 1900-01-01

## 2024-06-12 NOTE — HISTORY OF PRESENT ILLNESS
[de-identified] : 69-year female presents for initial evaluation of right sided lower back pain with radiation down the RLE x 2 weeks. S/P lumbar laminectomy with me in 2000.  Denies injury. She states that the pain radiates to the right buttock and radiates posteriorly down the RLE to the foot, with numbness/tingling of the foot.  Pain is worsened with sitting, laying, walking, and bending.  Has been taking advil for the pain which helps temporarily. Has not tried PT or chiropractic care Denies EVELINA. PMHx: DM 2, HTN No fever, chills, sweats, nausea/vomiting. No bowel or bladder dysfunction, no recent weight loss or gain. No night pain. This history is in addition to the intake form that I personally reviewed.   She states the symptoms are stable.  [Stable] : stable

## 2024-06-12 NOTE — ADDENDUM
[FreeTextEntry1] : This note was written by Brook Harvey on 06/12/2024 acting as scribe for Dr. Emesron Mathur M.D.   I, Emerson Mathur MD, have read and attest that all the information, medical decision making and discharge instructions within are true and accurate.

## 2024-06-12 NOTE — DISCUSSION/SUMMARY
[de-identified] :  Lumbar degenerative disc disease Sacroiliitis right  Discussed all options. Lumber MRI  Provided MDP  F/U after MRI in 1 week  All options discussed including rest, medicine, home exercise, acupuncture, Chiropractic care, Physical Therapy, Pain management, and last resort surgery. All questions were answered, all alternatives discussed, and the patient is in complete agreement with the treatment plan which the patient contributed to and discussed with me through the shared decision-making process. Follow-up appointment as instructed. Any issues and the patient will call or come in sooner.

## 2024-06-12 NOTE — PHYSICAL EXAM
[Normal] : Gait: normal [Rai's Sign] : negative Rai's sign [Pronator Drift] : negative pronator drift [SLR] : negative straight leg raise [de-identified] : 5 out of 5 motor strength, sensation is intact and symmetrical full range of motion flexion extension and rotation, no palpatory tenderness full range of motion of hips knees shoulders and elbows (all four extremities), no atrophy, negative straight leg raise, no pathological reflexes, no swelling, normal ambulation, no apparent distress skin is intact, no palpable lymph nodes, no upper or lower extremity instability, alert and oriented x3 and normal mood. Normal finger-to nose test.  Pain over right SI joint  [de-identified] : XR AP Lat Lumbar 06/12/2024 -Mild degenerative changes - reviewed with patient.

## 2024-06-13 ENCOUNTER — APPOINTMENT (OUTPATIENT)
Dept: MRI IMAGING | Facility: CLINIC | Age: 69
End: 2024-06-13
Payer: MEDICARE

## 2024-06-13 PROCEDURE — 72148 MRI LUMBAR SPINE W/O DYE: CPT

## 2024-06-16 ENCOUNTER — RX RENEWAL (OUTPATIENT)
Age: 69
End: 2024-06-16

## 2024-06-16 RX ORDER — GLIMEPIRIDE 1 MG/1
1 TABLET ORAL
Qty: 180 | Refills: 0 | Status: ACTIVE | COMMUNITY
Start: 2021-08-10 | End: 1900-01-01

## 2024-06-18 ENCOUNTER — NON-APPOINTMENT (OUTPATIENT)
Age: 69
End: 2024-06-18

## 2024-06-26 ENCOUNTER — APPOINTMENT (OUTPATIENT)
Dept: ORTHOPEDIC SURGERY | Facility: CLINIC | Age: 69
End: 2024-06-26
Payer: MEDICARE

## 2024-06-26 VITALS
SYSTOLIC BLOOD PRESSURE: 134 MMHG | HEART RATE: 73 BPM | WEIGHT: 180 LBS | BODY MASS INDEX: 27.28 KG/M2 | HEIGHT: 68 IN | DIASTOLIC BLOOD PRESSURE: 75 MMHG

## 2024-06-26 DIAGNOSIS — M54.16 RADICULOPATHY, LUMBAR REGION: ICD-10-CM

## 2024-06-26 PROCEDURE — 99214 OFFICE O/P EST MOD 30 MIN: CPT

## 2024-07-02 NOTE — H&P PST ADULT - CIGARETTES, PACKS/DAY
Patient with known Cirrhosis with Child's class Calculator for Child's score link- [email or web address]/child-farr-score-cirrhosis-mortality#creator-insights. Co-morbidities are  unknown .  MELD-Na score calculated; MELD 3.0: 19 at 7/2/2024  2:25 AM  MELD-Na: 15 at 7/2/2024  2:25 AM  Calculated from:  Serum Creatinine: 1.1 mg/dL at 7/2/2024  2:25 AM  Serum Sodium: 138 mmol/L (Using max of 137 mmol/L) at 7/2/2024  2:25 AM  Total Bilirubin: 4.4 mg/dL at 7/2/2024  2:25 AM  Serum Albumin: 1.6 g/dL at 7/2/2024  2:25 AM  INR(ratio): 1.2 at 7/1/2024  7:47 AM  Age at listing (hypothetical): 82 years  Sex: Male at 7/2/2024  2:25 AM      Continue chronic meds. Etiology likely  unknown . Will avoid any hepatotoxic meds, and monitor CBC/CMP/INR for synthetic function.    0.5

## 2024-07-09 ENCOUNTER — NON-APPOINTMENT (OUTPATIENT)
Age: 69
End: 2024-07-09

## 2024-07-10 ENCOUNTER — APPOINTMENT (OUTPATIENT)
Dept: ORTHOPEDIC SURGERY | Facility: CLINIC | Age: 69
End: 2024-07-10
Payer: MEDICARE

## 2024-07-10 VITALS — WEIGHT: 180 LBS | BODY MASS INDEX: 27.28 KG/M2 | HEIGHT: 68 IN

## 2024-07-10 DIAGNOSIS — M54.16 RADICULOPATHY, LUMBAR REGION: ICD-10-CM

## 2024-07-10 PROCEDURE — 99214 OFFICE O/P EST MOD 30 MIN: CPT

## 2024-07-18 ENCOUNTER — NON-APPOINTMENT (OUTPATIENT)
Age: 69
End: 2024-07-18

## 2024-07-18 ENCOUNTER — APPOINTMENT (OUTPATIENT)
Dept: CARDIOLOGY | Facility: CLINIC | Age: 69
End: 2024-07-18
Payer: MEDICARE

## 2024-07-18 ENCOUNTER — APPOINTMENT (OUTPATIENT)
Dept: ORTHOPEDIC SURGERY | Facility: CLINIC | Age: 69
End: 2024-07-18

## 2024-07-18 VITALS
BODY MASS INDEX: 27.28 KG/M2 | WEIGHT: 180 LBS | HEIGHT: 68 IN | OXYGEN SATURATION: 97 % | HEART RATE: 79 BPM | SYSTOLIC BLOOD PRESSURE: 111 MMHG | DIASTOLIC BLOOD PRESSURE: 67 MMHG

## 2024-07-18 DIAGNOSIS — I10 ESSENTIAL (PRIMARY) HYPERTENSION: ICD-10-CM

## 2024-07-18 DIAGNOSIS — I25.10 ATHEROSCLEROTIC HEART DISEASE OF NATIVE CORONARY ARTERY W/OUT ANGINA PECTORIS: ICD-10-CM

## 2024-07-18 DIAGNOSIS — E78.5 HYPERLIPIDEMIA, UNSPECIFIED: ICD-10-CM

## 2024-07-18 DIAGNOSIS — R94.31 ABNORMAL ELECTROCARDIOGRAM [ECG] [EKG]: ICD-10-CM

## 2024-07-18 PROCEDURE — 99215 OFFICE O/P EST HI 40 MIN: CPT

## 2024-07-18 PROCEDURE — G2211 COMPLEX E/M VISIT ADD ON: CPT

## 2024-07-18 PROCEDURE — 93000 ELECTROCARDIOGRAM COMPLETE: CPT

## 2024-08-07 ENCOUNTER — RX RENEWAL (OUTPATIENT)
Age: 69
End: 2024-08-07

## 2024-08-16 ENCOUNTER — TRANSCRIPTION ENCOUNTER (OUTPATIENT)
Age: 69
End: 2024-08-16

## 2024-08-17 ENCOUNTER — TRANSCRIPTION ENCOUNTER (OUTPATIENT)
Age: 69
End: 2024-08-17

## 2024-08-20 ENCOUNTER — OFFICE (OUTPATIENT)
Facility: LOCATION | Age: 69
Setting detail: OPHTHALMOLOGY
End: 2024-08-20
Payer: MEDICARE

## 2024-08-20 DIAGNOSIS — H40.1124: ICD-10-CM

## 2024-08-20 DIAGNOSIS — H40.1111: ICD-10-CM

## 2024-08-20 DIAGNOSIS — H16.223: ICD-10-CM

## 2024-08-20 DIAGNOSIS — D32.0: ICD-10-CM

## 2024-08-20 PROCEDURE — 99213 OFFICE O/P EST LOW 20 MIN: CPT | Performed by: OPHTHALMOLOGY

## 2024-08-20 ASSESSMENT — CONFRONTATIONAL VISUAL FIELD TEST (CVF)
OS_FINDINGS: FULL
OD_FINDINGS: FULL

## 2024-08-20 ASSESSMENT — LID POSITION - PTOSIS
OD_PTOSIS: ABSENT
OS_PTOSIS: ABSENT

## 2024-08-23 ENCOUNTER — LABORATORY RESULT (OUTPATIENT)
Age: 69
End: 2024-08-23

## 2024-08-23 ENCOUNTER — APPOINTMENT (OUTPATIENT)
Dept: INTERNAL MEDICINE | Facility: CLINIC | Age: 69
End: 2024-08-23

## 2024-08-23 VITALS
HEART RATE: 98 BPM | RESPIRATION RATE: 17 BRPM | OXYGEN SATURATION: 99 % | WEIGHT: 180 LBS | TEMPERATURE: 97.3 F | BODY MASS INDEX: 27.28 KG/M2 | HEIGHT: 68 IN | DIASTOLIC BLOOD PRESSURE: 80 MMHG | SYSTOLIC BLOOD PRESSURE: 167 MMHG

## 2024-08-23 VITALS — SYSTOLIC BLOOD PRESSURE: 140 MMHG | DIASTOLIC BLOOD PRESSURE: 80 MMHG

## 2024-08-23 DIAGNOSIS — T78.40XA ALLERGY, UNSPECIFIED, INITIAL ENCOUNTER: ICD-10-CM

## 2024-08-23 DIAGNOSIS — M47.817 SPONDYLOSIS W/OUT MYELOPATHY OR RADICULOPATHY, LUMBOSACRAL REGION: ICD-10-CM

## 2024-08-23 DIAGNOSIS — Z12.31 ENCOUNTER FOR SCREENING MAMMOGRAM FOR MALIGNANT NEOPLASM OF BREAST: ICD-10-CM

## 2024-08-23 DIAGNOSIS — M54.16 RADICULOPATHY, LUMBAR REGION: ICD-10-CM

## 2024-08-23 DIAGNOSIS — Z00.00 ENCOUNTER FOR GENERAL ADULT MEDICAL EXAMINATION W/OUT ABNORMAL FINDINGS: ICD-10-CM

## 2024-08-23 DIAGNOSIS — D32.9 BENIGN NEOPLASM OF MENINGES, UNSPECIFIED: ICD-10-CM

## 2024-08-23 DIAGNOSIS — E04.1 NONTOXIC SINGLE THYROID NODULE: ICD-10-CM

## 2024-08-23 DIAGNOSIS — R11.0 NAUSEA: ICD-10-CM

## 2024-08-23 DIAGNOSIS — Z95.5 PRESENCE OF CORONARY ANGIOPLASTY IMPLANT AND GRAFT: ICD-10-CM

## 2024-08-23 DIAGNOSIS — M81.0 AGE-RELATED OSTEOPOROSIS W/OUT CURRENT PATHOLOGICAL FRACTURE: ICD-10-CM

## 2024-08-23 DIAGNOSIS — R15.2 FECAL URGENCY: ICD-10-CM

## 2024-08-23 DIAGNOSIS — R73.01 IMPAIRED FASTING GLUCOSE: ICD-10-CM

## 2024-08-23 PROBLEM — E11.43 DIABETIC AUTONOMIC NEUROPATHY ASSOCIATED WITH TYPE 2 DIABETES MELLITUS: Status: ACTIVE | Noted: 2024-08-23

## 2024-08-23 PROCEDURE — G0296 VISIT TO DETERM LDCT ELIG: CPT

## 2024-08-23 PROCEDURE — G0439: CPT

## 2024-08-23 PROCEDURE — 36415 COLL VENOUS BLD VENIPUNCTURE: CPT

## 2024-08-23 PROCEDURE — G2211 COMPLEX E/M VISIT ADD ON: CPT | Mod: NC

## 2024-08-23 PROCEDURE — 99214 OFFICE O/P EST MOD 30 MIN: CPT | Mod: 25

## 2024-08-24 ENCOUNTER — TRANSCRIPTION ENCOUNTER (OUTPATIENT)
Age: 69
End: 2024-08-24

## 2024-08-24 PROBLEM — R80.9 PROTEINURIA, UNSPECIFIED TYPE: Status: ACTIVE | Noted: 2024-08-24

## 2024-08-24 PROBLEM — R81 GLUCOSURIA: Status: ACTIVE | Noted: 2024-08-24

## 2024-08-24 PROBLEM — E11.65 UNCONTROLLED DIABETES MELLITUS WITH HYPERGLYCEMIA: Status: ACTIVE | Noted: 2024-08-24

## 2024-08-24 LAB
25(OH)D3 SERPL-MCNC: 52.9 NG/ML
ALBUMIN SERPL ELPH-MCNC: 4.5 G/DL
ALP BLD-CCNC: 151 U/L
ALT SERPL-CCNC: 58 U/L
ANION GAP SERPL CALC-SCNC: 15 MMOL/L
APPEARANCE: CLEAR
AST SERPL-CCNC: 25 U/L
BASOPHILS # BLD AUTO: 0.04 K/UL
BASOPHILS NFR BLD AUTO: 0.6 %
BILIRUB SERPL-MCNC: 0.7 MG/DL
BILIRUBIN URINE: NEGATIVE
BLOOD URINE: NEGATIVE
BUN SERPL-MCNC: 22 MG/DL
CALCIUM SERPL-MCNC: 9.9 MG/DL
CHLORIDE SERPL-SCNC: 98 MMOL/L
CHOLEST SERPL-MCNC: 176 MG/DL
CO2 SERPL-SCNC: 21 MMOL/L
COLOR: YELLOW
CREAT SERPL-MCNC: 0.64 MG/DL
CREAT SPEC-SCNC: 120 MG/DL
EGFR: 96 ML/MIN/1.73M2
EOSINOPHIL # BLD AUTO: 0.18 K/UL
EOSINOPHIL NFR BLD AUTO: 2.6 %
ESTIMATED AVERAGE GLUCOSE: 301 MG/DL
FOLATE SERPL-MCNC: 11.3 NG/ML
GLUCOSE QUALITATIVE U: >=1000 MG/DL
GLUCOSE SERPL-MCNC: 332 MG/DL
HBA1C MFR BLD HPLC: 12.1 %
HCT VFR BLD CALC: 37.8 %
HDLC SERPL-MCNC: 54 MG/DL
HGB BLD-MCNC: 13 G/DL
IMM GRANULOCYTES NFR BLD AUTO: 0.3 %
KETONES URINE: ABNORMAL MG/DL
LDLC SERPL CALC-MCNC: 91 MG/DL
LEUKOCYTE ESTERASE URINE: NEGATIVE
LYMPHOCYTES # BLD AUTO: 1.36 K/UL
LYMPHOCYTES NFR BLD AUTO: 19.7 %
MAN DIFF?: NORMAL
MCHC RBC-ENTMCNC: 31.3 PG
MCHC RBC-ENTMCNC: 34.4 GM/DL
MCV RBC AUTO: 90.9 FL
MICROALBUMIN 24H UR DL<=1MG/L-MCNC: 6.4 MG/DL
MICROALBUMIN/CREAT 24H UR-RTO: 54 MG/G
MONOCYTES # BLD AUTO: 0.69 K/UL
MONOCYTES NFR BLD AUTO: 10 %
NEUTROPHILS # BLD AUTO: 4.63 K/UL
NEUTROPHILS NFR BLD AUTO: 66.8 %
NITRITE URINE: NEGATIVE
NONHDLC SERPL-MCNC: 123 MG/DL
PH URINE: 5.5
PLATELET # BLD AUTO: 322 K/UL
POTASSIUM SERPL-SCNC: 4.5 MMOL/L
PROT SERPL-MCNC: 7 G/DL
PROTEIN URINE: 30 MG/DL
RBC # BLD: 4.16 M/UL
RBC # FLD: 12.1 %
SODIUM SERPL-SCNC: 135 MMOL/L
SPECIFIC GRAVITY URINE: 1.03
TRIGL SERPL-MCNC: 185 MG/DL
TSH SERPL-ACNC: 3.94 UIU/ML
UROBILINOGEN URINE: 0.2 MG/DL
VIT B12 SERPL-MCNC: 680 PG/ML
WBC # FLD AUTO: 6.92 K/UL

## 2024-08-28 ENCOUNTER — NON-APPOINTMENT (OUTPATIENT)
Age: 69
End: 2024-08-28

## 2024-08-29 ENCOUNTER — NON-APPOINTMENT (OUTPATIENT)
Age: 69
End: 2024-08-29

## 2024-08-29 NOTE — CONSULT LETTER
[FreeTextEntry2] : DM  [FreeTextEntry1] : Tanesha Kramer [FreeTextEntry3] : Pt is a 70yo female with h/o T2DM for a number of years found to have recent increase in A1c  from  7.0 in April to 12.1 %  in August 2024. IN past pt on  Trulicity and amaryl but has been off trulicity since  March/April 2024  due to insurance not covering GLP1RA  Current RX  amaryl 1 mg bid  Jardiance 25 mg qd - just started  PMH  T2DM  with autonomic neuropathy   Thyroid nodule- under care of Dr Bird Right nodule s/p FNA 2019 when it was  1.7 cm  then in 2020 when measured 2.0 cm - pathology c/w benign nodule  HTN CAD   Meningioma erosive gastritis cholelithiasis  Elevated LFT  osteoporosis  FH  DM- father  sister Ovarian tumor sister breast cancer mother  optic nerve  sheath meningioma sister   Soc HX   + smoker  occasional alcohol use  All  bacitracin  Neosporin    PSH  cataract surgery  LASIK   CAD  post stent  Hip replacement  Parotid tumor removal  meds  Atorvasstatin  Albuterol  ASA Flonase NASAL SPRAY Fluticasone- SALMETEROL INHALER  Amaryl 1mg bid Jardiance 25 mg qd Lisinopril HCT 20-12.5 mg qd Metoprolol ER 50 mg qd MVI  Tylenol   [FreeTextEntry4] : T2DM  loss of control off trulicity   pt started Jardiance   cont Amaryl 1 mg bid  Add Lantus insulin 10 units QD  have pt check sugars BID ac and qhs Medicare will cover CGM once pt is on insulin   can RX Oly 3  check- C-peptide JOHNNY AB, BMP, Islet cell Ab , to rule out JULIA  Pt became uncontrolled with cessation of trulicity  -will have pt meet with RD/CDE virtually  Will also ask for pt to come in person to see Endocrinology   Thyroid nodule- following with DR Bird - s/p 2 benign  FNA right lobe 2.0 cm nodule repeat sono this year  [FreeTextEntry5] : If there are any further questions, please reach out to me

## 2024-08-30 ENCOUNTER — NON-APPOINTMENT (OUTPATIENT)
Age: 69
End: 2024-08-30

## 2024-08-30 ENCOUNTER — APPOINTMENT (OUTPATIENT)
Dept: INTERNAL MEDICINE | Facility: CLINIC | Age: 69
End: 2024-08-30
Payer: MEDICARE

## 2024-08-30 DIAGNOSIS — H40.9 UNSPECIFIED GLAUCOMA: ICD-10-CM

## 2024-08-30 DIAGNOSIS — Z87.891 PERSONAL HISTORY OF NICOTINE DEPENDENCE: ICD-10-CM

## 2024-08-30 DIAGNOSIS — I10 ESSENTIAL (PRIMARY) HYPERTENSION: ICD-10-CM

## 2024-08-30 DIAGNOSIS — R81 GLYCOSURIA: ICD-10-CM

## 2024-08-30 DIAGNOSIS — E11.9 TYPE 2 DIABETES MELLITUS W/OUT COMPLICATIONS: ICD-10-CM

## 2024-08-30 DIAGNOSIS — E11.43 TYPE 2 DIABETES MELLITUS WITH DIABETIC AUTONOMIC (POLY)NEUROPATHY: ICD-10-CM

## 2024-08-30 DIAGNOSIS — E11.65 TYPE 2 DIABETES MELLITUS WITH HYPERGLYCEMIA: ICD-10-CM

## 2024-08-30 DIAGNOSIS — F32.A DEPRESSION, UNSPECIFIED: ICD-10-CM

## 2024-08-30 DIAGNOSIS — R80.9 PROTEINURIA, UNSPECIFIED: ICD-10-CM

## 2024-08-30 DIAGNOSIS — E78.5 HYPERLIPIDEMIA, UNSPECIFIED: ICD-10-CM

## 2024-08-30 DIAGNOSIS — R74.8 ABNORMAL LEVELS OF OTHER SERUM ENZYMES: ICD-10-CM

## 2024-08-30 DIAGNOSIS — R79.89 OTHER SPECIFIED ABNORMAL FINDINGS OF BLOOD CHEMISTRY: ICD-10-CM

## 2024-08-30 DIAGNOSIS — I21.01: ICD-10-CM

## 2024-08-30 DIAGNOSIS — K21.9 GASTRO-ESOPHAGEAL REFLUX DISEASE W/OUT ESOPHAGITIS: ICD-10-CM

## 2024-08-30 DIAGNOSIS — I25.10 ATHEROSCLEROTIC HEART DISEASE OF NATIVE CORONARY ARTERY W/OUT ANGINA PECTORIS: ICD-10-CM

## 2024-08-30 PROCEDURE — G2211 COMPLEX E/M VISIT ADD ON: CPT

## 2024-08-30 PROCEDURE — 99214 OFFICE O/P EST MOD 30 MIN: CPT

## 2024-08-30 RX ORDER — ELECTROLYTES/DEXTROSE
32G X 4 MM SOLUTION, ORAL ORAL
Qty: 1 | Refills: 0 | Status: ACTIVE | COMMUNITY
Start: 2024-08-30

## 2024-08-30 RX ORDER — BLOOD-GLUCOSE SENSOR
EACH MISCELLANEOUS
Qty: 6 | Refills: 1 | Status: ACTIVE | COMMUNITY
Start: 2024-08-30

## 2024-08-30 RX ORDER — INSULIN GLARGINE 100 [IU]/ML
100 INJECTION, SOLUTION SUBCUTANEOUS
Qty: 1 | Refills: 1 | Status: ACTIVE | COMMUNITY
Start: 2024-08-30 | End: 1900-01-01

## 2024-08-30 RX ORDER — BLOOD-GLUCOSE,RECEIVER,CONT
EACH MISCELLANEOUS
Qty: 1 | Refills: 0 | Status: ACTIVE | COMMUNITY
Start: 2024-08-30

## 2024-08-30 RX ORDER — EMPAGLIFLOZIN 25 MG/1
25 TABLET, FILM COATED ORAL
Qty: 90 | Refills: 1 | Status: ACTIVE | COMMUNITY
Start: 2024-08-27

## 2024-09-08 ENCOUNTER — NON-APPOINTMENT (OUTPATIENT)
Age: 69
End: 2024-09-08

## 2024-09-09 ENCOUNTER — APPOINTMENT (OUTPATIENT)
Dept: ENDOCRINOLOGY | Facility: CLINIC | Age: 69
End: 2024-09-09
Payer: MEDICARE

## 2024-09-09 PROCEDURE — 99215 OFFICE O/P EST HI 40 MIN: CPT

## 2024-09-09 RX ORDER — ISOPROPYL ALCOHOL 0.7 ML/ML
SWAB TOPICAL
Qty: 4 | Refills: 0 | Status: ACTIVE | COMMUNITY
Start: 2024-09-09 | End: 1900-01-01

## 2024-09-09 NOTE — ASSESSMENT
[FreeTextEntry1] : T2Dm uncontrolled   Pt will have RDCDE teach on Wednesday for Insulin adn Oly 3 teach   pt colby has kept insulin out since gettign it last week- essentially allpens now will be  in 30 days   dw pt - storage- she put in fridge   dw pt can leave the pen in use out but ohters ave to stay infridge    dw pt importance of using steroile alcohl pads for pen use and cleaning skin  for injeciton and oly   increase Amaryl to 1 mg in AM and 2 mg in PM   check BS bid ac  can check 2 hr pp  goal <180 mg/dL fasting BS goal 100-1440   Dw pt signs symptoms of hypoglycemia   dw pt importance of zero carb diet xiomara with use of jardiance  to avoid eDKA  dw pt side effect  UTI yeast infeciton  etc   will set up TEB in 2 weeks   pt ok with coming to [place] office for inperson f/u    Thyroid nodule-- for sono in 2024  s/p FNA x2 benign nodules Inc LFT - will see GI for followup   balance issues--willsee Neurology as well as cont f/u with POdiatry   CV  CAD post stent to see cariology

## 2024-09-09 NOTE — HISTORY OF PRESENT ILLNESS
[Home] : at home, [unfilled] , at the time of the visit. [Other Location: e.g. Home (Enter Location, City,State)___] : at [unfilled] [Verbal consent obtained from patient] : the patient, [unfilled] [FreeTextEntry1] :   TEB start time 1005 MA  end time 1111 AM   pt just dx with COvid this AM- not given any antivirals   has mild cough  no fevers now  was having  pt being seen for uncontrolled  T2DM Pt with h/o T2Dm x 5-6 years   pt was on trulicity  until spring of this year when changed insurnace plans  and was not covered  A1c went to 12 .2 (from 6.6 % )   Current Regimen : Amaryl 1 mg bid   Jardiance 25 mg qd ( just started a few weeks ago) Basaglar 10 units qhs --- did not start - not sure how to use it   picked it up from pharmacy middle of last week- but did not put in refrigerator      Insulin:  BAsaglar 10 units to start but did not yet -needs teaching -    Sensor picked up Oly 3 but it is not sure how to use --- will read instrucitons for appt on WEdnesday informed t not to use morethan  500 mg Vit C( not sure which Oly 3 she has)    Meter   contour Next    Checks  BS   1xtimes per day    Hypoglycemia : no low BS or symptoms    Diet  B  eggwhite omelette protein drink  wtih almond milk   L  yogurt blueberries  D salad Grilled chicken  vegetables  2 x per week Pepsi zero  no sweetened beverages   flavored seltzers    Exercise not able to excrecise lately - in   had ? SAH Lumbar spine  furhter eval negative but pot does have sciatica  soem trouble with balance- seeing podiatry for vitmin nijecitons into her foot     Follow ups   opthalmology no   goes regiularly   Cardiology  seeing  has CAD s/p stent RCA was 90% blocked   was on ASA and plavix x 1 month   now  onASA    Nephrology  NO nephropathy   Podiatry sees regualrly for foot care and vitamin injctions into foot    Intercurrent illness/medical problems  now with COvid   GI  no H/o pancreatitis or MTC  but has inc LFT  pt drinks 1 galss wine per night  2 on weekend    per chart stated galstones but pt never told of this  never had Gi issues with trulicity   PMH  Thyroid nodule- s/p FNA x 2  benoign   was seeing Dr Bird but now only doign yearly sonos- to go back if changes HTN   CAD post stent  NO MI per pt   meningioma  Erosive gastritits   Inc LFT  Osteoporosis- to get DEXA this year nomeds shoulder impingemnt- has revceived steroid injeciton in the past   PSH catarct  LASIK  HIP repalcement  PArtoid tumor removal   Soc Hx   former smoker quit in   Alcohol use-0 1 drink of wine daily  2 glasses on weekend [place]   no chem no XRT   FH  sister DM Mom  infected GB   Breast CA  Sister overavtive thyroid  OVarian tumor

## 2024-09-11 ENCOUNTER — RX RENEWAL (OUTPATIENT)
Age: 69
End: 2024-09-11

## 2024-09-11 ENCOUNTER — APPOINTMENT (OUTPATIENT)
Dept: ENDOCRINOLOGY | Facility: CLINIC | Age: 69
End: 2024-09-11

## 2024-09-11 DIAGNOSIS — E11.65 TYPE 2 DIABETES MELLITUS WITH HYPERGLYCEMIA: ICD-10-CM

## 2024-09-11 PROCEDURE — 97802 MEDICAL NUTRITION INDIV IN: CPT | Mod: 2W

## 2024-09-24 ENCOUNTER — APPOINTMENT (OUTPATIENT)
Dept: ENDOCRINOLOGY | Facility: CLINIC | Age: 69
End: 2024-09-24
Payer: MEDICARE

## 2024-09-24 PROCEDURE — 99214 OFFICE O/P EST MOD 30 MIN: CPT

## 2024-09-29 NOTE — HISTORY OF PRESENT ILLNESS
[Home] : at home, [unfilled] , at the time of the visit. [Other Location: e.g. Home (Enter Location, City,State)___] : at [unfilled] [Verbal consent obtained from patient] : the patient, [unfilled] [FreeTextEntry1] :  TEB start time  400 pM  end time  413 pm  follow up T2DM  pt reports to be feelign a little better    FBS this      A1c went to 12 .2 (from 6.6 % )   Current Regimen : Amaryl 1 mg in Am and 2 mg in PM   Jardiance 25 mg qd ( just started a few weeks ago) Basaglar 10 units qhs ---       Sensor using Oly 3  Oly reviewed  Veruy hgh 68 %  High 32%  Target 0%  low 0% Very low )% GMI 9.9%  Avg glucose 277  variability 17.5%    Meter   contour Next    Checks  BS   1xtimes per day  but  shut Oly apalrms off bc  going too high    Hypoglycemia : no low BS or symptoms    Diet  B  eggwhite omelette protein drink  wtih almond milk   L  yogurt blueberries  D salad Grilled chicken  vegetables  2 x per week Pepsi zero  no sweetened beverages   flavored seltzers    Exercise not able to excrecise lately - in   had ? SAH Lumbar spine  furhter eval negative but pot does have sciatica  soem trouble with balance- seeing podiatry for vitmin nitashcitons into her foot     Follow ups   opthalmology no   goes regiularly   Cardiology  seeing  has CAD s/p stent RCA was 90% blocked   was on ASA and plavix x 1 month   now  onASA    Nephrology  NO nephropathy   Podiatry sees regualrly for foot care and vitamin injctions into foot    Intercurrent illness/medical problems  now with COvid   GI  no H/o pancreatitis or MTC  but has inc LFT  pt drinks 1 galss wine per night  2 on weekend    per chart stated galstones but pt never told of this  never had Gi issues with trulicity   PMH  Thyroid nodule- s/p FNA x 2  benoign   was seeing Dr Bird but now only doign yearly sonos- to go back if changes HTN   CAD post stent  NO MI per pt   meningioma  Erosive gastritits   Inc LFT  Osteoporosis- to get DEXA this year nomeds shoulder impingemnt- has revceived steroid injeciton in the past   PSH catarct  LASIK  HIP repalcement  PArtoid tumor removal   Soc Hx   former smoker quit in   Alcohol use-0 1 drink of wine daily  2 glasses on weekend [place]   no chem no XRT   FH  sister DM Mom  infected GB   Breast CA  Sister overavtive thyroid  OVarian tumor

## 2024-09-29 NOTE — ASSESSMENT
[FreeTextEntry1] : T2Dm uncontrolled  Oly reviewd - still running high-- will increase Basaglar to 15 units  inc Amaryl to 2mg bid - change to 2mg tab cont jardiance  hopefully with increase Oly will alram less for high BS   dw pt - storage- she put in fridge   dw pt can leave the pen in use out but ohters ave to stay infridge     Dw pt signs symptoms of hypoglycemia   dw pt importance of zero carb diet xiomara with use of jardiance  to avoid eDKA  dw pt side effect  UTI yeast infeciton  etc      Thyroid nodule-- for sono in fally 2024  s/p FNA x2 benign nodules  Inc LFT - will see GI for followup   balance issues--willsee Neurology as well as cont f/u with POdiatry   CV  CAD post stent to see cariology

## 2024-10-01 NOTE — H&P PST ADULT - NS PRO TALK SOMEONE YN
HPI   Chief Complaint   Patient presents with    Rash     Mom noticed an bump on forehead when she picked her up from school. It kept getting bigger.        Patient is a 4-year-old female with a complex past medical history who is presenting with a rash on her forehead.  Mom states that this is the fourth time this has happened.  She gets a small red bump that continues to expand throughout the day.  Mom states that it was just small and slightly raised this morning and has a continued to get a little bit more swollen today today.  Mom has not put anything on the rash at all.  Patient has not had any associated fevers.  Patient is otherwise acting appropriate.  She has not been complaining of anything.  Patient has otherwise been eating and drinking well with no issues.              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[facility])     Eczema     Epilepsy     GERD (gastroesophageal reflux disease)     infancy; never on medications, now resolved    History of recurrent ear infection     Iron deficiency anemia     Neutropenia     ANDRES (obstructive sleep apnea)     Seizure disorder ([facility])     Sleep apnea     Speech delay     Syncope      Past Surgical History:   Procedure Laterality Date    ADENOIDECTOMY Bilateral 01/2024    CARDIAC ELECTROPHYSIOLOGY PROCEDURE N/A 7/2/2024    Procedure: Pediatric Loop Recorder Implant;  Surgeon: Ankit Kim MD;  Location: [facility] Cardiac Cath Lab;  Service: Electrophysiology;  Laterality: N/A;    TONSILLECTOMY Bilateral 01/2024     Family History   Problem Relation Name Age of Onset    Anemia Mother      Drug abuse Father      Hypertension Father      Seizures Father          illicet drug induced    Pulmonary embolism Father      Glaucoma Maternal Grandmother      Diabetes Maternal Grandmother       Hypertension Maternal Grandmother      Cancer Maternal Grandmother      Seizures Maternal Grandmother      Alcohol abuse Maternal Grandfather      Other (Other) Maternal Grandfather          sepsis    Cancer Paternal Grandfather       Social History     Tobacco Use    Smoking status: Never     Passive exposure: Never    Smokeless tobacco: Never   Vaping Use    Vaping status: Never Used   Substance Use Topics    Alcohol use: Not on file    Drug use: Not on file       Physical Exam   ED Triage Vitals [10/01/24 1848]   Temp Heart Rate Resp BP   36.7 °C (98 °F) 108 22 107/76      SpO2 Temp Source Heart Rate Source Patient Position   99 % Axillary Apical Sitting      BP Location FiO2 (%)     Right arm --       Physical Exam  Constitutional:       General: She is active. She is not in acute distress.  HENT:      Head: Normocephalic. Swelling (small, round, erythematous lesion on the L forhead that is firm; mild induration. Small pustule in the middle of the bump) present.      Right Ear: External ear normal.      Left Ear: External ear normal.      Nose: No congestion.      Mouth/Throat:      Mouth: Mucous membranes are moist.      Pharynx: No posterior oropharyngeal erythema.   Eyes:      Extraocular Movements: Extraocular movements intact.      Conjunctiva/sclera: Conjunctivae normal.   Cardiovascular:      Rate and Rhythm: Normal rate and regular rhythm.      Heart sounds: No murmur heard.  Pulmonary:      Effort: Pulmonary effort is normal. No respiratory distress.      Breath sounds: Normal breath sounds.   Abdominal:      General: Abdomen is flat. There is no distension.      Palpations: Abdomen is soft.   Musculoskeletal:         General: No swelling or tenderness. Normal range of motion.   Skin:     General: Skin is warm and dry.      Capillary Refill: Capillary refill takes less than 2 seconds.      Findings: No rash.   Neurological:      General: No focal deficit present.      Mental Status: She is alert.            ED Course & MDM   Diagnoses as of 10/01/24 2301   Rash   Bug bite of face without infection                 No data recorded     Rives Coma Scale Score: 15 (10/01/24 1920 : Piper Curtis RN)                           Medical Decision Making  Patient is a 4-year-old female with a complex past medical history who is presenting with a rash on her forehead.  On presentation, the patient is hemodynamically stable with age-appropriate vital signs.  On exam, the patient has a small pustule on the left forehead with surrounding erythema and edema.  It appears almost like a bug bite that has gotten inflamed and irritated, especially given the pustule in the middle.  Patient has not been irritated by this and has otherwise been acting appropriate.  Recommended ice or warm compresses as needed to help with some comfort, but otherwise no additional recommendations are made at this time.  The patient has had a history of this occurring about 4 separate times and they have all gone away on their own, making it more likely that this is a transient reaction, like a bug bite.  Recommended close follow-up with the pediatrician if this is continuing to happen.  Patient was discharged home in stable condition.      Elizabeth Fernandez DO  Pediatric Emergency Medicine Fellow, PGY6       Elizabeth Fernandez DO  Resident  10/01/24 2000       Elizabeth Fernandez DO  Resident  10/01/24 2301     no

## 2024-10-10 ENCOUNTER — NON-APPOINTMENT (OUTPATIENT)
Age: 69
End: 2024-10-10

## 2024-10-10 ENCOUNTER — APPOINTMENT (OUTPATIENT)
Dept: CARDIOLOGY | Facility: CLINIC | Age: 69
End: 2024-10-10
Payer: MEDICARE

## 2024-10-10 VITALS
HEIGHT: 68 IN | BODY MASS INDEX: 44.41 KG/M2 | SYSTOLIC BLOOD PRESSURE: 153 MMHG | WEIGHT: 293 LBS | OXYGEN SATURATION: 97 % | DIASTOLIC BLOOD PRESSURE: 82 MMHG | HEART RATE: 66 BPM

## 2024-10-10 DIAGNOSIS — E78.5 HYPERLIPIDEMIA, UNSPECIFIED: ICD-10-CM

## 2024-10-10 DIAGNOSIS — I25.10 ATHEROSCLEROTIC HEART DISEASE OF NATIVE CORONARY ARTERY W/OUT ANGINA PECTORIS: ICD-10-CM

## 2024-10-10 DIAGNOSIS — I10 ESSENTIAL (PRIMARY) HYPERTENSION: ICD-10-CM

## 2024-10-10 PROCEDURE — G2211 COMPLEX E/M VISIT ADD ON: CPT

## 2024-10-10 PROCEDURE — 99215 OFFICE O/P EST HI 40 MIN: CPT

## 2024-10-15 ENCOUNTER — APPOINTMENT (OUTPATIENT)
Dept: ORTHOPEDIC SURGERY | Facility: CLINIC | Age: 69
End: 2024-10-15
Payer: MEDICARE

## 2024-10-15 VITALS — HEIGHT: 68 IN | BODY MASS INDEX: 27.28 KG/M2 | WEIGHT: 180 LBS

## 2024-10-15 DIAGNOSIS — M75.52 BURSITIS OF LEFT SHOULDER: ICD-10-CM

## 2024-10-15 DIAGNOSIS — M19.019 PRIMARY OSTEOARTHRITIS, UNSPECIFIED SHOULDER: ICD-10-CM

## 2024-10-15 PROCEDURE — 73030 X-RAY EXAM OF SHOULDER: CPT | Mod: LT

## 2024-10-15 PROCEDURE — 99203 OFFICE O/P NEW LOW 30 MIN: CPT | Mod: 25

## 2024-10-15 PROCEDURE — 99213 OFFICE O/P EST LOW 20 MIN: CPT | Mod: 25

## 2024-10-15 PROCEDURE — 20610 DRAIN/INJ JOINT/BURSA W/O US: CPT | Mod: LT

## 2024-10-15 PROCEDURE — J3490M: CUSTOM | Mod: NC

## 2024-10-15 NOTE — ED PROVIDER NOTE - TOBACCO USE
Unknown if ever smoked
Above energy needs calculated for wt maintenance (20-25kcal/kg ideal body weight).  Weeks 1-2 estimated needs: 500-600kcal/day (10-12kcal/kg ideal body weight), 60-75g protein/day (1.2-1.5g/kg ideal body weight), >/=64oz clear fluids.

## 2024-10-25 ENCOUNTER — RX RENEWAL (OUTPATIENT)
Age: 69
End: 2024-10-25

## 2024-10-28 ENCOUNTER — RX RENEWAL (OUTPATIENT)
Age: 69
End: 2024-10-28

## 2024-10-29 ENCOUNTER — APPOINTMENT (OUTPATIENT)
Dept: ORTHOPEDIC SURGERY | Facility: CLINIC | Age: 69
End: 2024-10-29
Payer: MEDICARE

## 2024-10-29 VITALS — HEIGHT: 68 IN | BODY MASS INDEX: 27.28 KG/M2 | WEIGHT: 180 LBS

## 2024-10-29 DIAGNOSIS — M19.012 PRIMARY OSTEOARTHRITIS, LEFT SHOULDER: ICD-10-CM

## 2024-10-29 PROCEDURE — 99214 OFFICE O/P EST MOD 30 MIN: CPT

## 2024-10-29 RX ORDER — MELOXICAM 15 MG/1
15 TABLET ORAL
Qty: 30 | Refills: 2 | Status: ACTIVE | COMMUNITY
Start: 2024-10-29 | End: 2025-07-26

## 2024-10-31 ENCOUNTER — APPOINTMENT (OUTPATIENT)
Dept: ENDOCRINOLOGY | Facility: CLINIC | Age: 69
End: 2024-10-31
Payer: MEDICARE

## 2024-10-31 PROCEDURE — 99214 OFFICE O/P EST MOD 30 MIN: CPT

## 2024-11-20 ENCOUNTER — APPOINTMENT (OUTPATIENT)
Dept: ULTRASOUND IMAGING | Facility: CLINIC | Age: 69
End: 2024-11-20
Payer: MEDICARE

## 2024-11-20 ENCOUNTER — APPOINTMENT (OUTPATIENT)
Dept: MAMMOGRAPHY | Facility: CLINIC | Age: 69
End: 2024-11-20
Payer: MEDICARE

## 2024-11-20 ENCOUNTER — NON-APPOINTMENT (OUTPATIENT)
Age: 69
End: 2024-11-20

## 2024-11-20 ENCOUNTER — RESULT REVIEW (OUTPATIENT)
Age: 69
End: 2024-11-20

## 2024-11-20 PROCEDURE — 76641 ULTRASOUND BREAST COMPLETE: CPT | Mod: 50,GA

## 2024-11-20 PROCEDURE — 77063 BREAST TOMOSYNTHESIS BI: CPT

## 2024-11-20 PROCEDURE — 76536 US EXAM OF HEAD AND NECK: CPT

## 2024-11-20 PROCEDURE — 77067 SCR MAMMO BI INCL CAD: CPT

## 2024-11-21 ENCOUNTER — TRANSCRIPTION ENCOUNTER (OUTPATIENT)
Age: 69
End: 2024-11-21

## 2024-11-22 ENCOUNTER — APPOINTMENT (OUTPATIENT)
Dept: ORTHOPEDIC SURGERY | Facility: CLINIC | Age: 69
End: 2024-11-22

## 2024-11-22 VITALS — BODY MASS INDEX: 27.28 KG/M2 | WEIGHT: 180 LBS | HEIGHT: 68 IN

## 2024-11-22 DIAGNOSIS — M18.11 UNILATERAL PRIMARY OSTEOARTHRITIS OF FIRST CARPOMETACARPAL JOINT, RIGHT HAND: ICD-10-CM

## 2024-11-22 PROCEDURE — 99203 OFFICE O/P NEW LOW 30 MIN: CPT | Mod: 25

## 2024-11-22 PROCEDURE — 20605 DRAIN/INJ JOINT/BURSA W/O US: CPT | Mod: RT

## 2024-11-22 PROCEDURE — 73110 X-RAY EXAM OF WRIST: CPT | Mod: RT

## 2024-11-29 ENCOUNTER — NON-APPOINTMENT (OUTPATIENT)
Age: 69
End: 2024-11-29

## 2024-12-02 ENCOUNTER — TRANSCRIPTION ENCOUNTER (OUTPATIENT)
Age: 69
End: 2024-12-02

## 2024-12-02 ENCOUNTER — NON-APPOINTMENT (OUTPATIENT)
Age: 69
End: 2024-12-02

## 2024-12-02 VITALS — WEIGHT: 180 LBS | BODY MASS INDEX: 27.28 KG/M2 | HEIGHT: 68 IN

## 2024-12-02 DIAGNOSIS — Z87.891 PERSONAL HISTORY OF NICOTINE DEPENDENCE: ICD-10-CM

## 2024-12-10 ENCOUNTER — APPOINTMENT (OUTPATIENT)
Dept: CT IMAGING | Facility: CLINIC | Age: 69
End: 2024-12-10
Payer: MEDICARE

## 2024-12-10 ENCOUNTER — APPOINTMENT (OUTPATIENT)
Dept: RADIOLOGY | Facility: CLINIC | Age: 69
End: 2024-12-10

## 2024-12-10 PROCEDURE — 71271 CT THORAX LUNG CANCER SCR C-: CPT

## 2024-12-10 PROCEDURE — 77085 DXA BONE DENSITY AXL VRT FX: CPT

## 2024-12-11 ENCOUNTER — TRANSCRIPTION ENCOUNTER (OUTPATIENT)
Age: 69
End: 2024-12-11

## 2024-12-17 ENCOUNTER — RX RENEWAL (OUTPATIENT)
Age: 69
End: 2024-12-17

## 2024-12-17 ENCOUNTER — NON-APPOINTMENT (OUTPATIENT)
Age: 69
End: 2024-12-17

## 2024-12-17 RX ORDER — GLIMEPIRIDE 1 MG/1
1 TABLET ORAL
Qty: 180 | Refills: 0 | Status: ACTIVE | COMMUNITY
Start: 2024-12-17 | End: 1900-01-01

## 2024-12-19 ENCOUNTER — APPOINTMENT (OUTPATIENT)
Dept: ORTHOPEDIC SURGERY | Facility: CLINIC | Age: 69
End: 2024-12-19

## 2024-12-20 ENCOUNTER — APPOINTMENT (OUTPATIENT)
Dept: ORTHOPEDIC SURGERY | Facility: CLINIC | Age: 69
End: 2024-12-20
Payer: MEDICARE

## 2024-12-20 ENCOUNTER — APPOINTMENT (OUTPATIENT)
Dept: INTERNAL MEDICINE | Facility: CLINIC | Age: 69
End: 2024-12-20
Payer: MEDICARE

## 2024-12-20 VITALS — HEIGHT: 68 IN | BODY MASS INDEX: 27.89 KG/M2 | WEIGHT: 184 LBS

## 2024-12-20 VITALS
RESPIRATION RATE: 16 BRPM | HEIGHT: 68 IN | BODY MASS INDEX: 27.89 KG/M2 | OXYGEN SATURATION: 98 % | TEMPERATURE: 97.4 F | DIASTOLIC BLOOD PRESSURE: 82 MMHG | SYSTOLIC BLOOD PRESSURE: 126 MMHG | WEIGHT: 184 LBS | HEART RATE: 66 BPM

## 2024-12-20 DIAGNOSIS — R74.8 ABNORMAL LEVELS OF OTHER SERUM ENZYMES: ICD-10-CM

## 2024-12-20 DIAGNOSIS — Z95.5 PRESENCE OF CORONARY ANGIOPLASTY IMPLANT AND GRAFT: ICD-10-CM

## 2024-12-20 DIAGNOSIS — H40.9 UNSPECIFIED GLAUCOMA: ICD-10-CM

## 2024-12-20 DIAGNOSIS — T78.40XA ALLERGY, UNSPECIFIED, INITIAL ENCOUNTER: ICD-10-CM

## 2024-12-20 DIAGNOSIS — M81.0 AGE-RELATED OSTEOPOROSIS W/OUT CURRENT PATHOLOGICAL FRACTURE: ICD-10-CM

## 2024-12-20 DIAGNOSIS — I10 ESSENTIAL (PRIMARY) HYPERTENSION: ICD-10-CM

## 2024-12-20 DIAGNOSIS — M18.11 UNILATERAL PRIMARY OSTEOARTHRITIS OF FIRST CARPOMETACARPAL JOINT, RIGHT HAND: ICD-10-CM

## 2024-12-20 DIAGNOSIS — E11.43 TYPE 2 DIABETES MELLITUS WITH DIABETIC AUTONOMIC (POLY)NEUROPATHY: ICD-10-CM

## 2024-12-20 DIAGNOSIS — R80.9 PROTEINURIA, UNSPECIFIED: ICD-10-CM

## 2024-12-20 DIAGNOSIS — F32.A DEPRESSION, UNSPECIFIED: ICD-10-CM

## 2024-12-20 DIAGNOSIS — E11.65 TYPE 2 DIABETES MELLITUS WITH HYPERGLYCEMIA: ICD-10-CM

## 2024-12-20 DIAGNOSIS — J98.4 OTHER DISORDERS OF LUNG: ICD-10-CM

## 2024-12-20 DIAGNOSIS — K21.9 GASTRO-ESOPHAGEAL REFLUX DISEASE W/OUT ESOPHAGITIS: ICD-10-CM

## 2024-12-20 DIAGNOSIS — R11.0 NAUSEA: ICD-10-CM

## 2024-12-20 DIAGNOSIS — I21.01: ICD-10-CM

## 2024-12-20 DIAGNOSIS — D32.9 BENIGN NEOPLASM OF MENINGES, UNSPECIFIED: ICD-10-CM

## 2024-12-20 DIAGNOSIS — E78.5 HYPERLIPIDEMIA, UNSPECIFIED: ICD-10-CM

## 2024-12-20 DIAGNOSIS — I25.10 ATHEROSCLEROTIC HEART DISEASE OF NATIVE CORONARY ARTERY W/OUT ANGINA PECTORIS: ICD-10-CM

## 2024-12-20 DIAGNOSIS — R73.01 IMPAIRED FASTING GLUCOSE: ICD-10-CM

## 2024-12-20 DIAGNOSIS — E04.1 NONTOXIC SINGLE THYROID NODULE: ICD-10-CM

## 2024-12-20 DIAGNOSIS — Z92.89 PERSONAL HISTORY OF OTHER MEDICAL TREATMENT: ICD-10-CM

## 2024-12-20 DIAGNOSIS — R79.89 OTHER SPECIFIED ABNORMAL FINDINGS OF BLOOD CHEMISTRY: ICD-10-CM

## 2024-12-20 PROCEDURE — G2211 COMPLEX E/M VISIT ADD ON: CPT

## 2024-12-20 PROCEDURE — 99214 OFFICE O/P EST MOD 30 MIN: CPT

## 2024-12-20 PROCEDURE — 99213 OFFICE O/P EST LOW 20 MIN: CPT

## 2024-12-20 PROCEDURE — 36415 COLL VENOUS BLD VENIPUNCTURE: CPT

## 2024-12-24 ENCOUNTER — TRANSCRIPTION ENCOUNTER (OUTPATIENT)
Age: 69
End: 2024-12-24

## 2024-12-24 LAB
25(OH)D3 SERPL-MCNC: 45.5 NG/ML
ALBUMIN SERPL ELPH-MCNC: 4.2 G/DL
ALP BLD-CCNC: 151 U/L
ALT SERPL-CCNC: 70 U/L
ANION GAP SERPL CALC-SCNC: 15 MMOL/L
APPEARANCE: CLEAR
AST SERPL-CCNC: 38 U/L
BASOPHILS # BLD AUTO: 0.05 K/UL
BASOPHILS NFR BLD AUTO: 0.7 %
BILIRUB SERPL-MCNC: 0.4 MG/DL
BILIRUBIN URINE: NEGATIVE
BLOOD URINE: NEGATIVE
BUN SERPL-MCNC: 32 MG/DL
CALCIUM SERPL-MCNC: 9.5 MG/DL
CHLORIDE SERPL-SCNC: 102 MMOL/L
CHOLEST SERPL-MCNC: 197 MG/DL
CO2 SERPL-SCNC: 21 MMOL/L
COLOR: YELLOW
CREAT SERPL-MCNC: 0.71 MG/DL
EGFR: 93 ML/MIN/1.73M2
EOSINOPHIL # BLD AUTO: 0.23 K/UL
EOSINOPHIL NFR BLD AUTO: 3.4 %
ESTIMATED AVERAGE GLUCOSE: 200 MG/DL
GLUCOSE QUALITATIVE U: >=1000 MG/DL
GLUCOSE SERPL-MCNC: 168 MG/DL
HBA1C MFR BLD HPLC: 8.6 %
HCT VFR BLD CALC: 42.8 %
HDLC SERPL-MCNC: 61 MG/DL
HGB BLD-MCNC: 14.2 G/DL
IMM GRANULOCYTES NFR BLD AUTO: 0.4 %
KETONES URINE: NEGATIVE MG/DL
LDLC SERPL CALC-MCNC: 109 MG/DL
LEUKOCYTE ESTERASE URINE: NEGATIVE
LYMPHOCYTES # BLD AUTO: 1.61 K/UL
LYMPHOCYTES NFR BLD AUTO: 23.7 %
MAN DIFF?: NORMAL
MCHC RBC-ENTMCNC: 30.7 PG
MCHC RBC-ENTMCNC: 33.2 G/DL
MCV RBC AUTO: 92.6 FL
MONOCYTES # BLD AUTO: 0.87 K/UL
MONOCYTES NFR BLD AUTO: 12.8 %
NEUTROPHILS # BLD AUTO: 4 K/UL
NEUTROPHILS NFR BLD AUTO: 59 %
NITRITE URINE: NEGATIVE
NONHDLC SERPL-MCNC: 136 MG/DL
PH URINE: 6
PLATELET # BLD AUTO: 308 K/UL
POTASSIUM SERPL-SCNC: 4.7 MMOL/L
PROT SERPL-MCNC: 7.1 G/DL
PROTEIN URINE: NORMAL MG/DL
RBC # BLD: 4.62 M/UL
RBC # FLD: 12 %
SODIUM SERPL-SCNC: 137 MMOL/L
SPECIFIC GRAVITY URINE: 1.02
TRIGL SERPL-MCNC: 152 MG/DL
UROBILINOGEN URINE: 0.2 MG/DL
WBC # FLD AUTO: 6.79 K/UL

## 2024-12-31 ENCOUNTER — NON-APPOINTMENT (OUTPATIENT)
Age: 69
End: 2024-12-31

## 2025-01-02 RX ORDER — INSULIN DEGLUDEC 100 U/ML
100 INJECTION, SOLUTION SUBCUTANEOUS
Qty: 1 | Refills: 1 | Status: ACTIVE | COMMUNITY
Start: 2025-01-02 | End: 1900-01-01

## 2025-01-06 NOTE — ASU PATIENT PROFILE, ADULT - FALL HARM RISK TYPE OF ASSESSMENT
Push fluids  Fever control discussed  Pain control with analgesics  Humidifier at bedside  Ok to give age appropriate OTC c/c medication   Monitor for worsening symptoms and RTC prn    
Admission

## 2025-01-14 ENCOUNTER — APPOINTMENT (OUTPATIENT)
Dept: ENDOCRINOLOGY | Facility: CLINIC | Age: 70
End: 2025-01-14
Payer: MEDICARE

## 2025-01-14 DIAGNOSIS — R80.9 PROTEINURIA, UNSPECIFIED: ICD-10-CM

## 2025-01-14 DIAGNOSIS — R79.89 OTHER SPECIFIED ABNORMAL FINDINGS OF BLOOD CHEMISTRY: ICD-10-CM

## 2025-01-14 DIAGNOSIS — E78.5 HYPERLIPIDEMIA, UNSPECIFIED: ICD-10-CM

## 2025-01-14 DIAGNOSIS — I10 ESSENTIAL (PRIMARY) HYPERTENSION: ICD-10-CM

## 2025-01-14 DIAGNOSIS — E11.65 TYPE 2 DIABETES MELLITUS WITH HYPERGLYCEMIA: ICD-10-CM

## 2025-01-14 DIAGNOSIS — E04.1 NONTOXIC SINGLE THYROID NODULE: ICD-10-CM

## 2025-01-14 PROCEDURE — 99214 OFFICE O/P EST MOD 30 MIN: CPT | Mod: 2W

## 2025-01-14 RX ORDER — DEXTROSE 3.75 G
4 TABLET,CHEWABLE ORAL
Qty: 1 | Refills: 3 | Status: ACTIVE | COMMUNITY
Start: 2025-01-14 | End: 1900-01-01

## 2025-01-22 ENCOUNTER — OFFICE (OUTPATIENT)
Facility: LOCATION | Age: 70
Setting detail: OPHTHALMOLOGY
End: 2025-01-22
Payer: MEDICARE

## 2025-01-22 DIAGNOSIS — H40.1124: ICD-10-CM

## 2025-01-22 DIAGNOSIS — H40.1111: ICD-10-CM

## 2025-01-22 DIAGNOSIS — H16.223: ICD-10-CM

## 2025-01-22 PROCEDURE — 99213 OFFICE O/P EST LOW 20 MIN: CPT | Performed by: OPHTHALMOLOGY

## 2025-01-22 ASSESSMENT — LID POSITION - PTOSIS
OS_PTOSIS: ABSENT
OD_PTOSIS: ABSENT

## 2025-01-22 ASSESSMENT — CONFRONTATIONAL VISUAL FIELD TEST (CVF)
OD_FINDINGS: FULL
OS_FINDINGS: FULL

## 2025-01-22 ASSESSMENT — REFRACTION_CURRENTRX
OD_OVR_VA: 20/
OD_SPHERE: +2.00
OS_SPHERE: +2.00
OS_OVR_VA: 20/

## 2025-01-22 ASSESSMENT — KERATOMETRY
OD_K1POWER_DIOPTERS: 44.25
OD_AXISANGLE_DEGREES: 144
OD_K2POWER_DIOPTERS: 44.75
OS_AXISANGLE_DEGREES: 109
OS_K1POWER_DIOPTERS: 43.75
OS_K2POWER_DIOPTERS: 44.00

## 2025-01-22 ASSESSMENT — SUPERFICIAL PUNCTATE KERATITIS (SPK)
OS_SPK: 2+
OD_SPK: 2+

## 2025-01-22 ASSESSMENT — PACHYMETRY
OS_CT_CORRECTION: -3
OD_CT_UM: 607
OD_CT_CORRECTION: -4
OS_CT_UM: 584

## 2025-01-22 ASSESSMENT — REFRACTION_AUTOREFRACTION
OS_SPHERE: PLANO
OS_AXIS: 088
OD_AXIS: 077
OD_CYLINDER: -1.00
OD_SPHERE: +0.75

## 2025-01-22 ASSESSMENT — TONOMETRY
OS_IOP_MMHG: 15
OD_IOP_MMHG: 16

## 2025-01-22 ASSESSMENT — VISUAL ACUITY
OD_BCVA: 20/25
OS_BCVA: 20/60-2

## 2025-01-24 ENCOUNTER — APPOINTMENT (OUTPATIENT)
Dept: ORTHOPEDIC SURGERY | Facility: CLINIC | Age: 70
End: 2025-01-24
Payer: MEDICARE

## 2025-01-24 VITALS — HEIGHT: 68 IN | WEIGHT: 184 LBS | BODY MASS INDEX: 27.89 KG/M2

## 2025-01-24 DIAGNOSIS — M18.11 UNILATERAL PRIMARY OSTEOARTHRITIS OF FIRST CARPOMETACARPAL JOINT, RIGHT HAND: ICD-10-CM

## 2025-01-24 PROCEDURE — 20605 DRAIN/INJ JOINT/BURSA W/O US: CPT | Mod: RT

## 2025-01-24 PROCEDURE — 99212 OFFICE O/P EST SF 10 MIN: CPT | Mod: 25

## 2025-01-27 ENCOUNTER — RX RENEWAL (OUTPATIENT)
Age: 70
End: 2025-01-27

## 2025-01-27 ENCOUNTER — NON-APPOINTMENT (OUTPATIENT)
Age: 70
End: 2025-01-27

## 2025-02-21 ENCOUNTER — APPOINTMENT (OUTPATIENT)
Dept: ORTHOPEDIC SURGERY | Facility: CLINIC | Age: 70
End: 2025-02-21

## 2025-02-22 ENCOUNTER — RX RENEWAL (OUTPATIENT)
Age: 70
End: 2025-02-22

## 2025-02-24 ENCOUNTER — RX RENEWAL (OUTPATIENT)
Age: 70
End: 2025-02-24

## 2025-02-25 ENCOUNTER — RX RENEWAL (OUTPATIENT)
Age: 70
End: 2025-02-25

## 2025-02-26 ENCOUNTER — RX RENEWAL (OUTPATIENT)
Age: 70
End: 2025-02-26

## 2025-03-11 ENCOUNTER — APPOINTMENT (OUTPATIENT)
Dept: ENDOCRINOLOGY | Facility: CLINIC | Age: 70
End: 2025-03-11
Payer: MEDICARE

## 2025-03-11 ENCOUNTER — RX RENEWAL (OUTPATIENT)
Age: 70
End: 2025-03-11

## 2025-03-11 DIAGNOSIS — E11.65 TYPE 2 DIABETES MELLITUS WITH HYPERGLYCEMIA: ICD-10-CM

## 2025-03-11 DIAGNOSIS — E04.1 NONTOXIC SINGLE THYROID NODULE: ICD-10-CM

## 2025-03-11 DIAGNOSIS — E11.9 TYPE 2 DIABETES MELLITUS W/OUT COMPLICATIONS: ICD-10-CM

## 2025-03-11 PROCEDURE — G2211 COMPLEX E/M VISIT ADD ON: CPT | Mod: 2W

## 2025-03-11 PROCEDURE — 99214 OFFICE O/P EST MOD 30 MIN: CPT | Mod: 2W

## 2025-03-11 RX ORDER — 70%ISOPROPYL ALCOHOL 0.7 ML/ML
70 SWAB TOPICAL
Qty: 400 | Refills: 0 | Status: ACTIVE | COMMUNITY
Start: 2025-03-11 | End: 1900-01-01

## 2025-03-11 RX ORDER — DAPAGLIFLOZIN 5 MG/1
5 TABLET, FILM COATED ORAL DAILY
Qty: 30 | Refills: 3 | Status: ACTIVE | COMMUNITY
Start: 2025-03-11 | End: 1900-01-01

## 2025-03-13 ENCOUNTER — APPOINTMENT (OUTPATIENT)
Dept: CARDIOLOGY | Facility: CLINIC | Age: 70
End: 2025-03-13
Payer: MEDICARE

## 2025-03-13 ENCOUNTER — NON-APPOINTMENT (OUTPATIENT)
Age: 70
End: 2025-03-13

## 2025-03-13 VITALS
HEART RATE: 65 BPM | RESPIRATION RATE: 15 BRPM | TEMPERATURE: 98.5 F | WEIGHT: 191.38 LBS | HEIGHT: 68 IN | SYSTOLIC BLOOD PRESSURE: 159 MMHG | OXYGEN SATURATION: 97 % | DIASTOLIC BLOOD PRESSURE: 80 MMHG | BODY MASS INDEX: 29.01 KG/M2

## 2025-03-13 DIAGNOSIS — I10 ESSENTIAL (PRIMARY) HYPERTENSION: ICD-10-CM

## 2025-03-13 DIAGNOSIS — R06.02 SHORTNESS OF BREATH: ICD-10-CM

## 2025-03-13 DIAGNOSIS — I25.10 ATHEROSCLEROTIC HEART DISEASE OF NATIVE CORONARY ARTERY W/OUT ANGINA PECTORIS: ICD-10-CM

## 2025-03-13 PROCEDURE — 93000 ELECTROCARDIOGRAM COMPLETE: CPT

## 2025-03-13 PROCEDURE — 99215 OFFICE O/P EST HI 40 MIN: CPT

## 2025-03-17 ENCOUNTER — RX RENEWAL (OUTPATIENT)
Age: 70
End: 2025-03-17

## 2025-03-18 ENCOUNTER — OUTPATIENT (OUTPATIENT)
Dept: OUTPATIENT SERVICES | Facility: HOSPITAL | Age: 70
LOS: 1 days | End: 2025-03-18
Payer: MEDICARE

## 2025-03-18 ENCOUNTER — NON-APPOINTMENT (OUTPATIENT)
Age: 70
End: 2025-03-18

## 2025-03-18 ENCOUNTER — APPOINTMENT (OUTPATIENT)
Dept: ULTRASOUND IMAGING | Facility: CLINIC | Age: 70
End: 2025-03-18

## 2025-03-18 DIAGNOSIS — D11.0 BENIGN NEOPLASM OF PAROTID GLAND: Chronic | ICD-10-CM

## 2025-03-18 DIAGNOSIS — Z96.642 PRESENCE OF LEFT ARTIFICIAL HIP JOINT: Chronic | ICD-10-CM

## 2025-03-18 DIAGNOSIS — Z98.890 OTHER SPECIFIED POSTPROCEDURAL STATES: Chronic | ICD-10-CM

## 2025-03-18 DIAGNOSIS — Z98.41 CATARACT EXTRACTION STATUS, RIGHT EYE: Chronic | ICD-10-CM

## 2025-03-18 DIAGNOSIS — Z00.8 ENCOUNTER FOR OTHER GENERAL EXAMINATION: ICD-10-CM

## 2025-03-18 PROCEDURE — 93970 EXTREMITY STUDY: CPT

## 2025-03-18 PROCEDURE — 93970 EXTREMITY STUDY: CPT | Mod: 26

## 2025-03-19 ENCOUNTER — RX RENEWAL (OUTPATIENT)
Age: 70
End: 2025-03-19

## 2025-03-21 ENCOUNTER — OUTPATIENT (OUTPATIENT)
Dept: OUTPATIENT SERVICES | Facility: HOSPITAL | Age: 70
LOS: 1 days | End: 2025-03-21
Payer: MEDICARE

## 2025-03-21 ENCOUNTER — APPOINTMENT (OUTPATIENT)
Dept: CV DIAGNOSTICS | Facility: HOSPITAL | Age: 70
End: 2025-03-21

## 2025-03-21 ENCOUNTER — RESULT REVIEW (OUTPATIENT)
Age: 70
End: 2025-03-21

## 2025-03-21 DIAGNOSIS — Z96.641 PRESENCE OF RIGHT ARTIFICIAL HIP JOINT: Chronic | ICD-10-CM

## 2025-03-21 DIAGNOSIS — Z98.890 OTHER SPECIFIED POSTPROCEDURAL STATES: Chronic | ICD-10-CM

## 2025-03-21 DIAGNOSIS — D11.0 BENIGN NEOPLASM OF PAROTID GLAND: Chronic | ICD-10-CM

## 2025-03-21 DIAGNOSIS — Z96.642 PRESENCE OF LEFT ARTIFICIAL HIP JOINT: Chronic | ICD-10-CM

## 2025-03-21 DIAGNOSIS — I25.10 ATHEROSCLEROTIC HEART DISEASE OF NATIVE CORONARY ARTERY WITHOUT ANGINA PECTORIS: ICD-10-CM

## 2025-03-21 DIAGNOSIS — Z98.41 CATARACT EXTRACTION STATUS, RIGHT EYE: Chronic | ICD-10-CM

## 2025-03-21 PROCEDURE — 78452 HT MUSCLE IMAGE SPECT MULT: CPT | Mod: 26

## 2025-03-21 PROCEDURE — 93016 CV STRESS TEST SUPVJ ONLY: CPT

## 2025-03-21 PROCEDURE — 93017 CV STRESS TEST TRACING ONLY: CPT

## 2025-03-21 PROCEDURE — A9500: CPT

## 2025-03-21 PROCEDURE — 78452 HT MUSCLE IMAGE SPECT MULT: CPT | Mod: MC

## 2025-03-21 PROCEDURE — 93018 CV STRESS TEST I&R ONLY: CPT

## 2025-03-27 ENCOUNTER — APPOINTMENT (OUTPATIENT)
Dept: CARDIOLOGY | Facility: CLINIC | Age: 70
End: 2025-03-27

## 2025-04-03 ENCOUNTER — OUTPATIENT (OUTPATIENT)
Dept: OUTPATIENT SERVICES | Facility: HOSPITAL | Age: 70
LOS: 1 days | End: 2025-04-03
Payer: MEDICARE

## 2025-04-03 VITALS
DIASTOLIC BLOOD PRESSURE: 85 MMHG | RESPIRATION RATE: 15 BRPM | HEART RATE: 87 BPM | HEIGHT: 68 IN | WEIGHT: 184.09 LBS | OXYGEN SATURATION: 98 % | SYSTOLIC BLOOD PRESSURE: 146 MMHG

## 2025-04-03 VITALS
OXYGEN SATURATION: 96 % | SYSTOLIC BLOOD PRESSURE: 148 MMHG | RESPIRATION RATE: 17 BRPM | HEART RATE: 80 BPM | DIASTOLIC BLOOD PRESSURE: 63 MMHG

## 2025-04-03 DIAGNOSIS — I25.10 ATHEROSCLEROTIC HEART DISEASE OF NATIVE CORONARY ARTERY WITHOUT ANGINA PECTORIS: ICD-10-CM

## 2025-04-03 DIAGNOSIS — Z98.890 OTHER SPECIFIED POSTPROCEDURAL STATES: Chronic | ICD-10-CM

## 2025-04-03 DIAGNOSIS — Z96.641 PRESENCE OF RIGHT ARTIFICIAL HIP JOINT: Chronic | ICD-10-CM

## 2025-04-03 DIAGNOSIS — Z96.642 PRESENCE OF LEFT ARTIFICIAL HIP JOINT: Chronic | ICD-10-CM

## 2025-04-03 DIAGNOSIS — D11.0 BENIGN NEOPLASM OF PAROTID GLAND: Chronic | ICD-10-CM

## 2025-04-03 DIAGNOSIS — Z98.41 CATARACT EXTRACTION STATUS, RIGHT EYE: Chronic | ICD-10-CM

## 2025-04-03 DIAGNOSIS — Z95.5 PRESENCE OF CORONARY ANGIOPLASTY IMPLANT AND GRAFT: Chronic | ICD-10-CM

## 2025-04-03 LAB
ANION GAP SERPL CALC-SCNC: 16 MMOL/L — SIGNIFICANT CHANGE UP (ref 5–17)
BUN SERPL-MCNC: 26 MG/DL — HIGH (ref 7–23)
CALCIUM SERPL-MCNC: 9.7 MG/DL — SIGNIFICANT CHANGE UP (ref 8.4–10.5)
CHLORIDE SERPL-SCNC: 104 MMOL/L — SIGNIFICANT CHANGE UP (ref 96–108)
CO2 SERPL-SCNC: 20 MMOL/L — LOW (ref 22–31)
CREAT SERPL-MCNC: 0.62 MG/DL — SIGNIFICANT CHANGE UP (ref 0.5–1.3)
EGFR: 97 ML/MIN/1.73M2 — SIGNIFICANT CHANGE UP
EGFR: 97 ML/MIN/1.73M2 — SIGNIFICANT CHANGE UP
GLUCOSE SERPL-MCNC: 160 MG/DL — HIGH (ref 70–99)
HCT VFR BLD CALC: 44.8 % — SIGNIFICANT CHANGE UP (ref 34.5–45)
HGB BLD-MCNC: 15.4 G/DL — SIGNIFICANT CHANGE UP (ref 11.5–15.5)
MCHC RBC-ENTMCNC: 31.5 PG — SIGNIFICANT CHANGE UP (ref 27–34)
MCHC RBC-ENTMCNC: 34.4 G/DL — SIGNIFICANT CHANGE UP (ref 32–36)
MCV RBC AUTO: 91.6 FL — SIGNIFICANT CHANGE UP (ref 80–100)
NRBC BLD AUTO-RTO: 0 /100 WBCS — SIGNIFICANT CHANGE UP (ref 0–0)
PLATELET # BLD AUTO: 336 K/UL — SIGNIFICANT CHANGE UP (ref 150–400)
POTASSIUM SERPL-MCNC: 4.4 MMOL/L — SIGNIFICANT CHANGE UP (ref 3.5–5.3)
POTASSIUM SERPL-SCNC: 4.4 MMOL/L — SIGNIFICANT CHANGE UP (ref 3.5–5.3)
RBC # BLD: 4.89 M/UL — SIGNIFICANT CHANGE UP (ref 3.8–5.2)
RBC # FLD: 11.8 % — SIGNIFICANT CHANGE UP (ref 10.3–14.5)
SODIUM SERPL-SCNC: 140 MMOL/L — SIGNIFICANT CHANGE UP (ref 135–145)
WBC # BLD: 7.37 K/UL — SIGNIFICANT CHANGE UP (ref 3.8–10.5)
WBC # FLD AUTO: 7.37 K/UL — SIGNIFICANT CHANGE UP (ref 3.8–10.5)

## 2025-04-03 PROCEDURE — 80048 BASIC METABOLIC PNL TOTAL CA: CPT

## 2025-04-03 PROCEDURE — 93005 ELECTROCARDIOGRAM TRACING: CPT | Mod: XU

## 2025-04-03 PROCEDURE — 99152 MOD SED SAME PHYS/QHP 5/>YRS: CPT

## 2025-04-03 PROCEDURE — 93010 ELECTROCARDIOGRAM REPORT: CPT

## 2025-04-03 PROCEDURE — 93458 L HRT ARTERY/VENTRICLE ANGIO: CPT

## 2025-04-03 PROCEDURE — C1769: CPT

## 2025-04-03 PROCEDURE — C1894: CPT

## 2025-04-03 PROCEDURE — C1887: CPT

## 2025-04-03 PROCEDURE — 85027 COMPLETE CBC AUTOMATED: CPT

## 2025-04-03 PROCEDURE — 93458 L HRT ARTERY/VENTRICLE ANGIO: CPT | Mod: 26

## 2025-04-03 RX ORDER — METOPROLOL SUCCINATE 50 MG/1
1 TABLET, EXTENDED RELEASE ORAL
Qty: 90 | Refills: 3
Start: 2025-04-03 | End: 2026-03-28

## 2025-04-03 RX ORDER — EMPAGLIFLOZIN 25 MG/1
1 TABLET, FILM COATED ORAL
Refills: 0 | DISCHARGE

## 2025-04-03 RX ORDER — LISINOPRIL AND HYDROCHLOROTHIAZIDE 12.5; 2 MG/1; MG/1
1 TABLET ORAL
Refills: 0 | DISCHARGE

## 2025-04-03 RX ORDER — METOPROLOL SUCCINATE 50 MG/1
1 TABLET, EXTENDED RELEASE ORAL
Refills: 0 | DISCHARGE

## 2025-04-03 RX ADMIN — Medication 75 MILLILITER(S): at 08:05

## 2025-04-03 NOTE — H&P CARDIOLOGY - HISTORY OF PRESENT ILLNESS
68 y/o female current smoker (18 PYH) with pmh of HLD, CAD s/p PCI/JAE mRCA 90%, mLAD 30%, OA s/p bilateral hip replacements     NST 3/21/2025  Conclusions:     1. Myocardial Perfusion: Abnormal.   2. Qualitative Perfusion:      - medium-sized, mild defect(s) in the apical inferior, mid inferior and mid inferolateral walls that are reversible consistent with ischemia.   3. The post stress left ventricular EF is 82 %. The stress end diastolic volume is 66 ml and systolic volume is 12 ml.    TTE 2/6/2023  CONCLUSIONS:    1.Left ventricular endocardium was not well visualized; however, the left ventricular systolic function appears hyperdynamic. Left ventricular ejection fraction, by visual estimation is >75%. There is turbulent flow in the left ventricular outflow tract with a resting gradient of 5 mmHg, which increased to 29 mmHg with valsalva.  2.There is mild left ventricular concentric hypertrophy.  3.There is mild (Grade 1) left ventricular diastolic dysfunction.  4.Normal right ventricular size, with mildly increased wall thickness.  5.Right ventricular systolic function is normal.  6.Normal left atrial size.  7.Aortic valve was not well visualized; valve appears mildly calcified. Aortic mean gradient equals 9.6 mmHg, which is consistent with a normally opening valve.  8.Structurally normal mitral valve with normal leaflet excursion.9.Compared to a prior transthoracic study from 9/18/2020, there is a small gradient seen in the left ventricular outflow tract 68 y/o female former smoker (15 PYH) with pmh of HLD, uncontrolled T2DM (AIc 10.1 12/2024) now on basal insulin and glimepiride with no recent AIc, CAD s/p PCI/JAE mRCA 90%, mLAD 30%, OA s/p bilateral hip replacements, murmur (not indicated on TTE 2023) is followed by Dr. Ezio Montiel, Cardiologist with reports of shortness of breath when walking up stairs, right lower extremity unilateral swelling and cramping in her foot.  She is s/p NST (below) with reversible ischemia and LVEF 82%.  Vascular dopplers negative for DVT and is scheduled to be seen by a vascular doctor in the near future.  She presents today for [facility] for further evaluation of her CAD and denies CP, SOB or palpitations.      NST 3/21/2025  Conclusions:     1. Myocardial Perfusion: Abnormal.   2. Qualitative Perfusion:      - medium-sized, mild defect(s) in the apical inferior, mid inferior and mid inferolateral walls that are reversible consistent with ischemia.   3. The post stress left ventricular EF is 82 %. The stress end diastolic volume is 66 ml and systolic volume is 12 ml.    US Duplex bilateral lower extremities 3/18/2025  IMPRESSION:    No evidence of deep venous thrombosis in either lower extremity.    TTE 2/6/2023  CONCLUSIONS:    1.Left ventricular endocardium was not well visualized; however, the left ventricular systolic function appears hyperdynamic. Left ventricular ejection fraction, by visual estimation is >75%. There is turbulent flow in the left ventricular outflow tract with a resting gradient of 5 mmHg, which increased to 29 mmHg with valsalva.  2.There is mild left ventricular concentric hypertrophy.  3.There is mild (Grade 1) left ventricular diastolic dysfunction.  4.Normal right ventricular size, with mildly increased wall thickness.  5.Right ventricular systolic function is normal.  6.Normal left atrial size.  7.Aortic valve was not well visualized; valve appears mildly calcified. Aortic mean gradient equals 9.6 mmHg, which is consistent with a normally opening valve.  8.Structurally normal mitral valve with normal leaflet excursion.9.Compared to a prior transthoracic study from 9/18/2020, there is a small gradient seen in the left ventricular outflow tract 68 y/o female former smoker (15 PYH) with pmh of HLD, uncontrolled T2DM (AIc 10.1 12/2024) now on basal insulin and glimepiride with no recent AIc, CAD s/p PCI/JAE mRCA 90%, mLAD 30%, OA s/p bilateral hip replacements, LVOT murmur 2/2 HOCM  is followed by Dr. Ezio Montiel, Cardiologist with reports of shortness of breath when walking up stairs, right lower extremity unilateral swelling and cramping in her foot.  She is s/p NST (below) with reversible ischemia and LVEF 82%.  Vascular dopplers negative for DVT and is scheduled to be seen by a vascular doctor in the near future.  She presents today for [facility] for further evaluation of her CAD and denies CP, SOB or palpitations.      NST 3/21/2025  Conclusions:     1. Myocardial Perfusion: Abnormal.   2. Qualitative Perfusion:      - medium-sized, mild defect(s) in the apical inferior, mid inferior and mid inferolateral walls that are reversible consistent with ischemia.   3. The post stress left ventricular EF is 82 %. The stress end diastolic volume is 66 ml and systolic volume is 12 ml.    US Duplex bilateral lower extremities 3/18/2025  IMPRESSION:    No evidence of deep venous thrombosis in either lower extremity.    TTE 2/6/2023  CONCLUSIONS:    1.Left ventricular endocardium was not well visualized; however, the left ventricular systolic function appears hyperdynamic. Left ventricular ejection fraction, by visual estimation is >75%. There is turbulent flow in the left ventricular outflow tract with a resting gradient of 5 mmHg, which increased to 29 mmHg with valsalva.  2.There is mild left ventricular concentric hypertrophy.  3.There is mild (Grade 1) left ventricular diastolic dysfunction.  4.Normal right ventricular size, with mildly increased wall thickness.  5.Right ventricular systolic function is normal.  6.Normal left atrial size.  7.Aortic valve was not well visualized; valve appears mildly calcified. Aortic mean gradient equals 9.6 mmHg, which is consistent with a normally opening valve.  8.Structurally normal mitral valve with normal leaflet excursion.9.Compared to a prior transthoracic study from 9/18/2020, there is a small gradient seen in the left ventricular outflow tract

## 2025-04-03 NOTE — ASU DISCHARGE PLAN (ADULT/PEDIATRIC) - ASU DC SPECIAL INSTRUCTIONSFT
Please see preprinted discharge instruction sheet.    You have been diagnosed with Hypertrophic obstructive cardiomyopathy and will need to change your Metoprolol tartrate 25mg twice daily to Metoprolol Succinate 50mg daily.  (I'm not sure if Dr. Montiel already changed your Metoprolol as I saw this dose in his note).  I sent a prescription to your pharmacy.

## 2025-04-03 NOTE — ASU DISCHARGE PLAN (ADULT/PEDIATRIC) - CARE PROVIDER_API CALL
Ezio Montiel  Cardiovascular Disease  [address] [place] [address]  Phone: [phone]  Fax: [phone]  Established Patient  Follow Up Time: 1 month

## 2025-04-03 NOTE — H&P CARDIOLOGY - NSICDXPASTMEDICALHX_GEN_ALL_CORE_FT
PAST MEDICAL HISTORY:  Anxiety     CAD (coronary artery disease)     Diabetes mellitus     H/O cardiac murmur     Hyperlipidemia on meds    Murmur benign    Osteoarthrosis hip replacement left 2014    Rosacea      PAST MEDICAL HISTORY:  Anxiety     CAD (coronary artery disease)     Diabetes mellitus     H/O cardiac murmur     HOCM (hypertrophic obstructive cardiomyopathy)     Hyperlipidemia on meds    Murmur benign    Osteoarthrosis hip replacement left 2014    Rosacea

## 2025-04-03 NOTE — ASU PREOP CHECKLIST - DENTURES
Medical Week 3 Survey      Responses   [facility], [place], operated by [facility] patient discharged from? [place]   Does the patient have one of the following disease processes/diagnoses(primary or secondary)? Other   Week 3 attempt successful? Yes   Call start time 1154   Call end time 1204   Meds reviewed with patient/caregiver? Yes   Is the patient taking all medications as directed (includes completed medication regime)? Yes   Medication comments has to increase her Gabapentin   Has the patient kept scheduled appointments due by today? Yes   Comments has kept appointment   What is the patient's perception of their health status since discharge? Improving   Week 3 Call Completed? Yes   Wrap up additional comments has an area under skin where recieve  Lovonox injection, large knot  whuch  is hard,           Li Ledesma RN  
no

## 2025-04-03 NOTE — H&P CARDIOLOGY - NSICDXPASTSURGICALHX_GEN_ALL_CORE_FT
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

## 2025-04-03 NOTE — ASU DISCHARGE PLAN (ADULT/PEDIATRIC) - FINANCIAL ASSISTANCE
[facility] provides services at a reduced cost to those who are determined to be eligible through [facility]’s financial assistance program. Information regarding [facility]’s financial assistance program can be found by going to [email or web address] or by calling [phone].

## 2025-04-08 ENCOUNTER — APPOINTMENT (OUTPATIENT)
Dept: CARDIOLOGY | Facility: CLINIC | Age: 70
End: 2025-04-08
Payer: MEDICARE

## 2025-04-08 ENCOUNTER — NON-APPOINTMENT (OUTPATIENT)
Age: 70
End: 2025-04-08

## 2025-04-08 VITALS
HEIGHT: 68 IN | TEMPERATURE: 98.2 F | SYSTOLIC BLOOD PRESSURE: 149 MMHG | RESPIRATION RATE: 16 BRPM | BODY MASS INDEX: 28.8 KG/M2 | WEIGHT: 190.01 LBS | DIASTOLIC BLOOD PRESSURE: 72 MMHG | OXYGEN SATURATION: 97 % | HEART RATE: 98 BPM

## 2025-04-08 DIAGNOSIS — I10 ESSENTIAL (PRIMARY) HYPERTENSION: ICD-10-CM

## 2025-04-08 DIAGNOSIS — I25.10 ATHEROSCLEROTIC HEART DISEASE OF NATIVE CORONARY ARTERY W/OUT ANGINA PECTORIS: ICD-10-CM

## 2025-04-08 PROBLEM — I42.1 OBSTRUCTIVE HYPERTROPHIC CARDIOMYOPATHY: Chronic | Status: ACTIVE | Noted: 2025-04-03

## 2025-04-08 PROBLEM — Z86.79 PERSONAL HISTORY OF OTHER DISEASES OF THE CIRCULATORY SYSTEM: Chronic | Status: ACTIVE | Noted: 2025-04-03

## 2025-04-08 PROBLEM — E11.9 TYPE 2 DIABETES MELLITUS WITHOUT COMPLICATIONS: Chronic | Status: ACTIVE | Noted: 2025-04-03

## 2025-04-08 PROCEDURE — 99215 OFFICE O/P EST HI 40 MIN: CPT

## 2025-04-08 PROCEDURE — G2211 COMPLEX E/M VISIT ADD ON: CPT

## 2025-04-11 ENCOUNTER — NON-APPOINTMENT (OUTPATIENT)
Age: 70
End: 2025-04-11

## 2025-04-12 ENCOUNTER — NON-APPOINTMENT (OUTPATIENT)
Age: 70
End: 2025-04-12

## 2025-04-17 NOTE — REVIEW OF SYSTEMS
patient representative [Fever] : no fever [Earache] : no earache [Chills] : no chills [Fatigue] : no fatigue [Nasal Discharge] : no nasal discharge [Sore Throat] : no sore throat [Hoarseness] : no hoarseness [Postnasal Drip] : no postnasal drip [Nausea] : no nausea [Abdominal Pain] : no abdominal pain [Constipation] : no constipation [Diarrhea] : diarrhea [Vomiting] : no vomiting [Joint Pain] : joint pain [Heartburn] : no heartburn [Joint Stiffness] : joint stiffness [Muscle Pain] : no muscle pain [Joint Swelling] : no joint swelling [Muscle Weakness] : no muscle weakness [Back Pain] : no back pain [Negative] : Integumentary

## 2025-04-21 ENCOUNTER — APPOINTMENT (OUTPATIENT)
Dept: INTERNAL MEDICINE | Facility: CLINIC | Age: 70
End: 2025-04-21
Payer: MEDICARE

## 2025-04-21 VITALS
HEART RATE: 66 BPM | BODY MASS INDEX: 28.5 KG/M2 | OXYGEN SATURATION: 96 % | TEMPERATURE: 98.3 F | RESPIRATION RATE: 16 BRPM | HEIGHT: 68 IN | SYSTOLIC BLOOD PRESSURE: 138 MMHG | DIASTOLIC BLOOD PRESSURE: 79 MMHG | WEIGHT: 188.06 LBS

## 2025-04-21 DIAGNOSIS — R73.01 IMPAIRED FASTING GLUCOSE: ICD-10-CM

## 2025-04-21 DIAGNOSIS — R20.2 ANESTHESIA OF SKIN: ICD-10-CM

## 2025-04-21 DIAGNOSIS — H40.9 UNSPECIFIED GLAUCOMA: ICD-10-CM

## 2025-04-21 DIAGNOSIS — K21.9 GASTRO-ESOPHAGEAL REFLUX DISEASE W/OUT ESOPHAGITIS: ICD-10-CM

## 2025-04-21 DIAGNOSIS — R20.0 ANESTHESIA OF SKIN: ICD-10-CM

## 2025-04-21 DIAGNOSIS — R81 GLYCOSURIA: ICD-10-CM

## 2025-04-21 DIAGNOSIS — I21.01: ICD-10-CM

## 2025-04-21 DIAGNOSIS — D32.9 BENIGN NEOPLASM OF MENINGES, UNSPECIFIED: ICD-10-CM

## 2025-04-21 DIAGNOSIS — E11.9 TYPE 2 DIABETES MELLITUS W/OUT COMPLICATIONS: ICD-10-CM

## 2025-04-21 DIAGNOSIS — E11.65 TYPE 2 DIABETES MELLITUS WITH HYPERGLYCEMIA: ICD-10-CM

## 2025-04-21 DIAGNOSIS — R79.89 OTHER SPECIFIED ABNORMAL FINDINGS OF BLOOD CHEMISTRY: ICD-10-CM

## 2025-04-21 DIAGNOSIS — I25.10 ATHEROSCLEROTIC HEART DISEASE OF NATIVE CORONARY ARTERY W/OUT ANGINA PECTORIS: ICD-10-CM

## 2025-04-21 DIAGNOSIS — F32.A DEPRESSION, UNSPECIFIED: ICD-10-CM

## 2025-04-21 DIAGNOSIS — F41.8 OTHER SPECIFIED ANXIETY DISORDERS: ICD-10-CM

## 2025-04-21 DIAGNOSIS — I65.29 OCCLUSION AND STENOSIS OF UNSPECIFIED CAROTID ARTERY: ICD-10-CM

## 2025-04-21 DIAGNOSIS — I10 ESSENTIAL (PRIMARY) HYPERTENSION: ICD-10-CM

## 2025-04-21 DIAGNOSIS — M54.16 RADICULOPATHY, LUMBAR REGION: ICD-10-CM

## 2025-04-21 DIAGNOSIS — R80.9 PROTEINURIA, UNSPECIFIED: ICD-10-CM

## 2025-04-21 DIAGNOSIS — Z87.891 PERSONAL HISTORY OF NICOTINE DEPENDENCE: ICD-10-CM

## 2025-04-21 DIAGNOSIS — E11.43 TYPE 2 DIABETES MELLITUS WITH DIABETIC AUTONOMIC (POLY)NEUROPATHY: ICD-10-CM

## 2025-04-21 DIAGNOSIS — M79.631 PAIN IN RIGHT FOREARM: ICD-10-CM

## 2025-04-21 DIAGNOSIS — Z63.6 DEPENDENT RELATIVE NEEDING CARE AT HOME: ICD-10-CM

## 2025-04-21 DIAGNOSIS — E78.5 HYPERLIPIDEMIA, UNSPECIFIED: ICD-10-CM

## 2025-04-21 LAB
25(OH)D3 SERPL-MCNC: 47.3 NG/ML
ALBUMIN SERPL ELPH-MCNC: 4.7 G/DL
ALP BLD-CCNC: 178 U/L
ALT SERPL-CCNC: 51 U/L
ANION GAP SERPL CALC-SCNC: 15 MMOL/L
APPEARANCE: CLEAR
AST SERPL-CCNC: 32 U/L
BASOPHILS # BLD AUTO: 0.02 K/UL
BASOPHILS NFR BLD AUTO: 0.2 %
BILIRUB SERPL-MCNC: 0.5 MG/DL
BILIRUBIN URINE: NEGATIVE
BLOOD URINE: NEGATIVE
BUN SERPL-MCNC: 28 MG/DL
CALCIUM SERPL-MCNC: 9.9 MG/DL
CHLORIDE SERPL-SCNC: 103 MMOL/L
CHOLEST SERPL-MCNC: 212 MG/DL
CO2 SERPL-SCNC: 21 MMOL/L
COLOR: YELLOW
CREAT SERPL-MCNC: 0.71 MG/DL
EGFRCR SERPLBLD CKD-EPI 2021: 93 ML/MIN/1.73M2
EOSINOPHIL # BLD AUTO: 0.23 K/UL
EOSINOPHIL NFR BLD AUTO: 2.9 %
ESTIMATED AVERAGE GLUCOSE: 171 MG/DL
FOLATE SERPL-MCNC: 9.8 NG/ML
GLUCOSE QUALITATIVE U: NEGATIVE MG/DL
GLUCOSE SERPL-MCNC: 139 MG/DL
HBA1C MFR BLD HPLC: 7.6 %
HCT VFR BLD CALC: 40.7 %
HDLC SERPL-MCNC: 72 MG/DL
HGB BLD-MCNC: 13.6 G/DL
IMM GRANULOCYTES NFR BLD AUTO: 0.5 %
KETONES URINE: NEGATIVE MG/DL
LDLC SERPL-MCNC: 116 MG/DL
LEUKOCYTE ESTERASE URINE: NEGATIVE
LYMPHOCYTES # BLD AUTO: 1.25 K/UL
LYMPHOCYTES NFR BLD AUTO: 15.5 %
MAN DIFF?: NORMAL
MCHC RBC-ENTMCNC: 31.1 PG
MCHC RBC-ENTMCNC: 33.4 G/DL
MCV RBC AUTO: 93.1 FL
MONOCYTES # BLD AUTO: 0.68 K/UL
MONOCYTES NFR BLD AUTO: 8.4 %
NEUTROPHILS # BLD AUTO: 5.85 K/UL
NEUTROPHILS NFR BLD AUTO: 72.5 %
NITRITE URINE: NEGATIVE
NONHDLC SERPL-MCNC: 139 MG/DL
PH URINE: 5.5
PLATELET # BLD AUTO: 311 K/UL
POTASSIUM SERPL-SCNC: 4.9 MMOL/L
PROT SERPL-MCNC: 7.3 G/DL
PROTEIN URINE: NORMAL MG/DL
RBC # BLD: 4.37 M/UL
RBC # FLD: 11.9 %
SODIUM SERPL-SCNC: 139 MMOL/L
SPECIFIC GRAVITY URINE: 1.02
TRIGL SERPL-MCNC: 134 MG/DL
TSH SERPL-ACNC: 5.5 UIU/ML
UROBILINOGEN URINE: 0.2 MG/DL
VIT B12 SERPL-MCNC: 571 PG/ML
WBC # FLD AUTO: 8.07 K/UL

## 2025-04-21 PROCEDURE — G2211 COMPLEX E/M VISIT ADD ON: CPT

## 2025-04-21 PROCEDURE — 99215 OFFICE O/P EST HI 40 MIN: CPT

## 2025-04-21 PROCEDURE — 36415 COLL VENOUS BLD VENIPUNCTURE: CPT

## 2025-04-21 RX ORDER — DULOXETINE HYDROCHLORIDE 20 MG/1
20 CAPSULE, DELAYED RELEASE PELLETS ORAL
Qty: 30 | Refills: 1 | Status: ACTIVE | COMMUNITY
Start: 2025-04-21 | End: 1900-01-01

## 2025-04-21 SDOH — SOCIAL STABILITY - SOCIAL INSECURITY: DEPENDENT RELATIVE NEEDING CARE AT HOME: Z63.6

## 2025-04-22 ENCOUNTER — NON-APPOINTMENT (OUTPATIENT)
Age: 70
End: 2025-04-22

## 2025-04-22 ENCOUNTER — APPOINTMENT (OUTPATIENT)
Dept: CARDIOLOGY | Facility: CLINIC | Age: 70
End: 2025-04-22
Payer: MEDICARE

## 2025-04-22 VITALS
OXYGEN SATURATION: 98 % | HEART RATE: 88 BPM | HEIGHT: 68 IN | WEIGHT: 188 LBS | DIASTOLIC BLOOD PRESSURE: 84 MMHG | BODY MASS INDEX: 28.49 KG/M2 | SYSTOLIC BLOOD PRESSURE: 179 MMHG

## 2025-04-22 DIAGNOSIS — I70.208 UNSPECIFIED ATHEROSCLEROSIS OF NATIVE ARTERIES OF EXTREMITIES, OTHER EXTREMITY: ICD-10-CM

## 2025-04-22 DIAGNOSIS — Z98.62 PERIPHERAL VASCULAR ANGIOPLASTY STATUS: ICD-10-CM

## 2025-04-22 PROCEDURE — G2211 COMPLEX E/M VISIT ADD ON: CPT

## 2025-04-22 PROCEDURE — 93000 ELECTROCARDIOGRAM COMPLETE: CPT

## 2025-04-22 PROCEDURE — 99215 OFFICE O/P EST HI 40 MIN: CPT

## 2025-04-24 ENCOUNTER — OFFICE (OUTPATIENT)
Facility: LOCATION | Age: 70
Setting detail: OPHTHALMOLOGY
End: 2025-04-24
Payer: MEDICARE

## 2025-04-24 DIAGNOSIS — H40.1124: ICD-10-CM

## 2025-04-24 DIAGNOSIS — H16.223: ICD-10-CM

## 2025-04-24 DIAGNOSIS — H40.1111: ICD-10-CM

## 2025-04-24 PROCEDURE — 92083 EXTENDED VISUAL FIELD XM: CPT | Performed by: OPHTHALMOLOGY

## 2025-04-24 PROCEDURE — 99213 OFFICE O/P EST LOW 20 MIN: CPT | Performed by: OPHTHALMOLOGY

## 2025-04-24 ASSESSMENT — KERATOMETRY
OS_K1POWER_DIOPTERS: 44.00
OD_AXISANGLE_DEGREES: 161
OS_AXISANGLE_DEGREES: 090
OD_K2POWER_DIOPTERS: 44.50
OS_K2POWER_DIOPTERS: 44.00
OD_K1POWER_DIOPTERS: 44.00

## 2025-04-24 ASSESSMENT — SUPERFICIAL PUNCTATE KERATITIS (SPK)
OS_SPK: 2+
OD_SPK: 2+

## 2025-04-24 ASSESSMENT — PACHYMETRY
OS_CT_CORRECTION: -3
OD_CT_UM: 607
OD_CT_CORRECTION: -4
OS_CT_UM: 584

## 2025-04-24 ASSESSMENT — REFRACTION_CURRENTRX
OS_SPHERE: +2.00
OD_SPHERE: +2.00
OD_OVR_VA: 20/
OS_OVR_VA: 20/

## 2025-04-24 ASSESSMENT — LID POSITION - PTOSIS
OD_PTOSIS: ABSENT
OS_PTOSIS: ABSENT

## 2025-04-24 ASSESSMENT — REFRACTION_AUTOREFRACTION
OS_AXIS: 077
OD_CYLINDER: -0.75
OS_CYLINDER: -0.50
OD_AXIS: 072
OS_SPHERE: +0.25
OD_SPHERE: +0.50

## 2025-04-24 ASSESSMENT — TONOMETRY
OS_IOP_MMHG: 18
OD_IOP_MMHG: 18

## 2025-04-24 ASSESSMENT — CONFRONTATIONAL VISUAL FIELD TEST (CVF)
OD_FINDINGS: FULL
OS_FINDINGS: FULL

## 2025-04-24 ASSESSMENT — VISUAL ACUITY
OS_BCVA: 20/70
OD_BCVA: 20/20-2

## 2025-04-25 RX ORDER — APIXABAN 5 MG/1
5 TABLET, FILM COATED ORAL
Qty: 60 | Refills: 3 | Status: ACTIVE | COMMUNITY
Start: 2025-04-22 | End: 1900-01-01

## 2025-05-02 ENCOUNTER — APPOINTMENT (OUTPATIENT)
Dept: ORTHOPEDIC SURGERY | Facility: CLINIC | Age: 70
End: 2025-05-02

## 2025-05-02 DIAGNOSIS — M18.11 UNILATERAL PRIMARY OSTEOARTHRITIS OF FIRST CARPOMETACARPAL JOINT, RIGHT HAND: ICD-10-CM

## 2025-05-02 PROCEDURE — 99213 OFFICE O/P EST LOW 20 MIN: CPT | Mod: 25

## 2025-05-02 PROCEDURE — 20605 DRAIN/INJ JOINT/BURSA W/O US: CPT | Mod: RT

## 2025-05-05 ENCOUNTER — APPOINTMENT (OUTPATIENT)
Dept: CARDIOLOGY | Facility: CLINIC | Age: 70
End: 2025-05-05

## 2025-05-27 ENCOUNTER — APPOINTMENT (OUTPATIENT)
Dept: INTERNAL MEDICINE | Facility: CLINIC | Age: 70
End: 2025-05-27

## 2025-05-27 ENCOUNTER — APPOINTMENT (OUTPATIENT)
Dept: CARDIOLOGY | Facility: CLINIC | Age: 70
End: 2025-05-27
Payer: MEDICARE

## 2025-05-27 ENCOUNTER — NON-APPOINTMENT (OUTPATIENT)
Age: 70
End: 2025-05-27

## 2025-05-27 ENCOUNTER — APPOINTMENT (OUTPATIENT)
Dept: ORTHOPEDIC SURGERY | Facility: CLINIC | Age: 70
End: 2025-05-27
Payer: MEDICARE

## 2025-05-27 ENCOUNTER — TRANSCRIPTION ENCOUNTER (OUTPATIENT)
Age: 70
End: 2025-05-27

## 2025-05-27 VITALS
OXYGEN SATURATION: 99 % | SYSTOLIC BLOOD PRESSURE: 109 MMHG | WEIGHT: 188 LBS | BODY MASS INDEX: 28.49 KG/M2 | DIASTOLIC BLOOD PRESSURE: 66 MMHG | HEIGHT: 68 IN | HEART RATE: 89 BPM

## 2025-05-27 VITALS — HEIGHT: 68 IN | WEIGHT: 188 LBS | BODY MASS INDEX: 28.49 KG/M2

## 2025-05-27 DIAGNOSIS — S50.01XA CONTUSION OF RIGHT ELBOW, INITIAL ENCOUNTER: ICD-10-CM

## 2025-05-27 DIAGNOSIS — E11.65 TYPE 2 DIABETES MELLITUS WITH HYPERGLYCEMIA: ICD-10-CM

## 2025-05-27 DIAGNOSIS — R73.01 IMPAIRED FASTING GLUCOSE: ICD-10-CM

## 2025-05-27 DIAGNOSIS — S16.1XXA STRAIN OF MUSCLE, FASCIA AND TENDON AT NECK LEVEL, INITIAL ENCOUNTER: ICD-10-CM

## 2025-05-27 PROCEDURE — G2211 COMPLEX E/M VISIT ADD ON: CPT

## 2025-05-27 PROCEDURE — 72040 X-RAY EXAM NECK SPINE 2-3 VW: CPT

## 2025-05-27 PROCEDURE — L0174: CPT

## 2025-05-27 PROCEDURE — 73080 X-RAY EXAM OF ELBOW: CPT | Mod: RT

## 2025-05-27 PROCEDURE — 99214 OFFICE O/P EST MOD 30 MIN: CPT

## 2025-05-27 PROCEDURE — 99215 OFFICE O/P EST HI 40 MIN: CPT

## 2025-05-27 PROCEDURE — 99204 OFFICE O/P NEW MOD 45 MIN: CPT

## 2025-05-27 PROCEDURE — 93000 ELECTROCARDIOGRAM COMPLETE: CPT

## 2025-05-27 RX ORDER — TRAMADOL HYDROCHLORIDE 50 MG/1
50 TABLET, COATED ORAL
Qty: 20 | Refills: 0 | Status: ACTIVE | COMMUNITY
Start: 2025-05-27 | End: 1900-01-01

## 2025-05-29 ENCOUNTER — APPOINTMENT (OUTPATIENT)
Dept: INTERNAL MEDICINE | Facility: CLINIC | Age: 70
End: 2025-05-29
Payer: MEDICARE

## 2025-05-29 ENCOUNTER — NON-APPOINTMENT (OUTPATIENT)
Age: 70
End: 2025-05-29

## 2025-05-29 ENCOUNTER — APPOINTMENT (OUTPATIENT)
Dept: MRI IMAGING | Facility: CLINIC | Age: 70
End: 2025-05-29
Payer: MEDICARE

## 2025-05-29 ENCOUNTER — RESULT REVIEW (OUTPATIENT)
Age: 70
End: 2025-05-29

## 2025-05-29 DIAGNOSIS — D32.9 BENIGN NEOPLASM OF MENINGES, UNSPECIFIED: ICD-10-CM

## 2025-05-29 DIAGNOSIS — F41.8 OTHER SPECIFIED ANXIETY DISORDERS: ICD-10-CM

## 2025-05-29 DIAGNOSIS — M48.02 SPINAL STENOSIS, CERVICAL REGION: ICD-10-CM

## 2025-05-29 DIAGNOSIS — Z87.891 PERSONAL HISTORY OF NICOTINE DEPENDENCE: ICD-10-CM

## 2025-05-29 DIAGNOSIS — M51.26 OTHER INTERVERTEBRAL DISC DISPLACEMENT, LUMBAR REGION: ICD-10-CM

## 2025-05-29 DIAGNOSIS — R20.2 ANESTHESIA OF SKIN: ICD-10-CM

## 2025-05-29 DIAGNOSIS — E11.43 TYPE 2 DIABETES MELLITUS WITH DIABETIC AUTONOMIC (POLY)NEUROPATHY: ICD-10-CM

## 2025-05-29 DIAGNOSIS — R81 GLYCOSURIA: ICD-10-CM

## 2025-05-29 DIAGNOSIS — M79.631 PAIN IN RIGHT FOREARM: ICD-10-CM

## 2025-05-29 DIAGNOSIS — Z95.5 PRESENCE OF CORONARY ANGIOPLASTY IMPLANT AND GRAFT: ICD-10-CM

## 2025-05-29 DIAGNOSIS — I21.01: ICD-10-CM

## 2025-05-29 DIAGNOSIS — R20.0 ANESTHESIA OF SKIN: ICD-10-CM

## 2025-05-29 DIAGNOSIS — M54.16 RADICULOPATHY, LUMBAR REGION: ICD-10-CM

## 2025-05-29 DIAGNOSIS — H40.9 UNSPECIFIED GLAUCOMA: ICD-10-CM

## 2025-05-29 DIAGNOSIS — I70.208 UNSPECIFIED ATHEROSCLEROSIS OF NATIVE ARTERIES OF EXTREMITIES, OTHER EXTREMITY: ICD-10-CM

## 2025-05-29 DIAGNOSIS — R74.8 ABNORMAL LEVELS OF OTHER SERUM ENZYMES: ICD-10-CM

## 2025-05-29 DIAGNOSIS — I25.10 ATHEROSCLEROTIC HEART DISEASE OF NATIVE CORONARY ARTERY W/OUT ANGINA PECTORIS: ICD-10-CM

## 2025-05-29 DIAGNOSIS — K21.9 GASTRO-ESOPHAGEAL REFLUX DISEASE W/OUT ESOPHAGITIS: ICD-10-CM

## 2025-05-29 DIAGNOSIS — Z91.81 HISTORY OF FALLING: ICD-10-CM

## 2025-05-29 PROBLEM — E11.65 UNCONTROLLED DIABETES MELLITUS WITH HYPERGLYCEMIA: Noted: 2024-08-24

## 2025-05-29 PROCEDURE — 72141 MRI NECK SPINE W/O DYE: CPT

## 2025-05-29 PROCEDURE — G2211 COMPLEX E/M VISIT ADD ON: CPT | Mod: 2W

## 2025-05-29 PROCEDURE — 99214 OFFICE O/P EST MOD 30 MIN: CPT | Mod: 2W

## 2025-05-30 ENCOUNTER — APPOINTMENT (OUTPATIENT)
Dept: ORTHOPEDIC SURGERY | Facility: CLINIC | Age: 70
End: 2025-05-30
Payer: MEDICARE

## 2025-05-30 DIAGNOSIS — M47.812 SPONDYLOSIS W/OUT MYELOPATHY OR RADICULOPATHY, CERVICAL REGION: ICD-10-CM

## 2025-05-30 DIAGNOSIS — S23.9XXA SPRAIN OF UNSPECIFIED PARTS OF THORAX, INITIAL ENCOUNTER: ICD-10-CM

## 2025-05-30 PROCEDURE — 72070 X-RAY EXAM THORAC SPINE 2VWS: CPT

## 2025-05-30 PROCEDURE — 99214 OFFICE O/P EST MOD 30 MIN: CPT

## 2025-06-02 ENCOUNTER — APPOINTMENT (OUTPATIENT)
Dept: ENDOCRINOLOGY | Facility: CLINIC | Age: 70
End: 2025-06-02
Payer: MEDICARE

## 2025-06-02 DIAGNOSIS — R79.89 OTHER SPECIFIED ABNORMAL FINDINGS OF BLOOD CHEMISTRY: ICD-10-CM

## 2025-06-02 DIAGNOSIS — E04.1 NONTOXIC SINGLE THYROID NODULE: ICD-10-CM

## 2025-06-02 DIAGNOSIS — E78.5 HYPERLIPIDEMIA, UNSPECIFIED: ICD-10-CM

## 2025-06-02 DIAGNOSIS — R80.9 PROTEINURIA, UNSPECIFIED: ICD-10-CM

## 2025-06-02 DIAGNOSIS — I10 ESSENTIAL (PRIMARY) HYPERTENSION: ICD-10-CM

## 2025-06-02 DIAGNOSIS — E11.9 TYPE 2 DIABETES MELLITUS W/OUT COMPLICATIONS: ICD-10-CM

## 2025-06-02 PROCEDURE — G2211 COMPLEX E/M VISIT ADD ON: CPT | Mod: 2W

## 2025-06-02 PROCEDURE — 99214 OFFICE O/P EST MOD 30 MIN: CPT | Mod: 2W

## 2025-06-03 ENCOUNTER — APPOINTMENT (OUTPATIENT)
Dept: MRI IMAGING | Facility: CLINIC | Age: 70
End: 2025-06-03
Payer: MEDICARE

## 2025-06-03 ENCOUNTER — NON-APPOINTMENT (OUTPATIENT)
Age: 70
End: 2025-06-03

## 2025-06-03 ENCOUNTER — RESULT REVIEW (OUTPATIENT)
Age: 70
End: 2025-06-03

## 2025-06-03 PROCEDURE — 72146 MRI CHEST SPINE W/O DYE: CPT

## 2025-06-09 ENCOUNTER — APPOINTMENT (OUTPATIENT)
Dept: ORTHOPEDIC SURGERY | Facility: CLINIC | Age: 70
End: 2025-06-09
Payer: MEDICARE

## 2025-06-09 PROCEDURE — 99214 OFFICE O/P EST MOD 30 MIN: CPT

## 2025-06-12 ENCOUNTER — APPOINTMENT (OUTPATIENT)
Dept: ORTHOPEDIC SURGERY | Facility: CLINIC | Age: 70
End: 2025-06-12
Payer: MEDICARE

## 2025-06-12 PROBLEM — S20.212A CONTUSION OF RIB ON LEFT SIDE, INITIAL ENCOUNTER: Status: ACTIVE | Noted: 2025-06-12

## 2025-06-12 PROCEDURE — 99214 OFFICE O/P EST MOD 30 MIN: CPT

## 2025-06-12 PROCEDURE — 73030 X-RAY EXAM OF SHOULDER: CPT | Mod: LT

## 2025-06-23 ENCOUNTER — RX RENEWAL (OUTPATIENT)
Age: 70
End: 2025-06-23

## 2025-06-30 ENCOUNTER — APPOINTMENT (OUTPATIENT)
Dept: INTERNAL MEDICINE | Facility: CLINIC | Age: 70
End: 2025-06-30
Payer: MEDICARE

## 2025-06-30 PROCEDURE — G2211 COMPLEX E/M VISIT ADD ON: CPT | Mod: 2W

## 2025-06-30 PROCEDURE — 99214 OFFICE O/P EST MOD 30 MIN: CPT | Mod: 2W

## 2025-07-01 ENCOUNTER — RX RENEWAL (OUTPATIENT)
Age: 70
End: 2025-07-01

## 2025-07-24 ENCOUNTER — OFFICE (OUTPATIENT)
Facility: LOCATION | Age: 70
Setting detail: OPHTHALMOLOGY
End: 2025-07-24
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
## 2025-07-24 PROCEDURE — NO SHOW FE NO SHOW FEE: Performed by: OPHTHALMOLOGY

## 2025-08-14 ENCOUNTER — APPOINTMENT (OUTPATIENT)
Dept: GASTROENTEROLOGY | Facility: CLINIC | Age: 70
End: 2025-08-14
Payer: MEDICARE

## 2025-08-14 VITALS
DIASTOLIC BLOOD PRESSURE: 64 MMHG | WEIGHT: 186 LBS | HEIGHT: 68 IN | HEART RATE: 78 BPM | BODY MASS INDEX: 28.19 KG/M2 | SYSTOLIC BLOOD PRESSURE: 118 MMHG | TEMPERATURE: 97.1 F | OXYGEN SATURATION: 98 %

## 2025-08-14 DIAGNOSIS — R12 HEARTBURN: ICD-10-CM

## 2025-08-14 DIAGNOSIS — Z95.5 PRESENCE OF CORONARY ANGIOPLASTY IMPLANT AND GRAFT: ICD-10-CM

## 2025-08-14 DIAGNOSIS — Z80.3 FAMILY HISTORY OF MALIGNANT NEOPLASM OF BREAST: ICD-10-CM

## 2025-08-14 DIAGNOSIS — R11.0 NAUSEA: ICD-10-CM

## 2025-08-14 DIAGNOSIS — Z86.0101 PERSONAL HISTORY OF ADENOMATOUS AND SERRATED COLON POLYPS: ICD-10-CM

## 2025-08-14 DIAGNOSIS — R19.5 OTHER FECAL ABNORMALITIES: ICD-10-CM

## 2025-08-14 PROCEDURE — 99203 OFFICE O/P NEW LOW 30 MIN: CPT

## 2025-08-14 RX ORDER — SODIUM PICOSULFATE, MAGNESIUM OXIDE, AND ANHYDROUS CITRIC ACID 12; 3.5; 1 G/175ML; G/175ML; MG/175ML
10-3.5-12 MG-GM LIQUID ORAL
Qty: 2 | Refills: 0 | Status: ACTIVE | COMMUNITY
Start: 2025-08-14 | End: 1900-01-01

## 2025-08-14 RX ORDER — MAGNESIUM OXIDE/MAG AA CHELATE 300 MG
CAPSULE ORAL
Refills: 0 | Status: ACTIVE | COMMUNITY

## 2025-08-14 RX ORDER — CHOLECALCIFEROL (VITAMIN D3) 25 MCG
TABLET ORAL
Refills: 0 | Status: ACTIVE | COMMUNITY

## 2025-08-18 RX ORDER — SODIUM SULFATE, POTASSIUM SULFATE AND MAGNESIUM SULFATE 1.6; 3.13; 17.5 G/177ML; G/177ML; G/177ML
17.5-3.13-1.6 SOLUTION ORAL
Qty: 1 | Refills: 0 | Status: ACTIVE | COMMUNITY
Start: 2025-08-18 | End: 1900-01-01

## 2025-08-19 ENCOUNTER — APPOINTMENT (OUTPATIENT)
Dept: CARDIOLOGY | Facility: CLINIC | Age: 70
End: 2025-08-19
Payer: MEDICARE

## 2025-08-19 ENCOUNTER — APPOINTMENT (OUTPATIENT)
Dept: CARDIOLOGY | Facility: CLINIC | Age: 70
End: 2025-08-19

## 2025-08-19 VITALS
SYSTOLIC BLOOD PRESSURE: 155 MMHG | OXYGEN SATURATION: 98 % | DIASTOLIC BLOOD PRESSURE: 79 MMHG | HEART RATE: 71 BPM | WEIGHT: 186 LBS | HEIGHT: 68 IN | BODY MASS INDEX: 28.19 KG/M2

## 2025-08-19 PROCEDURE — G2211 COMPLEX E/M VISIT ADD ON: CPT

## 2025-08-19 PROCEDURE — 93000 ELECTROCARDIOGRAM COMPLETE: CPT

## 2025-08-19 PROCEDURE — 99215 OFFICE O/P EST HI 40 MIN: CPT

## 2025-08-21 ENCOUNTER — RX RENEWAL (OUTPATIENT)
Age: 70
End: 2025-08-21

## 2025-08-22 ENCOUNTER — RX RENEWAL (OUTPATIENT)
Age: 70
End: 2025-08-22

## 2025-08-26 ENCOUNTER — APPOINTMENT (OUTPATIENT)
Dept: ORTHOPEDIC SURGERY | Facility: CLINIC | Age: 70
End: 2025-08-26

## 2025-08-29 ENCOUNTER — APPOINTMENT (OUTPATIENT)
Dept: INTERNAL MEDICINE | Facility: CLINIC | Age: 70
End: 2025-08-29
Payer: MEDICARE

## 2025-08-29 VITALS
HEART RATE: 70 BPM | SYSTOLIC BLOOD PRESSURE: 165 MMHG | BODY MASS INDEX: 28.64 KG/M2 | DIASTOLIC BLOOD PRESSURE: 87 MMHG | TEMPERATURE: 97.9 F | OXYGEN SATURATION: 99 % | RESPIRATION RATE: 15 BRPM | HEIGHT: 68 IN | WEIGHT: 189 LBS

## 2025-08-29 VITALS — DIASTOLIC BLOOD PRESSURE: 70 MMHG | SYSTOLIC BLOOD PRESSURE: 140 MMHG

## 2025-08-29 DIAGNOSIS — I21.01: ICD-10-CM

## 2025-08-29 DIAGNOSIS — F41.8 OTHER SPECIFIED ANXIETY DISORDERS: ICD-10-CM

## 2025-08-29 DIAGNOSIS — I65.29 OCCLUSION AND STENOSIS OF UNSPECIFIED CAROTID ARTERY: ICD-10-CM

## 2025-08-29 DIAGNOSIS — R92.30 DENSE BREASTS, UNSPECIFIED: ICD-10-CM

## 2025-08-29 DIAGNOSIS — M81.0 AGE-RELATED OSTEOPOROSIS W/OUT CURRENT PATHOLOGICAL FRACTURE: ICD-10-CM

## 2025-08-29 DIAGNOSIS — R73.01 IMPAIRED FASTING GLUCOSE: ICD-10-CM

## 2025-08-29 DIAGNOSIS — R74.8 ABNORMAL LEVELS OF OTHER SERUM ENZYMES: ICD-10-CM

## 2025-08-29 DIAGNOSIS — K21.9 GASTRO-ESOPHAGEAL REFLUX DISEASE W/OUT ESOPHAGITIS: ICD-10-CM

## 2025-08-29 DIAGNOSIS — Z63.6 DEPENDENT RELATIVE NEEDING CARE AT HOME: ICD-10-CM

## 2025-08-29 DIAGNOSIS — R79.89 OTHER SPECIFIED ABNORMAL FINDINGS OF BLOOD CHEMISTRY: ICD-10-CM

## 2025-08-29 DIAGNOSIS — Z12.31 ENCOUNTER FOR SCREENING MAMMOGRAM FOR MALIGNANT NEOPLASM OF BREAST: ICD-10-CM

## 2025-08-29 DIAGNOSIS — Z00.00 ENCOUNTER FOR GENERAL ADULT MEDICAL EXAMINATION W/OUT ABNORMAL FINDINGS: ICD-10-CM

## 2025-08-29 DIAGNOSIS — S23.9XXA SPRAIN OF UNSPECIFIED PARTS OF THORAX, INITIAL ENCOUNTER: ICD-10-CM

## 2025-08-29 DIAGNOSIS — Z95.5 PRESENCE OF CORONARY ANGIOPLASTY IMPLANT AND GRAFT: ICD-10-CM

## 2025-08-29 DIAGNOSIS — T78.40XA ALLERGY, UNSPECIFIED, INITIAL ENCOUNTER: ICD-10-CM

## 2025-08-29 DIAGNOSIS — R20.2 ANESTHESIA OF SKIN: ICD-10-CM

## 2025-08-29 DIAGNOSIS — D32.9 BENIGN NEOPLASM OF MENINGES, UNSPECIFIED: ICD-10-CM

## 2025-08-29 DIAGNOSIS — E04.1 NONTOXIC SINGLE THYROID NODULE: ICD-10-CM

## 2025-08-29 DIAGNOSIS — I10 ESSENTIAL (PRIMARY) HYPERTENSION: ICD-10-CM

## 2025-08-29 DIAGNOSIS — R20.0 ANESTHESIA OF SKIN: ICD-10-CM

## 2025-08-29 DIAGNOSIS — H40.9 UNSPECIFIED GLAUCOMA: ICD-10-CM

## 2025-08-29 DIAGNOSIS — S16.1XXA STRAIN OF MUSCLE, FASCIA AND TENDON AT NECK LEVEL, INITIAL ENCOUNTER: ICD-10-CM

## 2025-08-29 DIAGNOSIS — E78.5 HYPERLIPIDEMIA, UNSPECIFIED: ICD-10-CM

## 2025-08-29 DIAGNOSIS — I25.10 ATHEROSCLEROTIC HEART DISEASE OF NATIVE CORONARY ARTERY W/OUT ANGINA PECTORIS: ICD-10-CM

## 2025-08-29 DIAGNOSIS — Z87.891 PERSONAL HISTORY OF NICOTINE DEPENDENCE: ICD-10-CM

## 2025-08-29 DIAGNOSIS — M54.16 RADICULOPATHY, LUMBAR REGION: ICD-10-CM

## 2025-08-29 PROCEDURE — 99214 OFFICE O/P EST MOD 30 MIN: CPT | Mod: 25

## 2025-08-29 PROCEDURE — G0538: CPT

## 2025-08-29 PROCEDURE — G0439: CPT

## 2025-08-29 PROCEDURE — G0296 VISIT TO DETERM LDCT ELIG: CPT

## 2025-08-29 PROCEDURE — G0444 DEPRESSION SCREEN ANNUAL: CPT | Mod: XU

## 2025-08-29 PROCEDURE — 36415 COLL VENOUS BLD VENIPUNCTURE: CPT

## 2025-08-29 PROCEDURE — G2211 COMPLEX E/M VISIT ADD ON: CPT

## 2025-08-29 RX ORDER — SERTRALINE HYDROCHLORIDE 50 MG/1
50 TABLET, FILM COATED ORAL DAILY
Qty: 31 | Refills: 1 | Status: ACTIVE | COMMUNITY
Start: 2025-08-29 | End: 1900-01-01

## 2025-08-29 SDOH — SOCIAL STABILITY - SOCIAL INSECURITY: DEPENDENT RELATIVE NEEDING CARE AT HOME: Z63.6

## 2025-08-30 ENCOUNTER — TRANSCRIPTION ENCOUNTER (OUTPATIENT)
Age: 70
End: 2025-08-30

## 2025-08-30 LAB
T4 FREE SERPL-MCNC: 1.1 NG/DL
THYROGLOB AB SERPL-ACNC: 16.6 IU/ML
THYROPEROXIDASE AB SERPL IA-ACNC: 12.5 IU/ML
TSH SERPL-ACNC: 2.46 UIU/ML

## 2025-08-31 PROBLEM — R92.30 DENSE BREASTS: Status: ACTIVE | Noted: 2020-08-14

## 2025-08-31 PROBLEM — R79.89 ABNORMAL TSH: Status: RESOLVED | Noted: 2025-06-02 | Resolved: 2025-08-31

## 2025-09-04 ENCOUNTER — APPOINTMENT (OUTPATIENT)
Dept: ENDOCRINOLOGY | Facility: CLINIC | Age: 70
End: 2025-09-04
Payer: MEDICARE

## 2025-09-04 DIAGNOSIS — E11.43 TYPE 2 DIABETES MELLITUS WITH DIABETIC AUTONOMIC (POLY)NEUROPATHY: ICD-10-CM

## 2025-09-04 DIAGNOSIS — E11.9 TYPE 2 DIABETES MELLITUS W/OUT COMPLICATIONS: ICD-10-CM

## 2025-09-04 PROCEDURE — 99214 OFFICE O/P EST MOD 30 MIN: CPT | Mod: 2W

## 2025-09-04 RX ORDER — SEMAGLUTIDE 0.68 MG/ML
2 INJECTION, SOLUTION SUBCUTANEOUS
Qty: 2 | Refills: 0 | Status: ACTIVE | COMMUNITY
Start: 2025-09-04 | End: 1900-01-01

## 2025-09-19 ENCOUNTER — APPOINTMENT (OUTPATIENT)
Dept: GASTROENTEROLOGY | Facility: AMBULATORY MEDICAL SERVICES | Age: 70
End: 2025-09-19
Payer: MEDICARE

## 2025-09-19 ENCOUNTER — NON-APPOINTMENT (OUTPATIENT)
Age: 70
End: 2025-09-19

## 2025-09-19 DIAGNOSIS — K25.9 GASTRIC ULCER, UNSPECIFIED AS ACUTE OR CHRONIC, W/OUT HEMORRHAGE OR PERFORATION: ICD-10-CM

## 2025-09-19 PROCEDURE — 45385 COLONOSCOPY W/LESION REMOVAL: CPT | Mod: PT

## 2025-09-19 PROCEDURE — 45380 COLONOSCOPY AND BIOPSY: CPT | Mod: PT,59

## 2025-09-19 PROCEDURE — 43239 EGD BIOPSY SINGLE/MULTIPLE: CPT | Mod: 59

## 2025-09-19 RX ORDER — PANTOPRAZOLE 40 MG/1
40 TABLET, DELAYED RELEASE ORAL
Qty: 90 | Refills: 3 | Status: ACTIVE | COMMUNITY
Start: 2025-09-19 | End: 1900-01-01